# Patient Record
Sex: MALE | URBAN - METROPOLITAN AREA
[De-identification: names, ages, dates, MRNs, and addresses within clinical notes are randomized per-mention and may not be internally consistent; named-entity substitution may affect disease eponyms.]

---

## 2024-11-25 ENCOUNTER — SOCIAL WORK (OUTPATIENT)
Dept: CASE MANAGEMENT | Facility: HOSPITAL | Age: 44
End: 2024-11-25
Payer: COMMERCIAL

## 2024-11-25 ENCOUNTER — OFFICE VISIT (OUTPATIENT)
Dept: OTOLARYNGOLOGY | Facility: HOSPITAL | Age: 44
End: 2024-11-25
Payer: COMMERCIAL

## 2024-11-25 VITALS — HEIGHT: 66 IN | WEIGHT: 127.7 LBS | TEMPERATURE: 97.7 F | BODY MASS INDEX: 20.52 KG/M2

## 2024-11-25 DIAGNOSIS — C05.1 CANCER OF SOFT PALATE (MULTI): ICD-10-CM

## 2024-11-25 PROCEDURE — 3008F BODY MASS INDEX DOCD: CPT | Performed by: OTOLARYNGOLOGY

## 2024-11-25 PROCEDURE — 31575 DIAGNOSTIC LARYNGOSCOPY: CPT | Performed by: OTOLARYNGOLOGY

## 2024-11-25 PROCEDURE — 99205 OFFICE O/P NEW HI 60 MIN: CPT | Performed by: OTOLARYNGOLOGY

## 2024-11-25 PROCEDURE — 99215 OFFICE O/P EST HI 40 MIN: CPT | Performed by: OTOLARYNGOLOGY

## 2024-11-25 RX ORDER — OXYCODONE AND ACETAMINOPHEN 7.5; 325 MG/1; MG/1
1 TABLET ORAL EVERY 6 HOURS PRN
COMMUNITY

## 2024-11-25 RX ORDER — MULTIVIT-MIN/IRON FUM/FOLIC AC 7.5 MG-4
1 TABLET ORAL DAILY
COMMUNITY

## 2024-11-25 RX ORDER — FERROUS GLUCONATE 324(37.5)
1 TABLET ORAL
COMMUNITY
Start: 2024-11-08

## 2024-11-25 ASSESSMENT — PATIENT HEALTH QUESTIONNAIRE - PHQ9
1. LITTLE INTEREST OR PLEASURE IN DOING THINGS: NOT AT ALL
SUM OF ALL RESPONSES TO PHQ9 QUESTIONS 1 & 2: 0
2. FEELING DOWN, DEPRESSED OR HOPELESS: NOT AT ALL

## 2024-11-25 ASSESSMENT — PAIN SCALES - GENERAL: PAINLEVEL_OUTOF10: 8

## 2024-11-25 NOTE — Clinical Note
November 26, 2024     No Recipients    Patient: Sim Guaman   YOB: 1980   Date of Visit: 11/25/2024       Dear Dr. Ny Recipients:    Thank you for referring Sim Guaman to me for evaluation. Below are my notes for this consultation.  If you have questions, please do not hesitate to call me. I look forward to following your patient along with you.       Sincerely,     Susie Ferguson MD      CC: No Recipients  ______________________________________________________________________________________      Otolaryngology - Head and Neck Cancer Outpatient New Patient Note      Chief Concern:  Soft palate mass    History Of Present Illness  Sim Guaman is a 44 y.o. male with a history of cigarette smoking, presenting today for evaluation of a soft palatal mass.  The patient reports that in August he was experiencing a sore throat, and so went to see his primary care physician.  At that time he was noted to have a white lesion of his right tonsillar area/soft palate, for that he was given antibiotics.  He was then admitted to the hospital in September for the same issue, at the time he reports the physicians believed it was mononucleosis and was treated with supportive therapy and fungals in case it was a topical thrush.  Not biopsied then.    Patient was then seen by a the ENT physician in October, and at the time the patient denied any neck masses.  A biopsy was obtained of the right soft palate, and that resulted back as squamous cell carcinoma, p16 negative.  He had received CT scans of the neck during his hospitalization in September, and these did not show any masses in the neck either.  Throughout this entire time he is experienced a headache, and odynophagia.  However the patient denies any dysphagia, bleeding, voice changes, ear pain, weight changes.  For the past 2 weeks the patient has noted significant changes to his neck, with multiple nodules in the left neck.  Not  "painful and do not drain, however they have increased significantly in size and he is concerned about them.  They are also painful, and he endorses cervical neck pain as well, for this he is taking oxycodone and Tylenol as prescribed to him.    The patient denies any other past medical history, reports he does not go to the doctor very often.  He does endorse a 0.5 pack/day smoking history for over 20 years, he does report that he quit last month.  He also endorses drinking on the weekend, does not quantify how much he is drinking is when prompted.  Denies taking any medications besides oxycodone and lidocaine.  Patient and his spouse live quite far away, close to [facility].  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  There is no problem list on file for this patient.      Surgical History  He has no past surgical history on file.     Social History  He reports that he quit smoking about 2 months ago. His smoking use included cigarettes. He has never used smokeless tobacco. No history on file for alcohol use and drug use.    Family History  No family history on file.     Allergies  Patient has no known allergies.    Review of Systems  A 12-point review of systems was performed and noted be negative except for that which was mentioned in the history of present illness     Last Recorded Vitals  Temperature 36.5 °C (97.7 °F), temperature source Tympanic, height 1.676 m (5' 6\"), weight 57.9 kg (127 lb 11.2 oz).     Physical Exam:  Constitutional:  No acute " distress  Voice:  No hoarseness or other abnormality  Respiration:  Breathing comfortably, no stridor  Cardiovascular:  No clubbing/cyanosis/edema in hands  Eyes:  EOM intact, sclera normal  Neuro:  Alert and oriented times 3, Cranial nerves II-XII grossly intact and symmetric bilaterally  Head and Face:  Symmetric facial features, no masses or lesions, sinuses non-tender to palpation  Salivary Glands:  Parotid and submandibular glands normal bilaterally  Right Ear:  Normal external ear, external auditory canal, and TM to otoscopy, normal hearing to whispered voice.  Left Ear: Normal external ear, external auditory canal, and TM to otoscopy, normal hearing to whispered voice.  Nose:  External nose midline, anterior rhinoscopy is normal with limited visualization to the anterior aspect of the interior turbinates, no bleeding or drainage, no lesions  Oral Cavity/Oropharynx/Lips:  Normal mucous membranes, normal floor of mouth/tongue/.  White, fungating mass noted of the right soft palate/border of oropharynx extending superiorly, to the left, and anteriorly with displacement of the uvula to the left.  The mass is approximately 4 cm in width, it is nonfriable.  Neck/Lymph: Neck level 3 lymphadenopathy, 1 nodule approximately 3 cm in diameter, another nodule approximately 4 cm in diameter.  Right neck levels 2 lymphadenopathy, approximately 2 cm in diameter.  Skin:  Neck skin is without scar or injury  Psych:  Alert and oriented with appropriate mood and affect      Medications:  Medication Documentation Review Audit       Reviewed by Korin Arthur MA (Medical Assistant) on 11/25/24 at 0919      Medication Order Taking? Sig Documenting Provider Last Dose Status   ferrous gluconate 324 (37.5 Fe) MG tablet [id number] Yes Take 1 tablet (324 mg) by mouth early in the morning.. Historical Provider, MD  Active   multivitamin with minerals tablet [id number]  Take 1 tablet by mouth once daily. Historical Provider, MD  Active    oxyCODONE-acetaminophen (Percocet) 7.5-325 mg tablet [id number]  Take 1 tablet by mouth every 6 hours if needed. Historical Provider, MD  Active                      Imaging:  I have personally reviewed the CT scan from his hospitalization in September, it does not show an overt mass in the soft palate, there is a potential for the beginnings of a soft palate mass measuring approximately 3 cm in length.  There is no overt lymphadenopathy noted, potentially a small right 1 m mass in the submandibular region, however this does not have concerning graphic features.  The left lymph nodes are not enlarged in the scan.    Procedure Note: Flexible Nasolaryngoscopy  Verbal informed consent was obtained from the patient/patient's guardian. 4% lidocaine mixed with phenylephrine was prepared and dripped into the nose. It was placed in the right naris. Following an appropriate amount of time to allow for adequate anesthesia, a flexible fiberoptic nasolaryngoscope was placed into the patient's right naris. The nasal cavity, nasopharynx, oropharynx, hypopharynx, and all endolaryngeal structures were visualized and were normal except as listed below. Significant findings included:  -Soft palatal mass noted as described in physical exam findings, it does extend into the right tonsillar region.  There are no masses noted inferiorly, or within the supraglottis or hypopharynx  - No mucosal abnormalities, masses, or lesions appreciated  - Bilateral vocal fold mobile, widely patent airway  - No pooling of secretions       Assessment/Plan  Sim Guaman is a 44-year-old male presenting for evaluation of his soft palatal cancer, I would tentatively stage this as a T3 N2c MX cancer.  The patient is a surgical candidate based on clinical examination, however he will need full workup to ensure that he is a candidate.  We discussed extensively the options including chemo radiation versus surgery.  We explained the role of multi  disciplinary tumor board and how treatment will be determined from that.  The patient we also explained the role of chemotherapy and radiation, and the importance of medical oncology and radiation oncology.    The patient does not have up-to-date scans of the head and neck area, he will need new ones that given the previous scans do not have lymphadenopathy present.  He also need workup for preadmission testing should surgery be the best option for him.  We will proceed with the CT scans of the neck and chest for staging, and discuss him at tumor board.  Will see him back in 2 weeks time for further discussion on how to proceed treatment of his cancer    Plan:  > CT scan of the neck and chest with contrast  > Referral to tumor board for discussion  > Clinic in 2 weeks for discussion, this can be a virtual visit    The patient was seen and examined with Dr. Phyllis Cabrera MD [facility] - PGY1  Otolaryngology - Head and Neck Surgery    ENT Consult pager: [phone]  ENT Peds pager: [phone]  ENT Head & Neck Surgery Phone: [phone]  ENT subspecialty team: Mick individual resident who wrote today's note  ENT Outpatient scheduling number: [phone]  Please Page [phone] or call [phone] if Urgent

## 2024-11-25 NOTE — PROGRESS NOTES
Otolaryngology - Head and Neck Cancer Outpatient New Patient Note      Chief Concern:  Soft palate mass    History Of Present Illness  Sim Guaman is a 44 y.o. male with a history of cigarette smoking, presenting today for evaluation of a soft palatal mass.  The patient reports that in August he was experiencing a sore throat, and so went to see his primary care physician.  At that time he was noted to have a white lesion of his right tonsillar area/soft palate, for that he was given antibiotics.  He was then admitted to the hospital in September for the same issue, at the time he reports the physicians believed it was mononucleosis and was treated with supportive therapy and fungals in case it was a topical thrush.  Not biopsied then.    Patient was then seen by a the ENT physician in October, and at the time the patient denied any neck masses.  A biopsy was obtained of the right soft palate, and that resulted back as squamous cell carcinoma, p16 negative.  He had received CT scans of the neck during his hospitalization in September, and these did not show any masses in the neck either.  Throughout this entire time he is experienced a headache, and odynophagia.  However the patient denies any dysphagia, bleeding, voice changes, ear pain, weight changes.  For the past 2 weeks the patient has noted significant changes to his neck, with multiple nodules in the left neck.  Not painful and do not drain, however they have increased significantly in size and he is concerned about them.  They are also painful, and he endorses cervical neck pain as well, for this he is taking oxycodone and Tylenol as prescribed to him.    The patient denies any other past medical history, reports he does not go to the doctor very often.  He does endorse a 0.5 pack/day smoking history for over 20 years, he does report that he quit last month.  He also endorses drinking on the weekend, does not quantify how much he is drinking  "is when prompted.  Denies taking any medications besides oxycodone and lidocaine.  Patient and his spouse live quite far away, close to [facility].  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  There is no problem list on file for this patient.      Surgical History  He has no past surgical history on file.     Social History  He reports that he quit smoking about 2 months ago. His smoking use included cigarettes. He has never used smokeless tobacco. No history on file for alcohol use and drug use.    Family History  No family history on file.     Allergies  Patient has no known allergies.    Review of Systems  A 12-point review of systems was performed and noted be negative except for that which was mentioned in the history of present illness     Last Recorded Vitals  Temperature 36.5 °C (97.7 °F), temperature source Tympanic, height 1.676 m (5' 6\"), weight 57.9 kg (127 lb 11.2 oz).     Physical Exam:  Constitutional:  No acute distress  Voice:  No hoarseness or other abnormality  Respiration:  Breathing comfortably, no stridor  Cardiovascular:  No clubbing/cyanosis/edema in hands  Eyes:  EOM intact, sclera normal  Neuro:  Alert and oriented times 3, Cranial nerves II-XII grossly intact and symmetric bilaterally  Head and Face:  Symmetric facial features, no masses or lesions, sinuses non-tender to palpation  Salivary Glands:  Parotid and submandibular glands normal bilaterally  Right Ear:  Normal external ear, external auditory canal, and TM to " otoscopy, normal hearing to whispered voice.  Left Ear: Normal external ear, external auditory canal, and TM to otoscopy, normal hearing to whispered voice.  Nose:  External nose midline, anterior rhinoscopy is normal with limited visualization to the anterior aspect of the interior turbinates, no bleeding or drainage, no lesions  Oral Cavity/Oropharynx/Lips:  Normal mucous membranes, normal floor of mouth/tongue/.  White, fungating mass noted of the right soft palate/border of oropharynx extending superiorly, to the left, and anteriorly with displacement of the uvula to the left.  The mass is approximately 4 cm in width, it is nonfriable.  Neck/Lymph: Neck level 3 lymphadenopathy, 1 nodule approximately 3 cm in diameter, another nodule approximately 4 cm in diameter.  Right neck levels 2 lymphadenopathy, approximately 2 cm in diameter.  Skin:  Neck skin is without scar or injury  Psych:  Alert and oriented with appropriate mood and affect      Medications:  Medication Documentation Review Audit       Reviewed by Korin Arthur MA (Medical Assistant) on 11/25/24 at 0919      Medication Order Taking? Sig Documenting Provider Last Dose Status   ferrous gluconate 324 (37.5 Fe) MG tablet [id number] Yes Take 1 tablet (324 mg) by mouth early in the morning.. Historical Provider, MD  Active   multivitamin with minerals tablet [id number]  Take 1 tablet by mouth once daily. Historical Provider, MD  Active   oxyCODONE-acetaminophen (Percocet) 7.5-325 mg tablet [id number]  Take 1 tablet by mouth every 6 hours if needed. Historical Provider, MD  Active                      Imaging:  I have personally reviewed the CT scan from his hospitalization in September, it does not show an overt mass in the soft palate, there is a potential for the beginnings of a soft palate mass measuring approximately 3 cm in length.  There is no overt lymphadenopathy noted, potentially a small right 1 m mass in the submandibular region, however this  does not have concerning graphic features.  The left lymph nodes are not enlarged in the scan.    Procedure Note: Flexible Nasolaryngoscopy  Verbal informed consent was obtained from the patient/patient's guardian. 4% lidocaine mixed with phenylephrine was prepared and dripped into the nose. It was placed in the right naris. Following an appropriate amount of time to allow for adequate anesthesia, a flexible fiberoptic nasolaryngoscope was placed into the patient's right naris. The nasal cavity, nasopharynx, oropharynx, hypopharynx, and all endolaryngeal structures were visualized and were normal except as listed below. Significant findings included:  -Soft palatal mass noted as described in physical exam findings, it does extend into the right tonsillar region.  There are no masses noted inferiorly, or within the supraglottis or hypopharynx  - No mucosal abnormalities, masses, or lesions appreciated  - Bilateral vocal fold mobile, widely patent airway  - No pooling of secretions       Assessment/Plan   Smi Guaman is a 44-year-old male presenting for evaluation of his soft palatal cancer, I would tentatively stage this as a T3 N2c MX cancer.  The patient is a surgical candidate based on clinical examination, however he will need full workup to ensure that he is a candidate.  We discussed extensively the options including chemo radiation versus surgery.  We explained the role of multi disciplinary tumor board and how treatment will be determined from that.  The patient we also explained the role of chemotherapy and radiation, and the importance of medical oncology and radiation oncology.    The patient does not have up-to-date scans of the head and neck area, he will need new ones that given the previous scans do not have lymphadenopathy present.  He also need workup for preadmission testing should surgery be the best option for him.  We will proceed with the CT scans of the neck and chest for staging, and  discuss him at tumor board.  Will see him back in 2 weeks time for further discussion on how to proceed treatment of his cancer    Plan:  > CT scan of the neck and chest with contrast  > Referral to tumor board for discussion  > Clinic in 2 weeks for discussion, this can be a virtual visit    The patient was seen and examined with Dr. Phyllis Cabrera MD [facility]I - PGY1  Otolaryngology - Head and Neck Surgery    ENT Consult pager: [phone]  ENT Peds pager: [phone]  ENT Head & Neck Surgery Phone: [phone]  ENT subspecialty team: Mick individual resident who wrote today's note  ENT Outpatient scheduling number: [phone]  Please Page [phone] or call [phone] if Urgent

## 2024-11-25 NOTE — PROGRESS NOTES
SHASHI made joint visit with MIGUELINA Elena meeting Nima and his wife, Casandra. Nima was visibly upset and unsure of decision to move forward with surgery or not. He expressed concern and fear over having surgery, caring for a feeding tube, and complications that can happen post-op. SW provided support with active listening and validation of feelings/fears. SW encouraged Nima and Casandra to take time to process diagnosis and what comes with it. Additionally medical questions were asked, Dr. Ferguson re-entered room to answer questions about surgical intervention and treatment options. Nima shared that he would like to spend [place] as normal as possible. Medical team attempted to reassure Nima, but he appeared to still be hesitant. Again, encouraged him and Casandra to talk more as a family and take time to process information and make an informed decision that works best for Nima and his family. Provided business card and encouraged them to reach out at anytime. Did not complete psychosocial at this time due to Nima and Casandra feeling overwhelmed. SW to contact [facility] to see if transportation benefit is available due to distance. Shared that SW will check back in after Thanksgiving, Nima and Casandra agreeable. Will continue to follow.

## 2024-11-26 ENCOUNTER — TELEPHONE (OUTPATIENT)
Dept: OTOLARYNGOLOGY | Facility: HOSPITAL | Age: 44
End: 2024-11-26
Payer: COMMERCIAL

## 2024-11-26 NOTE — TELEPHONE ENCOUNTER
Mr. Guaman was seen yesterday by Dr. Ferguson.  He was very overwhelmed by what he heard and was not willing to make any decisions at that time.    We do need updating CT scans done.  He ran out of the office before I could find out from him what location close to him would be most convenient for him.  Since it would be a non- facility, I will have to get the locations information so that I can do precert before the imaging can be scheduled.    I called and Corinne, Sim's wife, answered.  She informed me they would go to [facility] in [place], [place] ([phone]).  I informed her that I would call her back once Precert was obtained.    I called and left a message for [facility] Radiology precert department asking that they please call me back.

## 2024-11-29 ENCOUNTER — SOCIAL WORK (OUTPATIENT)
Dept: CASE MANAGEMENT | Facility: HOSPITAL | Age: 44
End: 2024-11-29
Payer: COMMERCIAL

## 2024-11-29 NOTE — TELEPHONE ENCOUNTER
I tried calling [facility] Outpatient Radiology scheduling today and was informed they are closed today.    I will call back on Monday.

## 2024-11-29 NOTE — PROGRESS NOTES
Social Work Note    Meeting Location: Phone  Person(s) Present: Nima' wife, Casandra, Kasandra KAROL   Identified Needs: Check-in  Impression and Plan: SW called Nima, his wife Casandra answered. Reintroduced self and role. Casandra shared that her and Nima did not talk about surgery any further, and shared that MIGUELINA Elena mentioned trying to network with [facility] to complete scans versus having Nmia come back to [facility] for them. Family is appreciative of arrangement and call. Inquired how Nima and Casandra have been since meeting earlier in the week, she shared that they are both very nervous and scared of the unknown. SW provided validation of feelings and empathy. Shared that SW would connect with MIGUELINA Elena and assist if needed. Encouraged Casandra to call for any questions or concerns in the meantime. Casandra verbalized understanding, will continue to follow.   Interventions Provided: Supportive Counseling  Estimated Time Spent: 5 minutes

## 2024-12-02 NOTE — TELEPHONE ENCOUNTER
I tried calling [facility] scheduling as I still have not heard back from the on my 2 other messages.  I left a message this afternoon, and I've also left a message for Dr. Sy's office asking that his nurse give me a call.  I'm hoping they can help me get Mr. Guaman scheduled for his needed CT neck and chest.

## 2024-12-04 PROBLEM — C05.1: Status: ACTIVE | Noted: 2024-12-04

## 2024-12-04 NOTE — TELEPHONE ENCOUNTER
I was finally able to connect with [facility] radiology ([phone]).  I spoke with Kassy in Scheduling.  She informed me that their first availability for scanning wasn't until the end of February, beginning of March.  I explained that this is a cancer patient and the imaging was ordered STAT.  She asked that I fax the information directly to her once PA is obtained.      She was able to provide the information I needed to do the PA.  This was completed this morning and per the patient's insurance is submitted for medical review.  I faxed the needed supporting documentation over to the insurance company.    [facility] Tax ID [id number]  [facility] NPI: [id number]  Fax Number to Kassy at [facility] [phone]  CPT codes:  56076 (CT neck with), 61398 (CT chest with)    Insurance information:  Phone:  [phone]  Tracking number for radiology precert request: [id number]

## 2024-12-05 NOTE — TUMOR BOARD NOTE
[facility] HEAD AND NECK TUMOR BOARD NOTE:    Sim Guaman Is a 44 y.o. male who was presented by Dr. Ferguson at [facility] Head & Neck Tumor Board on 12/6/24 which included representatives from all Head & Neck disciplines (Medical oncology/Radiation oncology/Otolaryngology/Radiology/Pathology).     History and Physical in Brief:  44 y.o. male who has a new soft palate mass.  He has had a sore throat since August and found that there was a spot on his right tonsil/soft palate by his primary care physician.  This was refractory to antibiotics.  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  He has a 10-pack-year smoking history, quit in October 2024.  Social drinker on weekends, did not quantify volume.  Scope examination with no other aerodigestive lesions.  Clinical examination with 4 cm lesion of the right soft palate, right LAD in levels 2 and 3.    Imaging:  CT Neck with Contrast (9/26/24):   No radiology report reviewed, interpretation from pathologist during tumor board on 12/6/2024:  Difficult to appreciate right soft palate masses, acetabuli for symmetry of the right palate appreciated.  There are multiple lymph nodes at level 2 of the neck though they are not abnormal by size criteria, they have made the suspicious appearance and likely represent metastasis    Chest CT with Contrast:   N/A    PET/CT Head and Neck:   N/A    Procedures to date:  9/2024-biopsy right palate lesion    Pertinent Pathology:  9/2024-well-differentiated squamous of carcinoma     The [facility] Head and Neck Tumor Board considered available treatment options and made the following staging and recommendations:    Staging and Recommendations:    Site: right Oropharyngeal  squamous cell carcinoma  Stage: T2/3N0Mx  Recommendation:  Complete staging with CT chest and repeat CT neck given time interval from initial examination. Will need  to re-evaluate for potential morbidity from defect to determine surgical excision versus chemoradiation.    Clinical Trial Status:   N/A      National site-specific guidelines were discussed with respect to the case.

## 2024-12-06 ENCOUNTER — SOCIAL WORK (OUTPATIENT)
Dept: CASE MANAGEMENT | Facility: HOSPITAL | Age: 44
End: 2024-12-06

## 2024-12-06 ENCOUNTER — APPOINTMENT (OUTPATIENT)
Dept: HEMATOLOGY/ONCOLOGY | Facility: HOSPITAL | Age: 44
End: 2024-12-06
Payer: COMMERCIAL

## 2024-12-06 NOTE — PROGRESS NOTES
"Social Work Note    Meeting Location: Phone  Person(s) Present: Casandra (wife), Kasandra LSW  Identified Needs: STAT CT scan  Impression and Plan: SHASHI received call from Nima's wife, Casandra. She inquired on the status of Nima's CT scans. SHASHI consulted with MIGUELINA Elena who explained \"Insurance clearance still pending. They are also telling me they can't do his scans until end of February/early March. However someone was going to see what they can do. But until PA is completed we are on hold. They may need to have a backup location as we cannot wait that long for scans.\" SW made call to [facility] to find status of the radiology pre-cert. Representative shared that if a MA, RN, or MD can call [phone] they can do a peer to peer quickly to escalate the review given the circumstances. SHASHI provided this information to MIGUELINA Elena as is it out of scope for SHASHI. Reference to conversation is #[id number]. SHASHI made call to Casandra, shared information. She was appreciative of call, but shared that Nima feels that his cancer is spreading. She shared that he has made comments that it is getting harder to swallow and he feels that his throat is closing up. SW shared it is not within scope of practice to recommend medical advice however would encourage Nima and Casandra to consider going to the ED if its continue to progress. Casandra verbalized understanding. SHASHI updated MIGUELINA Elena of conversation.   Interventions Provided: Advocacy, Care Coordination, and Supportive Counseling  Estimated Time Spent: 35 minutes    SW will continue to follow.  "

## 2024-12-09 ENCOUNTER — SOCIAL WORK (OUTPATIENT)
Dept: CASE MANAGEMENT | Facility: HOSPITAL | Age: 44
End: 2024-12-09
Payer: COMMERCIAL

## 2024-12-09 NOTE — PROGRESS NOTES
Social Work Note    Meeting Location: Phone  Person(s) Present: Casandra (Nima' wife), Kasandra ROBERSON  Identified Needs: Transportation  Impression and Plan: SW received Epic chat from MIGUELINA Elena that shared Nima was having transportation issues and was unable to make it to [facility] for his scans. SW called and connected with Casandra, who shared that she had just gotten off the phone with scheduling at radiology with [place] and Nima is getting his scans on 12/13 at 10:15 am. SW inquired how Nima and Casandra are handling situation, she shared Nima is scared and very anxious. SW shared sympathy and validation feelings. Will continue to follow.   Interventions Provided: Advocacy and Supportive Counseling  Estimated Time Spent: 10 minutes      pain

## 2024-12-12 ENCOUNTER — SOCIAL WORK (OUTPATIENT)
Dept: CASE MANAGEMENT | Facility: HOSPITAL | Age: 44
End: 2024-12-12
Payer: COMMERCIAL

## 2024-12-12 NOTE — PROGRESS NOTES
Transportation Referral     Referral Source: pt wife, Casandra   Multiple Rides Needed? No  First Date Transportation is needed? 12/13/2024  Ambulation: Independently  Pick-up Address: [address] [place] [id number]   Patient Phone: [phone]   Accompaniment: Yes, Casandra Ambulation?: Independently  Phone Receives Text Messages? Yes  Transportation Service: [facility] (p: [phone])      Scheduled Ride(s):     Date: 12/13/2024   Appointment: Scans   Drop off [facility] [place] [address] ([facility])   Time: 8:45am-9:15am   Return Ride: Will-call   Confirmation: [id number]      Pt agreeable to plan. SW will continue to follow as needed.

## 2024-12-20 ENCOUNTER — TELEPHONE (OUTPATIENT)
Dept: OTOLARYNGOLOGY | Facility: HOSPITAL | Age: 44
End: 2024-12-20
Payer: COMMERCIAL

## 2024-12-20 DIAGNOSIS — C05.1 CANCER OF SOFT PALATE (MULTI): ICD-10-CM

## 2024-12-20 NOTE — TELEPHONE ENCOUNTER
Dr. Ferguson message me that he spoke with Mr. Guaman yesterday and he's agreeable for surgery.    Triple endoscopy, trach, peg, mandibulotomy, soft palatectomy, pharyngectomy, bilateral neck dissections, radial forearm free flap reconstruction.    I tried to reach Nima and got no answer or answering device.    I will try calling him again on Monday.

## 2024-12-23 NOTE — TELEPHONE ENCOUNTER
I tried calling  And Mrs. Guaman again.  I was leaving a message when Mrs. Guaman picked up.    I informed her that I was aware Dr. Ferguson spoke with them and that I was told Nima was agreeable to undergo the surgery.    She confirmed this.  I informed her that surgery would be on 1/20/25, and that they would need to arrive at 5:45 a.m. to the hospital that day.  I also informed her that Nima would need to go through preadmission testing here in [place], along with meeting Dr. Ferguson's partner, Dr. Pimentel, who would also be involved in the surgery.    She mention that she was hoping the insurance company would be able to provide transportation due to them having car issues.  I informed her that I would work with Kasandra, our  to see what can be done.    She does understand that the pretesting apts are a must in order for surgery to be done.    With them in agreement for the surgery, I will start working on the needed apts and will follow up with them in the coming days.    DOS:  1/20/2025

## 2024-12-24 ENCOUNTER — TELEPHONE (OUTPATIENT)
Facility: CLINIC | Age: 44
End: 2024-12-24
Payer: COMMERCIAL

## 2024-12-24 ENCOUNTER — SOCIAL WORK (OUTPATIENT)
Dept: CASE MANAGEMENT | Facility: HOSPITAL | Age: 44
End: 2024-12-24
Payer: COMMERCIAL

## 2024-12-24 ENCOUNTER — HOSPITAL ENCOUNTER (OUTPATIENT)
Facility: HOSPITAL | Age: 44
Setting detail: SURGERY ADMIT
End: 2024-12-24
Attending: OTOLARYNGOLOGY | Admitting: OTOLARYNGOLOGY
Payer: COMMERCIAL

## 2024-12-24 PROBLEM — C05.1: Status: ACTIVE | Noted: 2024-12-20

## 2024-12-24 NOTE — PROGRESS NOTES
"Social Work Note    Meeting Location: Phone  Person(s) Present: Corinne Herring (wife), Kasandra LSW   Identified Needs: Medical Questions  Impression and Plan: SW received call from Nima' wife, Corinne who shared that Nima had questions. She placed Nima on the phone, SW asked how he was doing, he shared that he is doing horrible. When asked to expand on that, he said that he didn't want to get into it, but that he had questions about the scans and insurance. He asked if the scans were delayed because of insurance. SW shared unsure of details as SW did not work with insurance on the peer to peer, only called to gain status of review. Explained that RN Kassy may be able to offer insight. He shared that he will call her next. Before ending call, SHASHI explained working on resources for transportation to come up to [facility] for PAT and surgery. Niam responded with \"it may be too late for that, I don't know\". SW offered support and encouraged him to take things one at a time. Shared that SW will call once all resources are gathered so that discussion of what option can be decided upon. Will continue to follow.   Interventions Provided: Assessment, Care Coordination, and Supportive Counseling      "

## 2024-12-24 NOTE — TELEPHONE ENCOUNTER
"Nima called me for reassurance to see if there was anything that could have been done sooner to get his scans done.  He admits to being very anxious about his situation.    I explained that based on his request to do the scans locally, the need for me to get in touch with [facility] to get the needed information, and get PA done, the scans were done as soon as they could be.    I stated his insurance has a 2 week PA period from the time of apt scheduling.  I had to get the information from [place] and submit the request myself, which took several days before [place] even called me back so I could start it.  In addition [place] informed me their soonest was end of February, early March 2025 for the scan, which I explained was not acceptable due to his cancer situation, and he declined the first date they offered, he got done in a time frame as best as we could do.    I stated the only thing that might have helped moved things along quicker would have been for him to make his decision as to what he wanted to do while he was in the office with us, and to be willing to come to [place] for the testing.  Unfortunately, next surgery date is 1/20/25.    He also wanted to make sure that our team knows that he needs \"a lot of pain medication.\"  \"I've been on it a while and I don't want to suffer.\"  I informed him that we will involve our pain management/palliative care team as part of his postop care team.    Nima is worried that if he talks or moves his head the cancer will spread.  I informed him no.  I encouraged him to spend time with his family like he wanted and enjoy the holidays.    He will call me back with any further questions/concerns.  "

## 2024-12-26 ENCOUNTER — SOCIAL WORK (OUTPATIENT)
Dept: CASE MANAGEMENT | Facility: HOSPITAL | Age: 44
End: 2024-12-26
Payer: COMMERCIAL

## 2024-12-26 NOTE — PROGRESS NOTES
Social Work Note    Meeting Location: Phone  Person(s) Present: Nima' wife Kasandra Guajardo LSW  Identified Needs: Transportation  Impression and Plan: SW made call this morning to Nima, wife Casandra answered the phone. SW explained continuous effort to gather transportation resources to present to Nima and Casandra to determine best option. Casandra shared PAT testing will be on 1/10 and surgery is for 1/20. She explained that Nima prefers to come up on 1/10 and go back home on 1/10. She also shared that due to her children, she would have to come up and leave the same day of Nima' surgery but is unsure of how she would get back home. Her plan is to come back up once he is recovered and ready for discharge. SW offered empathy and understanding of extreme coordination this will take as Nisa have school-aged children. SW explained need to consult with supervisor to gain clarity and seek additional resources.     SW collaborated with supervisor, determination came to transport Nima half-way to [facility] if insurance, [facility] will not accommodate. SW made call to [facility], spoke to different representative that shared they will accommodate transportation however, much notice is needed because they must confirm details of visit with medical team. SHASHI received staff message from PAT representative Diana Bliss that provided times available, SW messaged and requested 10:30 slot, successfully scheduled. Transportation is pending ENT appointment for same day as the transportation  time may vary pending when Dr. Pimentel can fit Nima into the schedule. Will collaborate with MIGUELINA Elena to finalize details.     Interventions Provided: Advocacy, Assessment, and Care Coordination  Estimated Time Spent: 120+ minutes

## 2025-01-06 ENCOUNTER — SOCIAL WORK (OUTPATIENT)
Dept: CASE MANAGEMENT | Facility: HOSPITAL | Age: 45
End: 2025-01-06
Payer: COMMERCIAL

## 2025-01-06 NOTE — PROGRESS NOTES
"Transportation    Ambulation: Independently  Pick-up Address: [address] [place] [address]  Patient Phone: [phone]  Accompaniment: Yes,   Ambulation?: Independently  Phone Receives Text Messages? Yes  Transportation Service: [employer] (p\" [phone])      Scheduled Ride(s):     Date:  1/10/2025  Appointment:  PAT & ENT   Drop off Address: [facility] SCC: [address] [place] [address]   Time:  7:35-8:05  Return Ride:  Will call  Confirmation: [id number]    SHASHI called Nima and his wife Casandra to inform them of details. Casandra answered, shared detailed information of upcoming trip, she acknowledged. SW shared plan to meet in-person to go over details of 1/20 surgery, Casandra is agreeable to plan. She explained that Nima is open to staying at [facility] day before surgery, but had concerns about bed bugs. SHASHI shared that [facility] has a strict adherence to cleanliness but will call [facility] and inquire if it has been an issue so that Nima can make informed decision if he would like to stay or not. Will follow up at 1/10 appointment. Will continue to follow.   "

## 2025-01-09 NOTE — PROGRESS NOTES
History of Present Illness    Sim Guaman is a 44 y.o. male who is a patient of one of my partners.  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  He had some scanning done that I personally reviewed that shows the lesion in the pharynx as well as the bilateral neck disease.  He is scheduled to have this addressed surgically in the near future.  His main concern has to do with pain.  He really does not want to suffer.    Physical Exam    Examination of the oral cavity and oropharynx shows this large lesion involving the entire soft palate and extending on the lateral pharyngeal wall on the right.  He has good mobility of the tongue.  He has good mandibular excursion.  Palpation of the parotid, neck, thyroid feel shows the palpable level 2 adenopathies bilaterally.    Assessment and Plan    Large oropharyngeal cancer which is p16 negative.  I went over the extent of surgery with the patient and his spouse.  I answered all of her questions.  He will definitely need to be seen by pain management when we have him in the hospital.

## 2025-01-10 ENCOUNTER — SOCIAL WORK (OUTPATIENT)
Dept: CASE MANAGEMENT | Facility: HOSPITAL | Age: 45
End: 2025-01-10

## 2025-01-10 ENCOUNTER — PRE-ADMISSION TESTING (OUTPATIENT)
Dept: PREADMISSION TESTING | Facility: HOSPITAL | Age: 45
End: 2025-01-10
Payer: COMMERCIAL

## 2025-01-10 ENCOUNTER — OFFICE VISIT (OUTPATIENT)
Dept: OTOLARYNGOLOGY | Facility: HOSPITAL | Age: 45
End: 2025-01-10
Payer: COMMERCIAL

## 2025-01-10 VITALS
BODY MASS INDEX: 19.78 KG/M2 | SYSTOLIC BLOOD PRESSURE: 119 MMHG | WEIGHT: 126 LBS | RESPIRATION RATE: 16 BRPM | HEART RATE: 76 BPM | HEIGHT: 67 IN | DIASTOLIC BLOOD PRESSURE: 80 MMHG | TEMPERATURE: 99.3 F

## 2025-01-10 DIAGNOSIS — C10.9 MALIGNANT NEOPLASM OF OROPHARYNX: Primary | ICD-10-CM

## 2025-01-10 DIAGNOSIS — C05.1 CANCER OF SOFT PALATE (MULTI): ICD-10-CM

## 2025-01-10 LAB
ABO GROUP (TYPE) IN BLOOD: NORMAL
ALBUMIN SERPL BCP-MCNC: 3.7 G/DL (ref 3.4–5)
ALP SERPL-CCNC: 72 U/L (ref 33–120)
ALT SERPL W P-5'-P-CCNC: 14 U/L (ref 10–52)
ANION GAP SERPL CALC-SCNC: 14 MMOL/L (ref 10–20)
ANTIBODY SCREEN: NORMAL
AST SERPL W P-5'-P-CCNC: 28 U/L (ref 9–39)
BILIRUB SERPL-MCNC: 0.2 MG/DL (ref 0–1.2)
BUN SERPL-MCNC: 6 MG/DL (ref 6–23)
CALCIUM SERPL-MCNC: 8.9 MG/DL (ref 8.6–10.6)
CHLORIDE SERPL-SCNC: 100 MMOL/L (ref 98–107)
CO2 SERPL-SCNC: 28 MMOL/L (ref 21–32)
CREAT SERPL-MCNC: 0.78 MG/DL (ref 0.5–1.3)
EGFRCR SERPLBLD CKD-EPI 2021: >90 ML/MIN/1.73M*2
ERYTHROCYTE [DISTWIDTH] IN BLOOD BY AUTOMATED COUNT: 22.7 % (ref 11.5–14.5)
GLUCOSE SERPL-MCNC: 83 MG/DL (ref 74–99)
HCT VFR BLD AUTO: 35.4 % (ref 41–52)
HGB BLD-MCNC: 11.4 G/DL (ref 13.5–17.5)
MCH RBC QN AUTO: 27.7 PG (ref 26–34)
MCHC RBC AUTO-ENTMCNC: 32.2 G/DL (ref 32–36)
MCV RBC AUTO: 86 FL (ref 80–100)
NRBC BLD-RTO: 0 /100 WBCS (ref 0–0)
PLATELET # BLD AUTO: 439 X10*3/UL (ref 150–450)
POTASSIUM SERPL-SCNC: 4.4 MMOL/L (ref 3.5–5.3)
PREALB SERPL-MCNC: 13.7 MG/DL (ref 18–40)
PROT SERPL-MCNC: 7.2 G/DL (ref 6.4–8.2)
RBC # BLD AUTO: 4.12 X10*6/UL (ref 4.5–5.9)
RH FACTOR (ANTIGEN D): NORMAL
SODIUM SERPL-SCNC: 138 MMOL/L (ref 136–145)
TSH SERPL-ACNC: 2.12 MIU/L (ref 0.44–3.98)
WBC # BLD AUTO: 9.5 X10*3/UL (ref 4.4–11.3)

## 2025-01-10 PROCEDURE — 36415 COLL VENOUS BLD VENIPUNCTURE: CPT

## 2025-01-10 PROCEDURE — 84134 ASSAY OF PREALBUMIN: CPT

## 2025-01-10 PROCEDURE — 86901 BLOOD TYPING SEROLOGIC RH(D): CPT

## 2025-01-10 PROCEDURE — 99244 OFF/OP CNSLTJ NEW/EST MOD 40: CPT | Performed by: NURSE PRACTITIONER

## 2025-01-10 PROCEDURE — 84443 ASSAY THYROID STIM HORMONE: CPT

## 2025-01-10 PROCEDURE — 85027 COMPLETE CBC AUTOMATED: CPT

## 2025-01-10 PROCEDURE — 1036F TOBACCO NON-USER: CPT | Performed by: OTOLARYNGOLOGY

## 2025-01-10 PROCEDURE — 84075 ASSAY ALKALINE PHOSPHATASE: CPT

## 2025-01-10 PROCEDURE — 99214 OFFICE O/P EST MOD 30 MIN: CPT | Performed by: OTOLARYNGOLOGY

## 2025-01-10 RX ORDER — HYDROMORPHONE HYDROCHLORIDE 2 MG/1
TABLET ORAL
COMMUNITY
Start: 2025-01-02

## 2025-01-10 RX ORDER — CHLORHEXIDINE GLUCONATE ORAL RINSE 1.2 MG/ML
15 SOLUTION DENTAL AS NEEDED
Qty: 473 ML | Refills: 0 | Status: SHIPPED | OUTPATIENT
Start: 2025-01-10 | End: 2025-01-12

## 2025-01-10 RX ORDER — CHLORHEXIDINE GLUCONATE 40 MG/ML
SOLUTION TOPICAL DAILY PRN
Qty: 473 ML | Refills: 0 | Status: SHIPPED | OUTPATIENT
Start: 2025-01-10 | End: 2025-01-15

## 2025-01-10 RX ORDER — LIDOCAINE HYDROCHLORIDE 20 MG/ML
SOLUTION OROPHARYNGEAL
COMMUNITY
Start: 2025-01-09

## 2025-01-10 RX ORDER — CLONAZEPAM 1 MG/1
1 TABLET ORAL 2 TIMES DAILY
COMMUNITY

## 2025-01-10 RX ORDER — OXYCODONE HYDROCHLORIDE 15 MG/1
TABLET ORAL
COMMUNITY
Start: 2025-01-06

## 2025-01-10 RX ORDER — BUSPIRONE HYDROCHLORIDE 5 MG/1
TABLET ORAL
COMMUNITY
Start: 2024-12-23

## 2025-01-10 ASSESSMENT — DUKE ACTIVITY SCORE INDEX (DASI)
CAN YOU TAKE CARE OF YOURSELF (EAT, DRESS, BATHE, OR USE TOILET): YES
CAN YOU CLIMB A FLIGHT OF STAIRS OR WALK UP A HILL: YES
CAN YOU DO LIGHT WORK AROUND THE HOUSE LIKE DUSTING OR WASHING DISHES: YES
DASI METS SCORE: 8.4
CAN YOU DO MODERATE WORK AROUND THE HOUSE LIKE VACUUMING, SWEEPING FLOORS OR CARRYING GROCERIES: YES
CAN YOU PARTICIPATE IN STRENOUS SPORTS LIKE SWIMMING, SINGLES TENNIS, FOOTBALL, BASKETBALL, OR SKIING: YES
CAN YOU WALK A BLOCK OR TWO ON LEVEL GROUND: YES
CAN YOU PARTICIPATE IN MODERATE RECREATIONAL ACTIVITIES LIKE GOLF, BOWLING, DANCING, DOUBLES TENNIS OR THROWING A BASEBALL OR FOOTBALL: YES
TOTAL_SCORE: 45.7
CAN YOU DO YARD WORK LIKE RAKING LEAVES, WEEDING OR PUSHING A MOWER: NO
CAN YOU DO HEAVY WORK AROUND THE HOUSE LIKE SCRUBBING FLOORS OR LIFTING AND MOVING HEAVY FURNITURE: NO
CAN YOU RUN A SHORT DISTANCE: YES
CAN YOU WALK INDOORS, SUCH AS AROUND YOUR HOUSE: YES
CAN YOU HAVE SEXUAL RELATIONS: YES

## 2025-01-10 ASSESSMENT — ENCOUNTER SYMPTOMS
RESPIRATORY NEGATIVE: 1
GASTROINTESTINAL NEGATIVE: 1
NECK PAIN: 1
EYES NEGATIVE: 1
SINUS CONGESTION: 1
NECK STIFFNESS: 1
TROUBLE SWALLOWING: 1
ARTHRALGIAS: 1
CARDIOVASCULAR NEGATIVE: 1
ENDOCRINE NEGATIVE: 1
NECK SWELLING: 1
CONSTITUTIONAL NEGATIVE: 1

## 2025-01-10 ASSESSMENT — PATIENT HEALTH QUESTIONNAIRE - PHQ9
2. FEELING DOWN, DEPRESSED OR HOPELESS: NOT AT ALL
1. LITTLE INTEREST OR PLEASURE IN DOING THINGS: NOT AT ALL
SUM OF ALL RESPONSES TO PHQ9 QUESTIONS 1 AND 2: 0

## 2025-01-10 ASSESSMENT — LIFESTYLE VARIABLES: SMOKING_STATUS: NONSMOKER

## 2025-01-10 NOTE — PROGRESS NOTES
"Social Work Note    Meeting Location: In-Person  Person(s) Present: Casandra Herring (wife), Kasandra LSW   Identified Needs: Transportation, [facility] referral, Spiritual care   Impression and Plan:   SHASHI met with Nima and his wife Casandra after PAT and ENT appointment today. Immediately upon entering the room Nima shared that he \"has ten more days to talk about transportation let's get out of here\". His wife stopped him and expressed that he needed to sit down and they needed to talk with SW on lodging and transportation now, in-person. He seated himself and appeared agitated by request. He then expressed \"I will not stay at any place with bed bugs or roaches, if I see even one I'll start walking home and die\". SHASHI shared that [facility] does not have any current or past issues with bed bugs or infestations and understands his seriousness. Shared that it is reasonable to not want to stay anywhere with such issues.     SW brought up that his insurance cannot transport Casandra back down to [place] after his surgery without him there, but can bring her up. He became angry and said that \"so now I cannot even have my wife there by my side during this surgery. How am I going to even get to the hospital. I can't even see, I will be wandering all over here not knowing where to go. This isn't going to work\" SHASHI attempted to de-escalate conversation and shared that there is a shuttle that can provide transportation to outpatient surgery area and SW is more than happy to call the shuttle for him, or the  at [facility] can as well. SHASHI encouraged Casandra to talk with family/friends and see if someone can bring her back home after surgery. Nima interrupted and said to Casandra \"you have family, I have no one. You need to call your brother and have him get you, I don't have anyone that can help, you do.\" This conversation appeared to make Casandra cry, she was tearful for remainder of the appointment.     Nima then appeared to be very " anxious and overwhelmed with thoughts of surgery going wrong. SHASHI attempted to re frame conversation and outlook but also provide validation of concerns and perspective. He explained that he is anticipating surgery to have complications and go wrong, SHASHI shared that he has a supportive and talented medical team and that should anything go wrong [facility] has the supports to intervene. He acknowledged this, but maintained that something will go wrong. SHASHI provided empathetic and active listening throughout conversation. Nima then said that he believes in [facility] but is unsure of what he thinks now. SHASHI shared that such a diagnosis can certainly test tracy and offered spiritual care for additional support to work through feelings. Nima agreed and asked that they reach out before surgery and visit during recovery. SHASHI consulted with 's Latoya and Jakob. SHASHI offered [facility] referral, both are agreeable. SHASHI to call [facility] on 1/13 to review details before sending over referral. Will continue to follow.   Interventions Provided: Assessment, Care Coordination, De-Escalation, and Supportive Counseling  Estimated Time Spent: 25 minutes

## 2025-01-10 NOTE — PREPROCEDURE INSTRUCTIONS
Fasting Guidelines    NPO Instructions:    Do not eat any food after midnight the night before your surgery/procedure.  You may have up to TEN ounces of clear liquids until TWO hours before your instructed arrival time to the hospital. This includes water, black tea/coffee, (no milk or cream), apple juice, and/or electrolyte drinks (Gatorade).  You may chew gum up to TWO hours before your surgery/procedure.    Additional Instructions:     We have sent a prescription for Hibiclens soap and Peridex mouth wash to your preferred pharmacy.  If you have not already, Please  your prescription and start using as directed before surgery.  Follow the instruction sheet provided to you at your CPM/PAT appointment.    Avoid herbal supplements, multivitamins and NSAIDS (non-steroidal anti-inflammatory drugs) such as Advil, Aleve, Ibuprofen, Naproxen, Excedrin, Meloxicam or Celebrex for at least 7 days prior to surgery. May take Tylenol as needed.    Avoid tobacco and alcohol products for 24 hours prior to surgery.    CONTACT SURGEON'S OFFICE IF YOU DEVELOP:  * Fever = 100.4 F   * New respiratory symptoms (e.g. cough, shortness of breath, respiratory distress, sore throat)  * Recent loss of taste or smell  *Flu like symptoms such as headache, fatigue or gastrointestinal symptoms  * You develop any open sores, shingles, burning or painful urination   AND/OR:  * You no longer wish to have the surgery.  * Any other personal circumstances change that may lead to the need to cancel or defer this surgery.  *You were admitted to any hospital within one week of your planned procedure.    Seven/Six Days before Surgery:  Review your medication instructions, stop indicated medications    Day of Surgery:  Review your medication instructions, take indicated medications  Wear comfortable loose fitting clothing  Do not use moisturizers, creams, lotions or perfume  All jewelry and valuables should be left at home      [facility]  [phone]           Patient Information: Pre-Operative Infection Prevention Measures     Why did I have my nose, under my arms, and groin swabbed?  The purpose of the swab is to identify Staphylococcus aureus inside your nose or on your skin.  The swab was sent to the laboratory for culture.  A positive swab/culture for Staphylococcus aureus is called colonization or carriage.      What is Staphylococcus aureus?  Staphylococcus aureus, also known as “staph”, is a germ found on the skin or in the nose of healthy people.  Sometimes Staphylococcus aureus can get into the body and cause an infection.  This can be minor (such as pimples, boils, or other skin problems).  It might also be serious (such as a blood infection, pneumonia, or a surgical site infection).    What is Staphylococcus aureus colonization or carriage?  Colonization or carriage means that a person has the germ but is not sick from it.  These bacteria can be spread on the hands or when breathing or sneezing.    How is Staphylococcus aureus spread?  It is most often spread by close contact with a person or item that carries it.    What happens if my culture is positive for Staphylococcus aureus?  Your doctor/medical team will use this information to guide any antibiotic treatment which may be necessary.  Regardless of the culture results, we will clean the inside of your nose with a betadine swab just before you have your surgery.      Will I get an infection if I have Staphylococcus aureus in my nose or on my skin?  Anyone can get an infection with Staphylococcus aureus.  However, the best way to reduce your risk of infection is to follow the instructions provided to you for the use of your CHG soap and dental rinse.        Patient Information: Oral/Dental Rinse    What is oral/dental rinse?   It is a mouthwash. It is a way of cleaning the mouth with a germ-killing solution before your surgery.  The solution contains  chlorhexidine, commonly known as CHG.   It is used inside the mouth to kill a bacteria known as Staphylococcus aureus.  Let your doctor know if you are allergic to Chlorhexidine.    Why do I need to use CHG oral/dental rinse?  The CHG oral/dental rinse helps to kill a bacteria in your mouth known as Staphylococcus aureus.     This reduces the risk of infection at the surgical site.      Using your CHG oral/dental rinse  STEPS:  Use your CHG oral/dental rinse after you brush your teeth the night before (at bedtime) and the morning of your surgery.  Follow all directions on your prescription label.    Use the cap on the container to measure 15ml   Swish (gargle if you can) the mouthwash in your mouth for at least 30 seconds, (do not swallow) and spit out  After you use your CHG rinse, do not rinse your mouth with water, drink or eat.  Please refer to the prescription label for the appropriate time to resume oral intake      What side effects might I have using the CHG oral/dental rinse?  CHG rinse will stick to plaque on the teeth.  Brush and floss just before use.  Teeth brushing will help avoid staining of plaque during use.      Patient Information: Home Preoperative Antibacterial Shower      What is a home preoperative antibacterial shower?  This shower is a way of cleaning the skin with a germ-killing solution before surgery.  The solution contains chlorhexidine, commonly known as CHG.  CHG is a skin cleanser with germ-killing ability.  Let your doctor know if you are allergic to chlorhexidine.    Why do I need to take a preoperative antibacterial shower?  Skin is not sterile.  It is best to try to make your skin as free of germs as possible before surgery.  Proper cleansing with a germ-killing soap before surgery can lower the number of germs on your skin.  This helps to reduce the risk of infection at the surgical site.  Following the instructions listed below will help you prepare your skin for surgery.       How do I use the solution?  Steps:  Begin using your CHG soap 5 days before your scheduled surgery on ________________________.    First, wash and rinse your hair using the CHG soap. Keep CHG soap away from ear canals and eyes.  Rinse completely, do not condition.  Hair extensions should be removed.  Wash your face with your normal soap and rinse.    Apply the CHG solution to a clean wet washcloth.  Turn the water off or move away from the water spray to avoid premature rinsing of the CHG soap as you are applying.   Firmly lather your entire body from the neck down.  Do not use on your face.  Pay special attention to the area(s) where your incision(s) will be located unless they are on your face.  Avoid scrubbing your skin too hard.  The important point is to have the CHG soap sit on your skin for 3 minutes.    When the 3 minutes are up, turn on the water and rinse the CHG solution off your body completely.   DO NOT wash with regular soap after you have used the CHG soap solution  Pat yourself dry with a clean, freshly-laundered towel.  DO NOT apply powders, deodorants, or lotions.  Dress in clean, freshly laundered nightclothes.    Be sure to sleep with clean, freshly laundered sheets.  Be aware that CHG will cause stains on fabrics; if you wash them with bleach after use.  Rinse your washcloth and other linens that have contact with CHG completely.  Use only non-chlorine detergents to launder the items used.   The morning of surgery is the fifth day.  Repeat the above steps and dress in clean comfortable clothing     Whom should I contact if I have any questions regarding the use of CHG soap?  Call the [facility], [facility] at [phone] if you have any questions.               Preoperative Brain Exercises    What are brain exercises?  A brain exercise is any activity that engages your thinking (cognitive) skills.    What types of activities are  considered brain exercises?  Jigsaw puzzles, crossword puzzles, word jumble, memory games, word search, and many more.  Many can be found free online or on your phone via a mobile kerry.    Why should I do brain exercises before my surgery?  More recent research has shown brain exercise before surgery can lower the risk of postoperative delirium (confusion) which can be especially important for older adults.  Patients who did brain exercises for 5 to 10 hours the days before surgery, cut their risk of postoperative delirium in half up to 1 week after surgery.         [facility]    Preoperative Deep Breathing Exercises    Why it is important to do deep breathing exercises before my surgery?  Deep breathing exercises strengthen your breathing muscles.  This helps you to recover after your surgery and decreases the chance of breathing complications.      How are the deep breathing exercises done?  Sit straight with your back supported.  Breathe in deeply and slowly through your nose. Your lower rib cage should expand and your abdomen may move forward.  Hold that breath for 3 to 5 seconds.  Breathe out through pursed lips, slowly and completely.  Rest and repeat 10 times every hour while awake.  Rest longer if you become dizzy or lightheaded.         Patient and Family Education             Ways You Can Help Prevent Blood Clots             This handout explains some simple things you can do to help prevent blood clots.      Blood clots are blockages that can form in the body's veins. When a blood clot forms in your deep veins, it may be called a deep vein thrombosis, or DVT for short. Blood clots can happen in any part of the body where blood flows, but they are most common in the arms and legs. If a piece of a blood clot breaks free and travels to the lungs, it is called a pulmonary embolus (PE). A PE can be a very serious problem.         Being in the hospital or having surgery can raise your  chances of getting a blood clot because you may not be well enough to move around as much as you normally do.         Ways you can help prevent blood clots in the hospital         Wearing SCDs. SCDs stands for Sequential Compression Devices.   SCDs are special sleeves that wrap around your legs  They attach to a pump that fills them with air to gently squeeze your legs every few minutes.   This helps return the blood in your legs to your heart.   SCDs should only be taken off when walking or bathing.   SCDs may not be comfortable, but they can help save your life.               Wearing compression stockings - if your doctor orders them. These special snug fitting stockings gently squeeze your legs to help blood flow.       Walking. Walking helps move the blood in your legs.   If your doctor says it is ok, try walking the halls at least   5 times a day. Ask us to help you get up, so you don't fall.      Taking any blood thinning medicines your doctor orders.        Page 1 of 2     [facility]; 3/23   Ways you can help prevent blood clots at home       Wearing compression stockings - if your doctor orders them. ? Walking - to help move the blood in your legs.       Taking any blood thinning medicines your doctor orders.      Signs of a blood clot or PE      Tell your doctor or nurse know right away if you have of the problems listed below.    If you are at home, seek medical care right away. Call 911 for chest pain or problems breathing.               Signs of a blood clot (DVT) - such as pain,  swelling, redness or warmth in your arm or leg      Signs of a pulmonary embolism (PE) - such as chest     pain or feeling short of breath

## 2025-01-10 NOTE — CPM/PAT H&P
CPM/PAT Evaluation       Name: Sim Guaman (Sim Guaman)  /Age: 1980/44 y.o.     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 Ferguson and Dr. Pimentel on 25.  Past Medical History:   Diagnosis Date    Anxiety     Arthritis     Cancer of soft palate (Multi)     Cataract     Dysphagia        Past Surgical History:   Procedure Laterality Date    CATARACT EXTRACTION         Patient Sexual activity questions deferred to the physician.    Family History   Problem Relation Name Age of Onset    Cancer Mother         No Known Allergies    Prior to Admission medications    Medication Sig Start Date End Date Taking? Authorizing Provider   clonazePAM (KlonoPIN) 1 mg tablet Take 1 tablet (1 mg) by mouth 2 times a day.   Yes Historical Provider, MD   multivitamin with minerals tablet Take 1 tablet by mouth once daily.   Yes Historical Provider, MD   oxyCODONE-acetaminophen (Percocet) 7.5-325 mg tablet Take 1 tablet by mouth every 6 hours if needed.   Yes Historical Provider, MD   ferrous gluconate 324 (37.5 Fe) MG tablet Take 1 tablet (324 mg) by mouth early in the morning..  Patient not taking: Reported on 1/10/2025 11/8/24 1/10/25  Historical Provider, MD MCDERMOTT ROS:   Constitutional:   neg    Neuro/Psych:    Headache  Eyes:   neg    Ears:   Nose:    sinus congestion  Mouth:    mouth pain   mouth lesions  Throat:    throat pain   dysphagia  Neck:    neck pain   neck swelling   neck stiffness  Cardio:   neg    Respiratory:   neg    Endocrine:   neg    GI:   neg    :   neg    Musculoskeletal:    arthralgias  Hematologic:   neg    Skin:  neg        Physical Exam  Vitals reviewed.   Constitutional:       Appearance: Normal  "appearance.   HENT:      Head: Normocephalic and atraumatic.      Nose: Congestion present.      Mouth/Throat:      Comments: Mass at back of throat  Cardiovascular:      Rate and Rhythm: Normal rate and regular rhythm.      Pulses: Normal pulses.      Heart sounds: Normal heart sounds.   Pulmonary:      Effort: Pulmonary effort is normal.      Breath sounds: Normal breath sounds.   Abdominal:      Palpations: Abdomen is soft.   Musculoskeletal:         General: Normal range of motion.      Cervical back: Normal range of motion.   Skin:     General: Skin is warm.   Neurological:      General: No focal deficit present.      Mental Status: He is alert and oriented to person, place, and time.   Psychiatric:         Mood and Affect: Mood normal.         Behavior: Behavior normal.          PAT AIRWAY:   Airway:     Mallampati::  II    TM distance::  >3 FB    Neck ROM::  Full      Testing/Diagnostic:     Patient Specialist/PCP:     Visit Vitals  /80   Pulse 76   Temp 37.4 °C (99.3 °F)   Resp 16   Ht 1.702 m (5' 7\")   Wt 57.2 kg (126 lb)   BMI 19.73 kg/m²   Smoking Status Former   BSA 1.64 m²       DASI Risk Score      Flowsheet Row Pre-Admission Testing from 1/10/2025 in [facility]   Can you take care of yourself (eat, dress, bathe, or use toilet)?  2.75 filed at 01/10/2025 1049   Can you walk indoors, such as around your house? 1.75 filed at 01/10/2025 1049   Can you walk a block or two on level ground?  2.75 filed at 01/10/2025 1049   Can you climb a flight of stairs or walk up a hill? 5.5 filed at 01/10/2025 1049   Can you run a short distance? 8 filed at 01/10/2025 1049   Can you do light work around the house like dusting or washing dishes? 2.7 filed at 01/10/2025 1049   Can you do moderate work around the house like vacuuming, sweeping floors or carrying groceries? 3.5 filed at 01/10/2025 1049   Can you do heavy work around the house like scrubbing floors or lifting and moving heavy furniture? "  0 filed at 01/10/2025 1049   Can you do yard work like raking leaves, weeding or pushing a mower? 0 filed at 01/10/2025 1049   Can you have sexual relations? 5.25 filed at 01/10/2025 1049   Can you participate in moderate recreational activities like golf, bowling, dancing, doubles tennis or throwing a baseball or football? 6 filed at 01/10/2025 1049   Can you participate in strenous sports like swimming, singles tennis, football, basketball, or skiing? 7.5 filed at 01/10/2025 1049   DASI SCORE 45.7 filed at 01/10/2025 1049   METS Score (Will be calculated only when all the questions are answered) 8.4 filed at 01/10/2025 1049          Caprini DVT Assessment      Flowsheet Row Pre-Admission Testing from 1/10/2025 in [facility]   DVT Score (IF A SCORE IS NOT CALCULATING, MUST SELECT A BMI TO COMPLETE) 9 filed at 01/10/2025 1108   Medical Factors Present cancer, chemotherapy, or previous malignancy filed at 01/10/2025 1108   Surgical Factors Major surgery planned, lasting over 3 hours filed at 01/10/2025 1108   BMI (BMI MUST BE CHOSEN) 30 or less filed at 01/10/2025 1108          Modified Frailty Index      Flowsheet Row Pre-Admission Testing from 1/10/2025 in [facility]   Non-independent functional status (problems with dressing, bathing, personal grooming, or cooking) 0 filed at 01/10/2025 1109   History of diabetes mellitus  0 filed at 01/10/2025 1109   History of COPD 0 filed at 01/10/2025 1109   History of CHF No filed at 01/10/2025 1109   History of MI 0 filed at 01/10/2025 1109   History of Percutaneous Coronary Intervention, Cardiac Surgery, or Angina No filed at 01/10/2025 1109   Hypertension requiring the use of medication  0 filed at 01/10/2025 1109   Peripheral vascular disease 0 filed at 01/10/2025 1109   Impaired sensorium (cognitive impairement or loss, clouding, or delirium) 0 filed at 01/10/2025 1109   TIA or CVA withouy residual deficit 0 filed at 01/10/2025 1100    Cerebrovascular accident with deficit 0 filed at 01/10/2025 1109   Modified Frailty Index Calculator 0 filed at 01/10/2025 1109          CHADS2 Stroke Risk         N/A 3 to 100%: High Risk   2 to < 3%: Medium Risk   0 to < 2%: Low Risk     Last Change: N/A          This score determines the patient's risk of having a stroke if the patient has atrial fibrillation.        This score is not applicable to this patient. Components are not calculated.          Revised Cardiac Risk Index      Flowsheet Row Pre-Admission Testing from 1/10/2025 in [facility] [place]   High-Risk Surgery (Intraperitoneal, Intrathoracic,Suprainguinal vascular) 0 filed at 01/10/2025 1109   History of ischemic heart disease (History of MI, History of positive exercuse test, Current chest paint considered due to myocardial ischemia, Use of nitrate therapy, ECG with pathological Q Waves) 0 filed at 01/10/2025 1109   History of congestive heart failure (pulmonary edemia, bilateral rales or S3 gallop, Paroxysmal nocturnal dyspnea, CXR showing pulmonary vascular redistribution) 0 filed at 01/10/2025 1109   History of cerebrovascular disease (Prior TIA or stroke) 0 filed at 01/10/2025 1109   Pre-operative insulin treatment 0 filed at 01/10/2025 1109   Pre-operative creatinine>2 mg/dl 0 filed at 01/10/2025 1109   Revised Cardiac Risk Calculator 0 filed at 01/10/2025 1109          Apfel Simplified Score      Flowsheet Row Pre-Admission Testing from 1/10/2025 in [facility] [place]   Smoking status 1 filed at 01/10/2025 1109   History of motion sickness or PONV  0 filed at 01/10/2025 1109   Use of postoperative opioids 1 filed at 01/10/2025 1109   Gender - Female 0=No filed at 01/10/2025 1109   Apfel Simplified Score Calculator 2 filed at 01/10/2025 1109          Risk Analysis Index Results This Encounter    No data found in the last 10 encounters.       Stop Bang Score      Flowsheet Row Pre-Admission Testing from 1/10/2025 in   [facility]   Do you snore loudly? 0 filed at 01/10/2025 1049   Do you often feel tired or fatigued after your sleep? 1 filed at 01/10/2025 1049   Has anyone ever observed you stop breathing in your sleep? 0 filed at 01/10/2025 1049   Do you have or are you being treated for high blood pressure? 0 filed at 01/10/2025 1049   Recent BMI (Calculated) 19.7 filed at 01/10/2025 1049   Is BMI greater than 35 kg/m2? 0=No filed at 01/10/2025 1049   Age older than 50 years old? 0=No filed at 01/10/2025 1049   Is your neck circumference greater than 17 inches (Male) or 16 inches (Female)? 0 filed at 01/10/2025 1049   Gender - Male 1=Yes filed at 01/10/2025 1049   STOP-BANG Total Score 2 filed at 01/10/2025 1049          Prodigy: High Risk  Total Score: 11              Prodigy Gender Score     Prodigy Previous Opioid Use Score           ARISCAT Score for Postoperative Pulmonary Complications    No data to display       Chiang Perioperative Risk for Myocardial Infarction or Cardiac Arrest (ERIK)    No data to display         Assessment and Plan:     Anesthesia  The patient denies problems with anesthesia in the past such as PONV, prolonged sedation, awareness, dental damage, aspiration, cardiac arrest, difficult intubation, or unexpected hospital admissions.     Neurology  The patient has no neurological diagnoses or significant findings on chart review, clinical presentation, and evaluation. No grossly apparent neurological perioperative risk. The patient is at increased risk for perioperative stroke secondary to general anesthesia, operative time >2.5 hours.    HEENT/Airway  The patient has cancer of the soft palate. No documented or reported history of airway difficulty.     Cardiovascular  The patient is scheduled for non-cardiac surgery associated with elevated risk. The patient has no major cardiac contraindications to non- cardiac surgery.  RCRI  The patient meets 0 RCRI criteria and therefor has a 3.9% risk  of major adverse cardiac complications.  METS  The patient's functional capacity capacity is greater than 4 METS.  EKG  The patient has no EKG or echocardiographic changes concerning for myocardial ischemia.   Heart Failure  The patient has no known history of heart failure.  Additionally, the patient reports no symptoms of heart failure and demonstrates no signs of heart failure.  Hypertension Evaluation  The patient has no known history of hypertension and has a normal blood pressure today.  Heart Rhythm Evaluation  The patient has no history of arrhythmias.  Heart Valve Evaluation  The patient has no known history of valvular heart disease. The patient has no symptoms or physical exam findings to suggest valvular heart disease.  Cardiology Evaluation  The patient is not followed by cardiology.    The patient has a 30-day risk for MACE of 0 predictors, 3.9% risk for cardiac death, nonfatal myocardial infarction, and nonfatal cardiac arrest.  ERIK score which indicates a 0.1% risk of intraoperative or 30-day postoperative MACE (major adverse cardiac event).    Pulmonary  No significant findings on chart review or clinical presentation and evaluation.    The patient has a stop bang score of 2, which places patient at low risk for having TERRI.    ARISCAT 23, low, 1.6% risk of in-hospital postoperative pulmonary complications  PRODIGY 8, intermediate risk of respiratory depression episode. Patient given PI sheet for preoperative deep breathing exercises.    Hematology  No diagnoses or significant findings on chart review or clinical presentation and evaluation.  Antiplatelet management   The patient is not currently receiving antiplatelet therapy.  Anticoagulation management  The patient is not currently receiving anticoagulation therapy.    Caprini score 9, high risk of perioperative VTE.     Patient instructed to ambulate as soon as possible postoperatively to decrease thromboembolic risk. Initiate mechanical DVT  prophylaxis as soon as possible and initiate chemical prophylaxis when deemed safe from a bleeding standpoint post surgery.     Transfusion Evaluation  A type and screen was obtained given the likelihood for perioperative transfusion of blood or blood products.    Gastrointestinal  The patient has dysphagia    Eat 10- 8,  self-perceived oropharyngeal dysphagia scale (0-40)     Genitourinary  No diagnoses or significant findings on chart review or clinical presentation and evaluation.    Renal  No renal diagnoses or significant findings on chart review or clinical presentation and evaluation. The patient has specific risk factors associated with increased risk of perioperative renal complications related to male gender.    Musculoskeletal  No diagnoses or significant findings on chart review or clinical presentation and evaluation.    Endocrine  Diabetes Evaluation  The patient has no history of diabetes mellitus.  Thyroid Disease Evaluation  The patient has no history of thyroid disease.    ID  No diagnoses or significant findings on chart review or clinical presentation and evaluation. MRSA screening obtained. Prescriptions and instructions given for Hibiclens and Peridex.    -Preoperative medication instructions were provided and reviewed with the patient.  Any additional testing or evaluation was explained to the patient.  NPO Instructions were discussed, and the patient's questions were answered prior to conclusion of this encounter. Patient verbalized understanding of preoperative instructions. After Visit Summary given.        Recent Results (from the past week)   CBC    Collection Time: 01/10/25 11:19 AM   Result Value Ref Range    WBC 9.5 4.4 - 11.3 x10*3/uL    nRBC 0.0 0.0 - 0.0 /100 WBCs    RBC 4.12 (L) 4.50 - 5.90 x10*6/uL    Hemoglobin 11.4 (L) 13.5 - 17.5 g/dL    Hematocrit 35.4 (L) 41.0 - 52.0 %    MCV 86 80 - 100 fL    MCH 27.7 26.0 - 34.0 pg    MCHC 32.2 32.0 - 36.0 g/dL    RDW 22.7 (H) 11.5 - 14.5 %     Platelets 439 150 - 450 x10*3/uL   Type And Screen    Collection Time: 01/10/25 11:19 AM   Result Value Ref Range    ABO TYPE A     Rh TYPE NEG     ANTIBODY SCREEN NEG    Comprehensive Metabolic Panel    Collection Time: 01/10/25 11:19 AM   Result Value Ref Range    Glucose 83 74 - 99 mg/dL    Sodium 138 136 - 145 mmol/L    Potassium 4.4 3.5 - 5.3 mmol/L    Chloride 100 98 - 107 mmol/L    Bicarbonate 28 21 - 32 mmol/L    Anion Gap 14 10 - 20 mmol/L    Urea Nitrogen 6 6 - 23 mg/dL    Creatinine 0.78 0.50 - 1.30 mg/dL    eGFR >90 >60 mL/min/1.73m*2    Calcium 8.9 8.6 - 10.6 mg/dL    Albumin 3.7 3.4 - 5.0 g/dL    Alkaline Phosphatase 72 33 - 120 U/L    Total Protein 7.2 6.4 - 8.2 g/dL    AST 28 9 - 39 U/L    Bilirubin, Total 0.2 0.0 - 1.2 mg/dL    ALT 14 10 - 52 U/L   Thyroid Stimulating Hormone    Collection Time: 01/10/25 11:19 AM   Result Value Ref Range    Thyroid Stimulating Hormone 2.12 0.44 - 3.98 mIU/L   Prealbumin    Collection Time: 01/10/25 11:19 AM   Result Value Ref Range    Prealbumin 13.7 (L) 18.0 - 40.0 mg/dL    Abnormal lab results reported to surgeon

## 2025-01-13 ENCOUNTER — SOCIAL WORK (OUTPATIENT)
Dept: CASE MANAGEMENT | Facility: HOSPITAL | Age: 45
End: 2025-01-13
Payer: COMMERCIAL

## 2025-01-13 NOTE — PROGRESS NOTES
Social Work Note    Identified Needs: Lodging, transportation  Impression and Plan: SW made call to Nisa, no answer. Left vm to return call. Will update as appropriate.   Interventions Provided: Assessment and Care Coordination

## 2025-01-14 ENCOUNTER — SOCIAL WORK (OUTPATIENT)
Dept: CASE MANAGEMENT | Facility: HOSPITAL | Age: 45
End: 2025-01-14
Payer: COMMERCIAL

## 2025-01-14 ENCOUNTER — TELEPHONE (OUTPATIENT)
Facility: CLINIC | Age: 45
End: 2025-01-14
Payer: COMMERCIAL

## 2025-01-14 NOTE — PROGRESS NOTES
Lodging Referral    Reason for Lodging: Surgery   Dates Needed: 1/19/2025  Ambulation: Independently  Caregiver: Casandra Guaman (wife)  Special Accommodations: None  Lodging Location: [place]   Referral submitted via fax on 01/14/25    Backup Lodging needed: No  Referral submitted No

## 2025-01-14 NOTE — TELEPHONE ENCOUNTER
"Casandra called and left me a message at 10:08 a.m. asking that I call them back.  \"Nima has some questions.\"  I called them back and we spoke.     Nima asked if he could wear a hoodie after the surgery.  I suggested a zip up better-vs-over the head hoodies due to the drains, tubes, IV, etc.  \"So, I'm just going to freeze.\"   \"I don't want to suffer in pain, in cold, etc.\"  I reassured him multiple times in our 20 minute phone conversation that we will make sure his pain is under control and that he's not cold.  I informed him that we have a pain management team to help us with pain control.  2.  He stated that he was told the trach and feeding tube being removed ASAP after surgery.  I informed him that the trach would likely be removed before the feeding tube.  \"They said it would be removed before I went home.\"  I explained not the feeding tube as it will be a while before he will be allowed to eat by mouth again.  We have to have him healed.  The trach MIGHT be removed before discharge.  It will depend on how much swelling he has after the surgery.  NO GUARANTEES that it will be removed before discharge but we are hopeful.  3.  He's also worried about seeing the [employer] football game the night of his surgery, having to use a bed pan and lying around in bed.  I informed him he would have a TV in his room, but how much he will see depends on how awake he is after the surgery.  I reminded him that pain medication also can make people sleepy.  I stated that he would be up and in a chair later that night or the next morning at that latest.  There is a bathroom in his room and the nursing staff will help him walk into the bathroom when needed.  Again I stressed that he will need help due to the tubes, IV, etc.    He admits to just being very scared and wants to be around to take care of his family.  I reassured him that I understood his fear, and it's our goal as well to get him well and home to take care " of his family.

## 2025-01-16 ENCOUNTER — SOCIAL WORK (OUTPATIENT)
Dept: CASE MANAGEMENT | Facility: HOSPITAL | Age: 45
End: 2025-01-16
Payer: COMMERCIAL

## 2025-01-16 NOTE — PROGRESS NOTES
Social Work Note    Meeting Location: Phone  Person(s) Present: Casandra (wife), Kasandra LSW   Identified Needs: Lodging, Transportation   Impression and Plan: SHASHI made call to [facility] to see if a room was offered to Nisa, representative shared that there are no rooms available. SHASHI made call to [facility] to secure ride for 1/20/2025, details listed below. SHASHI then made call to Nima and Casandra to review new details and adjusted plan. No answer, vm left. SHASHI consulted with [employer] for any free lodging, representative shared that [facility] is the only option that may be free/reduced costs.     UPDATE: Nima called back and said that Casandra would be back at home around 6:30-6:45 tonight but desperately needed for conversation as it is painful to talk for long periods of time. During call, Nima expressed that he has bad luck and that he is frustrated that Casandra cannot be driven back home through insurance. Shared that he cannot do this alone. SW offered blameless apologies and validation of emotional need for supportive partner. He explained that it would be $250 taxi expense to transport Casandra back home. This is not feasible as he explained that they are financially struggling. He explained that they asked many friends and Casandra's family and no one is able to pick her up and bring her back down to [place]. SHASHI did confirm with [facility] that they are unable to transport Casandra back to [place] without Nima there as it is his transportation benefit.    SHASHI spoke with wife Casandra, she explained that Nima would be more open to driving in early in the morning rather than working on lodging. SHASHI explained that trip is scheduled.  Casandra explained that she has been contacting everyone and no one is willing to drive her back home. SHASHI shared seeking for financial assistance for gas to assist her, she is appreciative. SHASHI will follow up tomorrow with any resources found quickly. Will continue to follow.  "  Interventions Provided: Advocacy, Assessment, Care Coordination, De-Escalation, and Supportive Counseling  Estimated Time Spent: 50 minutes     Transportation Referral     Ambulation: Independently  Pick-up Address: [address] [place] [id number]  Patient Phone: [phone]  Accompaniment: Yes,   Ambulation?: Independently  Phone Receives Text Messages? Yes  Transportation Service: [employer] (p\" [phone])      Scheduled Ride(s):     Date:  1/20/2025  Appointment:  Surgery   Drop off Address: [address] [place] [address]   Time:  2:30 am  Return Ride:  N/A  Confirmation: [id number]        "

## 2025-01-17 ENCOUNTER — ANESTHESIA EVENT (OUTPATIENT)
Dept: OPERATING ROOM | Facility: HOSPITAL | Age: 45
End: 2025-01-17

## 2025-01-17 ENCOUNTER — SOCIAL WORK (OUTPATIENT)
Dept: CASE MANAGEMENT | Facility: HOSPITAL | Age: 45
End: 2025-01-17
Payer: COMMERCIAL

## 2025-01-17 NOTE — TELEPHONE ENCOUNTER
"I received an email that from DEANDRE Slaughter, that she received a message from Casandra that Nima wanted the office to call him as he had more questions.    I called and spoke with him.  He wanted to know how long he would be in the hospital--5-7 days.    He then went on and we had a repeat of the conversation we had on 1/14/25:  How he didn't want to be uncomfortable, cold and suffering.  That he's bringing lost of clothes (3 bags worth of stuff) with him the the hospital with lots of sweatshirts so that he's not cold.    I encouraged him to think about reducing the number of bags he was bringing to the hospital.  We have blankets to help keep him warm.    \"Okay.  Thank you.\"      "

## 2025-01-17 NOTE — PROGRESS NOTES
Pharmacy Medication History Review    Sim Guaman is a 44 y.o. male who is planned to be admitted for Cancer of soft palate (Multi). Pharmacy called the patient prior to their scheduled procedure and reviewed the patient's [id number] medications for accuracy.    Medications ADDED:  none  Medications CHANGED:  Clonazepam 1mg directions from #1BID to #1Q6h  Medications REMOVED:   Buspirone 5mg  Hydromorphone 2mg  Oxycodone/apap 7.5/325mg    Please review updated prior to admission medication list and comments regarding how patient may be taking medications differently by going to Admission tab --> Admission Orders --> Admit Orders / Review prior to admission medications.     Preferred pharmacy, last doses of medications, and allergies to be confirmed with patient by nursing the day of procedure.     Sources used to complete the med history include:  [facility]  Pharmacy dispense history  Spouse  Chart Review  Care Everywhere     Below are additional concerns with the patient's PTA list.  Spoke with patient's spouse - ashely -  Confirmed patient is no longer taking buspirone 5mg. L.F. 12/23/24 #60/30d  confirmed patient is taking #1 tablet of clonazepam 1mg every 6 hours. OARRS verified. L.F. 01/09/25 #56/14d  Spouse states patient is NOT taking hydromorphone 2mg. L.F. 01/02/25 #84/7d. OARRS verified  Spouse states patient is NOT taking percocet 7.5-325mg OR 10-325mg. Per OARRS patient received percocet 10-325mg #180/30d L.F. 12/23/24. (Per OARRS 7.5-325mg L.F. 11/22/24 #120/30d)    Delmi Barfield [facility]  Please reach out via Secure Chat for questions or call [employer] or [employer]

## 2025-01-17 NOTE — PROGRESS NOTES
Social Work Note    Meeting Location: Phone  Person(s) Present: Casandra (wife), Kasandra LSW   Identified Needs: Transportation information  Impression and Plan: SW sent message to Nima's wife, Casandra and shared that unfortunately there are no grants/financial assistance that can be accessed before Monday. Additionally, shared [employer] number and reference number for trip should any issues arise early Monday morning. SW will continue to follow.   Interventions Provided: Care Coordination

## 2025-01-20 ENCOUNTER — SOCIAL WORK (OUTPATIENT)
Dept: CASE MANAGEMENT | Facility: HOSPITAL | Age: 45
End: 2025-01-20
Payer: COMMERCIAL

## 2025-01-20 ENCOUNTER — ANESTHESIA (OUTPATIENT)
Dept: OPERATING ROOM | Facility: HOSPITAL | Age: 45
End: 2025-01-20
Payer: COMMERCIAL

## 2025-01-20 NOTE — PROGRESS NOTES
Social Work Note    Meeting Location: Phone  Person(s) Present: Casandra (wife), Kasandra LSW   Identified Needs: Rescheduled appointment, Emotional support  Impression and Plan: Casandra called SW and shared details of what happened with the mishap with transportation last night into early this morning. SW provided empathy and blameless apologies. She asked that SW connect with MIGUELINA Elena to contact her. SW shared that there are no updates as of yet for rescheduling surgery as it takes a lot of coordination for two surgeons to align schedules to be in one OR. Casandra verbalized understanding. SW shared that as soon as there is a confirmed date either MIGUELINA Elena or SHASHI will reach out to inform her and Nima. SW updated RN of conversation, will continue to collaborate. Will continue to follow.   Interventions Provided: Care Coordination and Supportive Counseling  Estimated Time Spent: 25 minutes

## 2025-01-20 NOTE — PROGRESS NOTES
Social Work Note    Meeting Location: Phone  Person(s) Present: Casandra (wife), Kasandra LSW   Identified Needs: Transportation   Impression and Plan: SHASHI received weekend text/call (19:35 Sunday) from Sim's wife, Casandra that transportation was scheduled however, the cab service that [facility] contracted through,  cab did not accept the ride. SW made call to [facility] and explained the issue. [facility] representative shared that on her end, there was no issues. Despite rephrasing many times, representative declared there were no issues. SW made call to  cab, spoke to representative Mitch shared that he can see the contracted ride however, he cannot approve it. He explained that he had been trying to get in touch with his boss to administratively approve the ride but had been unsuccessful in reaching him. SW inquired if the ride is not approved through their company, if they contact [facility] to inform them so other arrangements could be made. Mitch shared that the course of action was correct. SHASHI concluded calls at 21:00. SW received many calls, voicemail's, and texts throughout the night that [facility] could not secure a ride presumable due to the weather. SHASHI messaged MIGUELINA Elena and let her know situation. Surgery will be rescheduled for next month. SHASHI will continue to follow.    Interventions Provided: Advocacy and Assessment  Estimated Time Spent: 120+

## 2025-01-22 ENCOUNTER — TELEPHONE (OUTPATIENT)
Dept: OTOLARYNGOLOGY | Facility: CLINIC | Age: 45
End: 2025-01-22
Payer: COMMERCIAL

## 2025-01-22 NOTE — TELEPHONE ENCOUNTER
I called and left a message for Nima and Casandra that I will try them again later today about plans to reschedule Nima's surgery.    He didn't show on 1/20/25 due to transportation issues.    I tried calling Casandra at 1 p.m. and again got her voice mail.  I left a message that I called and will try her back later today.    I tried calling Casandra again at 4:08 p.m. and again got her voice mail.  I left another message that I called and that I will try her again tomorrow.

## 2025-01-23 ENCOUNTER — SOCIAL WORK (OUTPATIENT)
Dept: CASE MANAGEMENT | Facility: HOSPITAL | Age: 45
End: 2025-01-23
Payer: COMMERCIAL

## 2025-01-23 NOTE — PROGRESS NOTES
"Transportation Referral     Ambulation: Independently  Pick-up Address: [address] [place] [address]  Patient Phone: [phone]  Accompaniment: Yes,   Ambulation?: Independently  Phone Receives Text Messages? Yes  Transportation Service: [employer] (p\" [phone])      Scheduled Ride(s):     Date:  1/28/2025  Appointment:  Oncology Surgery   Drop off Address: [facility]: [address] [place] [address]   Time:  2:30-3:00, likely 2:45  Return Ride:  N/A  Confirmation: [id number]    SHASHI relayed information to Nima's wife, Casandra. SHASHI will call [facility] tomorrow and on 1/27 to verify vendor and confirm that trip has been selected and accepted. SHASHI strongly encouraged Casandra to have a plan B in the even there is any issues with transportation again. She verbalized understanding. Will provide meaningful update when available.   "

## 2025-01-23 NOTE — TELEPHONE ENCOUNTER
Casandra and I connected this morning.  I had a very ede conversation with her.  I stated that I understand their situation (transportation issues, financial issues, etc.) and her position as Nima's wife.  However, I've also been made aware that Nima has not been open to the suggestions made by Kasandra our  to help them work around the issues so that he can get to  for his surgery.  I further stated that it's not east to find an all day OR room to reschedule the surgery.    I stated that right now, I was able to get OR time on Tuesday, January 28th, however the surgery would be with Dr. Ferguson and a different surgeon (NOT Dr. Pimentel).  Nima would have to be okay with that and be willing to meet her the morning or surgery.  If he's not okay with this, then the next date is not until 2/17/25.  However, this could change to further out if someone gets booked into that date.    Casandra stated 1/28/25 would be fine.  I stressed it will be imperative that when Kasandra calls with what she was able to secure transportation/hotel madden to ensure Nima will be at the hospital in time for the surgery, Nima MUST be willing to do what is needed.    She indicated she understood and will wait to hear from Kasandra.  I apologized for being so direct, but we can't help Nima unless he's willing to help us help him.    Kasandra was updated on my conversation/connection with Casandra this morning.    I did ask Casandra if she would need some sort of not for work.  She wasn't sure but will check and let me know if she needs something.

## 2025-01-24 ENCOUNTER — SOCIAL WORK (OUTPATIENT)
Dept: CASE MANAGEMENT | Facility: HOSPITAL | Age: 45
End: 2025-01-24
Payer: COMMERCIAL

## 2025-01-24 NOTE — PROGRESS NOTES
Social Work Note    Person(s) Present: Kasandra ROBERSON, [facility] representative, [facility] cab representative   Identified Needs: Transportation  Impression and Plan: SW made call to [facility], representative shared that trip was contracted through [facility]. SW made call to [facility] [phone], they shared they will call Monday and confirm trip. SW updated Casandra, will call on Monday and verify trip.   Interventions Provided: Advocacy and Assessment  Estimated Time Spent: 10 minutes

## 2025-01-27 ENCOUNTER — SOCIAL WORK (OUTPATIENT)
Dept: CASE MANAGEMENT | Facility: HOSPITAL | Age: 45
End: 2025-01-27
Payer: COMMERCIAL

## 2025-01-27 ENCOUNTER — ANESTHESIA EVENT (OUTPATIENT)
Dept: OPERATING ROOM | Facility: HOSPITAL | Age: 45
End: 2025-01-27
Payer: COMMERCIAL

## 2025-01-27 ENCOUNTER — TELEPHONE (OUTPATIENT)
Dept: PASTORAL CARE | Facility: HOSPITAL | Age: 45
End: 2025-01-27
Payer: COMMERCIAL

## 2025-01-27 NOTE — PROGRESS NOTES
Spiritual Care Visit      introduced self and spiritual care services to the patient over the phone, explaining services available and checking in to see how patient is doing today. We spoke about his upcoming surgery, how he is feeling beforehand, and how their family is coping through it.     Patient asked for ongoing support and care through his admission. He spoke about his worries about being prepared for the admission and being far away from his home.     Spiritual care will continue follow while the patient is at [facility] during this admission.      Rev. Lino Mike, Supportive Oncology

## 2025-01-27 NOTE — PROGRESS NOTES
Pharmacy Medication History Review    Sim Guaman is a 44 y.o. male who is planned to be admitted for No Principal Problem: There is no principal problem currently on the Problem List. Please update the Problem List and refresh.. Pharmacy called the patient prior to their scheduled procedure and reviewed the patient's [id number] medications for accuracy.    Medications ADDED:  none  Medications CHANGED:  none  Medications REMOVED:   Hydromorphone 2mg  Oxycodone/apap 7.5-325mg    Please review updated prior to admission medication list and comments regarding how patient may be taking medications differently by going to Admission tab --> Admission Orders --> Admit Orders / Review prior to admission medications.     Preferred pharmacy, last doses of medications, and allergies to be confirmed with patient by nursing the day of procedure.     Sources used to complete the med history include:  [facility]  Pharmacy dispense history  Spouse  Chart Review  Care Everywhere     Below are additional concerns with the patient's PTA list.  Spoke with patient's spouse - ashely -   Confirmed patient is no longer taking buspirone 5mg. L.F. 12/23/24 #60/30d  Confirmed patient is taking #1 tablet of clonazepam 1mg every 6 hours. L.F. 01/21/25 #40/10d. O[facility]RS verified  Spouse states patient is NOT taking hydromorphone 2mg. L.F. 01/02/25 #84/7d. [facility]RS verified  Spouse states patient is NOT taking percocet 7.5-325mg OR 10-325mg. Per O[facility] patient received percocet 10-325mg #180/30d L.F. 12/23/24 and received percocet 7.5-325mg #120/30d L.F. 11/22/24    Delmi Barfield  [facility]  Please reach out via Secure Chat for questions or call [facility] or [employer]

## 2025-01-27 NOTE — PROGRESS NOTES
"Social Work Note    Meeting Location: Phone  Person(s) Present: Kasandra Herring   Identified Needs: Transportation   Impression and Plan: SHASHI received multiple calls and voicemail's from Nima sharing that \"they are doing it again, they are trying to not pick me up again, I keep saying I'm Christopher Rand they say they don't know who I am\". SHASHI called back and Nima said \"they\" keep calling him. SHASHI asked for clarification as to who is \"they\" he said it is [employer]. SW offered to call company on his behalf and talk with them to see what is happening. Nima shared that would be helpful but is doubtful because it is a \"human computer that is not a human\" and then said he is scared. SHASHI provided empathy and shared frustrations over talking to automated systems and that the surgery is intimidating but to take one thing at a time and that SW will call back with an update. SHASHI made call to [employer] [phone] and spoke to representative who shared that they are confirmed to pick Nima up on a one way trip to [place] at 2:30 tomorrow morning.  SHASHI made call back to Nima sharing information, he reiterated frustration over call, SW offered validation of frustrations. However, affirmed that [employer] is picking up Nima at 2:30 and transporting them to [place] tomorrow morning. He is appreciative of efforts and call back. He asked if SHASHI needed to talk with Casandra, shared that SHASHI and Casandra texted, shared information back and forth. Nima asked if SHASHI would be there, shared that working hours begin at 0800, but will check in tomorrow as well SHASHI could consult with , Nima is agreeable. SHASHI will check in tomorrow after surgery and consult spiritual care.   Interventions Provided: Advocacy, Assessment, De-Escalation, and Supportive Counseling  Estimated Time Spent: 20 minutes       "

## 2025-01-28 ENCOUNTER — ANESTHESIA (OUTPATIENT)
Dept: OPERATING ROOM | Facility: HOSPITAL | Age: 45
End: 2025-01-28
Payer: COMMERCIAL

## 2025-01-28 ENCOUNTER — HOSPITAL ENCOUNTER (INPATIENT)
Facility: HOSPITAL | Age: 45
End: 2025-01-28
Attending: STUDENT IN AN ORGANIZED HEALTH CARE EDUCATION/TRAINING PROGRAM | Admitting: OTOLARYNGOLOGY
Payer: COMMERCIAL

## 2025-01-28 DIAGNOSIS — F41.9 ANXIETY: ICD-10-CM

## 2025-01-28 DIAGNOSIS — R21 RASH: ICD-10-CM

## 2025-01-28 DIAGNOSIS — C05.1 CANCER OF SOFT PALATE (MULTI): Primary | ICD-10-CM

## 2025-01-28 LAB
ABO GROUP (TYPE) IN BLOOD: NORMAL
ANION GAP BLDA CALCULATED.4IONS-SCNC: 9 MMO/L (ref 10–25)
BASE EXCESS BLDA CALC-SCNC: 0.1 MMOL/L (ref -2–3)
BODY TEMPERATURE: 37 DEGREES CELSIUS
CA-I BLDA-SCNC: 0.97 MMOL/L (ref 1.1–1.33)
CHLORIDE BLDA-SCNC: 107 MMOL/L (ref 98–107)
GLUCOSE BLDA-MCNC: 131 MG/DL (ref 74–99)
HCO3 BLDA-SCNC: 24.7 MMOL/L (ref 22–26)
HCT VFR BLD EST: 28 % (ref 41–52)
HGB BLDA-MCNC: 9.3 G/DL (ref 13.5–17.5)
INHALED O2 CONCENTRATION: 43 %
LACTATE BLDA-SCNC: 1 MMOL/L (ref 0.4–2)
OXYHGB MFR BLDA: 97.1 % (ref 94–98)
PCO2 BLDA: 39 MM HG (ref 38–42)
PH BLDA: 7.41 PH (ref 7.38–7.42)
PO2 BLDA: 177 MM HG (ref 85–95)
POTASSIUM BLDA-SCNC: 3.2 MMOL/L (ref 3.5–5.3)
RH FACTOR (ANTIGEN D): NORMAL
SAO2 % BLDA: 99 % (ref 94–100)
SODIUM BLDA-SCNC: 137 MMOL/L (ref 136–145)

## 2025-01-28 PROCEDURE — 3600000009 HC OR TIME - EACH INCREMENTAL 1 MINUTE - PROCEDURE LEVEL FOUR: Performed by: STUDENT IN AN ORGANIZED HEALTH CARE EDUCATION/TRAINING PROGRAM

## 2025-01-28 PROCEDURE — 2720000007 HC OR 272 NO HCPCS: Performed by: STUDENT IN AN ORGANIZED HEALTH CARE EDUCATION/TRAINING PROGRAM

## 2025-01-28 PROCEDURE — 2500000005 HC RX 250 GENERAL PHARMACY W/O HCPCS: Mod: SE | Performed by: STUDENT IN AN ORGANIZED HEALTH CARE EDUCATION/TRAINING PROGRAM

## 2025-01-28 PROCEDURE — 88305 TISSUE EXAM BY PATHOLOGIST: CPT | Mod: TC,SUR,WESLAB | Performed by: OTOLARYNGOLOGY

## 2025-01-28 PROCEDURE — 0CBM0ZZ EXCISION OF PHARYNX, OPEN APPROACH: ICD-10-PCS | Performed by: OTOLARYNGOLOGY

## 2025-01-28 PROCEDURE — 36620 INSERTION CATHETER ARTERY: CPT

## 2025-01-28 PROCEDURE — 3700000002 HC GENERAL ANESTHESIA TIME - EACH INCREMENTAL 1 MINUTE: Performed by: STUDENT IN AN ORGANIZED HEALTH CARE EDUCATION/TRAINING PROGRAM

## 2025-01-28 PROCEDURE — 7100000001 HC RECOVERY ROOM TIME - INITIAL BASE CHARGE: Performed by: STUDENT IN AN ORGANIZED HEALTH CARE EDUCATION/TRAINING PROGRAM

## 2025-01-28 PROCEDURE — 40845 RECONSTRUCTION OF MOUTH: CPT | Performed by: STUDENT IN AN ORGANIZED HEALTH CARE EDUCATION/TRAINING PROGRAM

## 2025-01-28 PROCEDURE — P9045 ALBUMIN (HUMAN), 5%, 250 ML: HCPCS | Mod: JZ,SE

## 2025-01-28 PROCEDURE — 2500000004 HC RX 250 GENERAL PHARMACY W/ HCPCS (ALT 636 FOR OP/ED): Mod: SE | Performed by: STUDENT IN AN ORGANIZED HEALTH CARE EDUCATION/TRAINING PROGRAM

## 2025-01-28 PROCEDURE — C1713 ANCHOR/SCREW BN/BN,TIS/BN: HCPCS | Performed by: STUDENT IN AN ORGANIZED HEALTH CARE EDUCATION/TRAINING PROGRAM

## 2025-01-28 PROCEDURE — 3700000001 HC GENERAL ANESTHESIA TIME - INITIAL BASE CHARGE: Performed by: STUDENT IN AN ORGANIZED HEALTH CARE EDUCATION/TRAINING PROGRAM

## 2025-01-28 PROCEDURE — 15100 SPLT AGRFT T/A/L 1ST 100SQCM: CPT | Performed by: STUDENT IN AN ORGANIZED HEALTH CARE EDUCATION/TRAINING PROGRAM

## 2025-01-28 PROCEDURE — 15758 FREE FASCIAL FLAP MICROVASC: CPT | Performed by: STUDENT IN AN ORGANIZED HEALTH CARE EDUCATION/TRAINING PROGRAM

## 2025-01-28 PROCEDURE — 3E0G76Z INTRODUCTION OF NUTRITIONAL SUBSTANCE INTO UPPER GI, VIA NATURAL OR ARTIFICIAL OPENING: ICD-10-PCS | Performed by: ANESTHESIOLOGY

## 2025-01-28 PROCEDURE — 0DH63UZ INSERTION OF FEEDING DEVICE INTO STOMACH, PERCUTANEOUS APPROACH: ICD-10-PCS | Performed by: ANESTHESIOLOGY

## 2025-01-28 PROCEDURE — 2060000001 HC INTERMEDIATE ICU ROOM DAILY

## 2025-01-28 PROCEDURE — 2500000005 HC RX 250 GENERAL PHARMACY W/O HCPCS: Mod: SE | Performed by: ANESTHESIOLOGY

## 2025-01-28 PROCEDURE — 2500000004 HC RX 250 GENERAL PHARMACY W/ HCPCS (ALT 636 FOR OP/ED): Mod: SE

## 2025-01-28 PROCEDURE — 42120 REMOVE PALATE/LESION: CPT | Performed by: OTOLARYNGOLOGY

## 2025-01-28 PROCEDURE — 38724 REMOVAL OF LYMPH NODES NECK: CPT | Performed by: OTOLARYNGOLOGY

## 2025-01-28 PROCEDURE — A4312 CATH W/O BAG 2-WAY SILICONE: HCPCS | Performed by: STUDENT IN AN ORGANIZED HEALTH CARE EDUCATION/TRAINING PROGRAM

## 2025-01-28 PROCEDURE — 31600 PLANNED TRACHEOSTOMY: CPT | Performed by: OTOLARYNGOLOGY

## 2025-01-28 PROCEDURE — 42890 LIMITED PHARYNGECTOMY: CPT | Performed by: OTOLARYNGOLOGY

## 2025-01-28 PROCEDURE — 0CJS8ZZ INSPECTION OF LARYNX, VIA NATURAL OR ARTIFICIAL OPENING ENDOSCOPIC: ICD-10-PCS | Performed by: OTOLARYNGOLOGY

## 2025-01-28 PROCEDURE — 2500000001 HC RX 250 WO HCPCS SELF ADMINISTERED DRUGS (ALT 637 FOR MEDICARE OP): Mod: SE | Performed by: STUDENT IN AN ORGANIZED HEALTH CARE EDUCATION/TRAINING PROGRAM

## 2025-01-28 PROCEDURE — 2780000003 HC OR 278 NO HCPCS: Performed by: STUDENT IN AN ORGANIZED HEALTH CARE EDUCATION/TRAINING PROGRAM

## 2025-01-28 PROCEDURE — 61605 RESECT/EXCISE CRANIAL LESION: CPT | Performed by: OTOLARYNGOLOGY

## 2025-01-28 PROCEDURE — 2500000004 HC RX 250 GENERAL PHARMACY W/ HCPCS (ALT 636 FOR OP/ED): Mod: SE | Performed by: SURGERY

## 2025-01-28 PROCEDURE — 07T10ZZ RESECTION OF RIGHT NECK LYMPHATIC, OPEN APPROACH: ICD-10-PCS | Performed by: OTOLARYNGOLOGY

## 2025-01-28 PROCEDURE — 88307 TISSUE EXAM BY PATHOLOGIST: CPT | Mod: TC,SUR,WESLAB | Performed by: STUDENT IN AN ORGANIZED HEALTH CARE EDUCATION/TRAINING PROGRAM

## 2025-01-28 PROCEDURE — 0HBJXZZ EXCISION OF LEFT UPPER LEG SKIN, EXTERNAL APPROACH: ICD-10-PCS | Performed by: STUDENT IN AN ORGANIZED HEALTH CARE EDUCATION/TRAINING PROGRAM

## 2025-01-28 PROCEDURE — 0CB30ZZ EXCISION OF SOFT PALATE, OPEN APPROACH: ICD-10-PCS | Performed by: STUDENT IN AN ORGANIZED HEALTH CARE EDUCATION/TRAINING PROGRAM

## 2025-01-28 PROCEDURE — 36415 COLL VENOUS BLD VENIPUNCTURE: CPT | Performed by: ANESTHESIOLOGY

## 2025-01-28 PROCEDURE — 0HREX74 REPLACEMENT OF LEFT LOWER ARM SKIN WITH AUTOLOGOUS TISSUE SUBSTITUTE, PARTIAL THICKNESS, EXTERNAL APPROACH: ICD-10-PCS | Performed by: STUDENT IN AN ORGANIZED HEALTH CARE EDUCATION/TRAINING PROGRAM

## 2025-01-28 PROCEDURE — 2500000004 HC RX 250 GENERAL PHARMACY W/ HCPCS (ALT 636 FOR OP/ED): Mod: JZ,SE

## 2025-01-28 PROCEDURE — 43246 EGD PLACE GASTROSTOMY TUBE: CPT | Performed by: SURGERY

## 2025-01-28 PROCEDURE — 7100000002 HC RECOVERY ROOM TIME - EACH INCREMENTAL 1 MINUTE: Performed by: STUDENT IN AN ORGANIZED HEALTH CARE EDUCATION/TRAINING PROGRAM

## 2025-01-28 PROCEDURE — 88331 PATH CONSLTJ SURG 1 BLK 1SPC: CPT | Performed by: STUDENT IN AN ORGANIZED HEALTH CARE EDUCATION/TRAINING PROGRAM

## 2025-01-28 PROCEDURE — 3600000004 HC OR TIME - INITIAL BASE CHARGE - PROCEDURE LEVEL FOUR: Performed by: STUDENT IN AN ORGANIZED HEALTH CARE EDUCATION/TRAINING PROGRAM

## 2025-01-28 PROCEDURE — 61590 INFRATEMPORAL APPROACH/SKULL: CPT | Performed by: OTOLARYNGOLOGY

## 2025-01-28 PROCEDURE — 07T20ZZ RESECTION OF LEFT NECK LYMPHATIC, OPEN APPROACH: ICD-10-PCS | Performed by: OTOLARYNGOLOGY

## 2025-01-28 PROCEDURE — C1729 CATH, DRAINAGE: HCPCS | Performed by: STUDENT IN AN ORGANIZED HEALTH CARE EDUCATION/TRAINING PROGRAM

## 2025-01-28 PROCEDURE — 88305 TISSUE EXAM BY PATHOLOGIST: CPT | Performed by: STUDENT IN AN ORGANIZED HEALTH CARE EDUCATION/TRAINING PROGRAM

## 2025-01-28 PROCEDURE — 0B110F4 BYPASS TRACHEA TO CUTANEOUS WITH TRACHEOSTOMY DEVICE, OPEN APPROACH: ICD-10-PCS | Performed by: OTOLARYNGOLOGY

## 2025-01-28 PROCEDURE — 2500000005 HC RX 250 GENERAL PHARMACY W/O HCPCS: Mod: SE | Performed by: OTOLARYNGOLOGY

## 2025-01-28 PROCEDURE — 0CDXXZ0 EXTRACTION OF LOWER TOOTH, SINGLE, EXTERNAL APPROACH: ICD-10-PCS | Performed by: OTOLARYNGOLOGY

## 2025-01-28 PROCEDURE — 84132 ASSAY OF SERUM POTASSIUM: CPT

## 2025-01-28 PROCEDURE — 2500000004 HC RX 250 GENERAL PHARMACY W/ HCPCS (ALT 636 FOR OP/ED): Mod: JZ,SE | Performed by: ANESTHESIOLOGY

## 2025-01-28 PROCEDURE — 2500000004 HC RX 250 GENERAL PHARMACY W/ HCPCS (ALT 636 FOR OP/ED): Mod: SE | Performed by: OTOLARYNGOLOGY

## 2025-01-28 DEVICE — THE GEM MICROVASCULAR ANASTOMOTIC COUPLER DEVICE RINGS ARE MADE OF POLYETHYLENE AND SURGICAL GRADE STAINLESS STEEL PINS. A PROTECTIVE COVER AND JAW ASSEMBLY PROTECT THE RINGS AND ALLOW FOR EASY LOADING ONTO THE ANASTOMOTIC INSTRUMENT. BOTH THE PROTECTIVE COVER AND JAW ASSEMBLY ARE DISPOSABLE. THE GEM MICROVASCULAR ANASTOMOTIC COUPLER DEVICE IS SINGLE-USE AND AVAILABLE IN VARIOUS SIZES
Type: IMPLANTABLE DEVICE | Site: NECK | Status: FUNCTIONAL
Brand: GEM MICROVASCULAR ANASTOMOTIC COUPLER

## 2025-01-28 DEVICE — SCREW, LOCK 2.0 X 11 MAXDRV TI ALLOY: Type: IMPLANTABLE DEVICE | Site: MANDIBLE | Status: FUNCTIONAL

## 2025-01-28 DEVICE — IMPLANTABLE DEVICE: Type: IMPLANTABLE DEVICE | Site: MANDIBLE | Status: FUNCTIONAL

## 2025-01-28 DEVICE — SCREW, LOCK 2.0 X 13 MAXDRV TI ALLOY: Type: IMPLANTABLE DEVICE | Site: MANDIBLE | Status: FUNCTIONAL

## 2025-01-28 DEVICE — PERCUTANEOUS ENDOSCOPIC GASTROSTOMY KIT ENFIT
Type: IMPLANTABLE DEVICE | Site: ABDOMEN | Status: FUNCTIONAL
Brand: ENDOVIVE SAFETY PEG KIT

## 2025-01-28 DEVICE — IMPLANTABLE DEVICE: Type: IMPLANTABLE DEVICE | Site: MANDIBLE | Status: NON-FUNCTIONAL

## 2025-01-28 RX ORDER — HYDROMORPHONE HCL/0.9% NACL/PF 15 MG/30ML
PATIENT CONTROLLED ANALGESIA SYRINGE INTRAVENOUS CONTINUOUS
Status: DISCONTINUED | OUTPATIENT
Start: 2025-01-28 | End: 2025-01-31

## 2025-01-28 RX ORDER — SODIUM CHLORIDE, SODIUM LACTATE, POTASSIUM CHLORIDE, CALCIUM CHLORIDE 600; 310; 30; 20 MG/100ML; MG/100ML; MG/100ML; MG/100ML
50 INJECTION, SOLUTION INTRAVENOUS CONTINUOUS
Status: ACTIVE | OUTPATIENT
Start: 2025-01-28 | End: 2025-01-29

## 2025-01-28 RX ORDER — ONDANSETRON HYDROCHLORIDE 2 MG/ML
4 INJECTION, SOLUTION INTRAVENOUS ONCE AS NEEDED
Status: COMPLETED | OUTPATIENT
Start: 2025-01-28 | End: 2025-01-29

## 2025-01-28 RX ORDER — LIDOCAINE HCL/PF 100 MG/5ML
SYRINGE (ML) INTRAVENOUS AS NEEDED
Status: DISCONTINUED | OUTPATIENT
Start: 2025-01-28 | End: 2025-01-28

## 2025-01-28 RX ORDER — ALBUMIN HUMAN 50 G/1000ML
SOLUTION INTRAVENOUS AS NEEDED
Status: DISCONTINUED | OUTPATIENT
Start: 2025-01-28 | End: 2025-01-28

## 2025-01-28 RX ORDER — PHENYLEPHRINE 10 MG/250 ML(40 MCG/ML)IN 0.9 % SOD.CHLORIDE INTRAVENOUS
CONTINUOUS PRN
Status: DISCONTINUED | OUTPATIENT
Start: 2025-01-28 | End: 2025-01-28

## 2025-01-28 RX ORDER — HYDROMORPHONE HYDROCHLORIDE 1 MG/ML
INJECTION, SOLUTION INTRAMUSCULAR; INTRAVENOUS; SUBCUTANEOUS AS NEEDED
Status: DISCONTINUED | OUTPATIENT
Start: 2025-01-28 | End: 2025-01-28

## 2025-01-28 RX ORDER — SODIUM CHLORIDE, SODIUM LACTATE, POTASSIUM CHLORIDE, CALCIUM CHLORIDE 600; 310; 30; 20 MG/100ML; MG/100ML; MG/100ML; MG/100ML
INJECTION, SOLUTION INTRAVENOUS CONTINUOUS PRN
Status: DISCONTINUED | OUTPATIENT
Start: 2025-01-28 | End: 2025-01-28

## 2025-01-28 RX ORDER — LIDOCAINE HYDROCHLORIDE AND EPINEPHRINE 10; 10 UG/ML; MG/ML
INJECTION, SOLUTION INFILTRATION; PERINEURAL AS NEEDED
Status: DISCONTINUED | OUTPATIENT
Start: 2025-01-28 | End: 2025-01-28 | Stop reason: HOSPADM

## 2025-01-28 RX ORDER — PHENYLEPHRINE HCL IN 0.9% NACL 0.4MG/10ML
SYRINGE (ML) INTRAVENOUS AS NEEDED
Status: DISCONTINUED | OUTPATIENT
Start: 2025-01-28 | End: 2025-01-28

## 2025-01-28 RX ORDER — AMPICILLIN AND SULBACTAM 2; 1 G/1; G/1
INJECTION, POWDER, FOR SOLUTION INTRAMUSCULAR; INTRAVENOUS AS NEEDED
Status: DISCONTINUED | OUTPATIENT
Start: 2025-01-28 | End: 2025-01-28

## 2025-01-28 RX ORDER — MIDAZOLAM HYDROCHLORIDE 1 MG/ML
INJECTION INTRAMUSCULAR; INTRAVENOUS AS NEEDED
Status: DISCONTINUED | OUTPATIENT
Start: 2025-01-28 | End: 2025-01-28

## 2025-01-28 RX ORDER — BACITRACIN 500 [USP'U]/G
OINTMENT TOPICAL AS NEEDED
Status: DISCONTINUED | OUTPATIENT
Start: 2025-01-28 | End: 2025-01-28 | Stop reason: HOSPADM

## 2025-01-28 RX ORDER — ROCURONIUM BROMIDE 10 MG/ML
INJECTION, SOLUTION INTRAVENOUS AS NEEDED
Status: DISCONTINUED | OUTPATIENT
Start: 2025-01-28 | End: 2025-01-28

## 2025-01-28 RX ORDER — REMIFENTANIL HYDROCHLORIDE 1 MG/ML
INJECTION, POWDER, LYOPHILIZED, FOR SOLUTION INTRAVENOUS AS NEEDED
Status: DISCONTINUED | OUTPATIENT
Start: 2025-01-28 | End: 2025-01-28

## 2025-01-28 RX ORDER — HYDROMORPHONE HYDROCHLORIDE 0.2 MG/ML
0.2 INJECTION INTRAMUSCULAR; INTRAVENOUS; SUBCUTANEOUS EVERY 5 MIN PRN
Status: DISCONTINUED | OUTPATIENT
Start: 2025-01-28 | End: 2025-01-29 | Stop reason: HOSPADM

## 2025-01-28 RX ORDER — ESMOLOL HYDROCHLORIDE 10 MG/ML
INJECTION INTRAVENOUS AS NEEDED
Status: DISCONTINUED | OUTPATIENT
Start: 2025-01-28 | End: 2025-01-28

## 2025-01-28 RX ORDER — PROPOFOL 10 MG/ML
INJECTION, EMULSION INTRAVENOUS AS NEEDED
Status: DISCONTINUED | OUTPATIENT
Start: 2025-01-28 | End: 2025-01-28

## 2025-01-28 RX ORDER — SODIUM CHLORIDE 0.9 G/100ML
INJECTION, SOLUTION IRRIGATION AS NEEDED
Status: DISCONTINUED | OUTPATIENT
Start: 2025-01-28 | End: 2025-01-28 | Stop reason: HOSPADM

## 2025-01-28 RX ORDER — LIDOCAINE HYDROCHLORIDE 10 MG/ML
0.1 INJECTION, SOLUTION INFILTRATION; PERINEURAL ONCE
Status: DISCONTINUED | OUTPATIENT
Start: 2025-01-28 | End: 2025-01-29 | Stop reason: HOSPADM

## 2025-01-28 RX ORDER — ONDANSETRON HYDROCHLORIDE 2 MG/ML
INJECTION, SOLUTION INTRAVENOUS AS NEEDED
Status: DISCONTINUED | OUTPATIENT
Start: 2025-01-28 | End: 2025-01-28

## 2025-01-28 RX ORDER — LIDOCAINE HYDROCHLORIDE 20 MG/ML
INJECTION, SOLUTION INFILTRATION; PERINEURAL AS NEEDED
Status: DISCONTINUED | OUTPATIENT
Start: 2025-01-28 | End: 2025-01-28 | Stop reason: HOSPADM

## 2025-01-28 RX ADMIN — REMIFENTANIL HYDROCHLORIDE 20 MCG: 1 INJECTION, POWDER, LYOPHILIZED, FOR SOLUTION INTRAVENOUS at 13:12

## 2025-01-28 RX ADMIN — REMIFENTANIL HYDROCHLORIDE 20 MCG: 1 INJECTION, POWDER, LYOPHILIZED, FOR SOLUTION INTRAVENOUS at 08:04

## 2025-01-28 RX ADMIN — AMPICILLIN SODIUM AND SULBACTAM SODIUM 3 G: 2; 1 INJECTION, POWDER, FOR SOLUTION INTRAMUSCULAR; INTRAVENOUS at 07:40

## 2025-01-28 RX ADMIN — ROCURONIUM BROMIDE 10 MG: 10 INJECTION INTRAVENOUS at 15:15

## 2025-01-28 RX ADMIN — REMIFENTANIL HYDROCHLORIDE 20 MCG: 1 INJECTION, POWDER, LYOPHILIZED, FOR SOLUTION INTRAVENOUS at 09:50

## 2025-01-28 RX ADMIN — REMIFENTANIL HYDROCHLORIDE 20 MCG: 1 INJECTION, POWDER, LYOPHILIZED, FOR SOLUTION INTRAVENOUS at 13:57

## 2025-01-28 RX ADMIN — Medication 120 MCG: at 19:33

## 2025-01-28 RX ADMIN — REMIFENTANIL HYDROCHLORIDE 40 MCG: 1 INJECTION, POWDER, LYOPHILIZED, FOR SOLUTION INTRAVENOUS at 15:15

## 2025-01-28 RX ADMIN — HYDROMORPHONE HYDROCHLORIDE 0.5 MG: 1 INJECTION, SOLUTION INTRAMUSCULAR; INTRAVENOUS; SUBCUTANEOUS at 17:26

## 2025-01-28 RX ADMIN — AMPICILLIN SODIUM AND SULBACTAM SODIUM 3 G: 2; 1 INJECTION, POWDER, FOR SOLUTION INTRAMUSCULAR; INTRAVENOUS at 11:40

## 2025-01-28 RX ADMIN — REMIFENTANIL HYDROCHLORIDE 40 MCG: 1 INJECTION, POWDER, LYOPHILIZED, FOR SOLUTION INTRAVENOUS at 17:47

## 2025-01-28 RX ADMIN — Medication 120 MCG: at 08:46

## 2025-01-28 RX ADMIN — ROCURONIUM BROMIDE 10 MG: 10 INJECTION INTRAVENOUS at 14:45

## 2025-01-28 RX ADMIN — Medication: at 21:58

## 2025-01-28 RX ADMIN — REMIFENTANIL HYDROCHLORIDE 40 MCG: 1 INJECTION, POWDER, LYOPHILIZED, FOR SOLUTION INTRAVENOUS at 14:43

## 2025-01-28 RX ADMIN — ROCURONIUM BROMIDE 10 MG: 10 INJECTION INTRAVENOUS at 16:54

## 2025-01-28 RX ADMIN — REMIFENTANIL HYDROCHLORIDE 20 MCG: 1 INJECTION, POWDER, LYOPHILIZED, FOR SOLUTION INTRAVENOUS at 14:45

## 2025-01-28 RX ADMIN — Medication 80 MCG: at 19:10

## 2025-01-28 RX ADMIN — MIDAZOLAM HYDROCHLORIDE 2 MG: 1 INJECTION, SOLUTION INTRAMUSCULAR; INTRAVENOUS at 07:25

## 2025-01-28 RX ADMIN — REMIFENTANIL HYDROCHLORIDE 20 MCG: 1 INJECTION, POWDER, LYOPHILIZED, FOR SOLUTION INTRAVENOUS at 10:33

## 2025-01-28 RX ADMIN — REMIFENTANIL HYDROCHLORIDE 22.44 MCG: 1 INJECTION, POWDER, LYOPHILIZED, FOR SOLUTION INTRAVENOUS at 16:37

## 2025-01-28 RX ADMIN — SODIUM CHLORIDE, POTASSIUM CHLORIDE, SODIUM LACTATE AND CALCIUM CHLORIDE 50 ML/HR: 600; 310; 30; 20 INJECTION, SOLUTION INTRAVENOUS at 21:28

## 2025-01-28 RX ADMIN — REMIFENTANIL HYDROCHLORIDE 20 MCG: 1 INJECTION, POWDER, LYOPHILIZED, FOR SOLUTION INTRAVENOUS at 09:42

## 2025-01-28 RX ADMIN — REMIFENTANIL HYDROCHLORIDE 20 MCG: 1 INJECTION, POWDER, LYOPHILIZED, FOR SOLUTION INTRAVENOUS at 13:23

## 2025-01-28 RX ADMIN — Medication 80 MCG: at 08:23

## 2025-01-28 RX ADMIN — HYDROMORPHONE HYDROCHLORIDE 0.5 MG: 0.5 INJECTION, SOLUTION INTRAMUSCULAR; INTRAVENOUS; SUBCUTANEOUS at 21:19

## 2025-01-28 RX ADMIN — SODIUM CHLORIDE, SODIUM LACTATE, POTASSIUM CHLORIDE, CALCIUM CHLORIDE: 600; 310; 30; 20 INJECTION, SOLUTION INTRAVENOUS at 08:10

## 2025-01-28 RX ADMIN — LIDOCAINE HYDROCHLORIDE 70 MG: 20 INJECTION INTRAVENOUS at 07:35

## 2025-01-28 RX ADMIN — ESMOLOL HYDROCHLORIDE 100 MG: 100 INJECTION, SOLUTION INTRAVENOUS at 07:37

## 2025-01-28 RX ADMIN — ALBUMIN HUMAN 125 ML: 0.05 INJECTION, SOLUTION INTRAVENOUS at 14:29

## 2025-01-28 RX ADMIN — ROCURONIUM BROMIDE 10 MG: 10 INJECTION INTRAVENOUS at 19:29

## 2025-01-28 RX ADMIN — REMIFENTANIL HYDROCHLORIDE 20 MCG: 1 INJECTION, POWDER, LYOPHILIZED, FOR SOLUTION INTRAVENOUS at 13:48

## 2025-01-28 RX ADMIN — Medication 80 MCG: at 08:10

## 2025-01-28 RX ADMIN — REMIFENTANIL HYDROCHLORIDE 20 MCG: 1 INJECTION, POWDER, LYOPHILIZED, FOR SOLUTION INTRAVENOUS at 10:43

## 2025-01-28 RX ADMIN — REMIFENTANIL HYDROCHLORIDE 20 MCG: 1 INJECTION, POWDER, LYOPHILIZED, FOR SOLUTION INTRAVENOUS at 12:43

## 2025-01-28 RX ADMIN — ONDANSETRON 4 MG: 2 INJECTION, SOLUTION INTRAMUSCULAR; INTRAVENOUS at 20:20

## 2025-01-28 RX ADMIN — HYDROMORPHONE HYDROCHLORIDE 0.5 MG: 0.5 INJECTION, SOLUTION INTRAMUSCULAR; INTRAVENOUS; SUBCUTANEOUS at 21:42

## 2025-01-28 RX ADMIN — SODIUM CHLORIDE, POTASSIUM CHLORIDE, SODIUM LACTATE AND CALCIUM CHLORIDE: 600; 310; 30; 20 INJECTION, SOLUTION INTRAVENOUS at 07:25

## 2025-01-28 RX ADMIN — ROCURONIUM BROMIDE 20 MG: 10 INJECTION INTRAVENOUS at 18:46

## 2025-01-28 RX ADMIN — AMPICILLIN SODIUM AND SULBACTAM SODIUM 3 G: 2; 1 INJECTION, POWDER, FOR SOLUTION INTRAMUSCULAR; INTRAVENOUS at 19:15

## 2025-01-28 RX ADMIN — ROCURONIUM BROMIDE 10 MG: 10 INJECTION INTRAVENOUS at 17:40

## 2025-01-28 RX ADMIN — REMIFENTANIL HYDROCHLORIDE 0.05 MCG/KG/MIN: 1 INJECTION, POWDER, LYOPHILIZED, FOR SOLUTION INTRAVENOUS at 08:49

## 2025-01-28 RX ADMIN — Medication 80 MCG: at 09:09

## 2025-01-28 RX ADMIN — ROCURONIUM BROMIDE 50 MG: 10 INJECTION INTRAVENOUS at 07:35

## 2025-01-28 RX ADMIN — Medication 40 PERCENT: at 21:01

## 2025-01-28 RX ADMIN — REMIFENTANIL HYDROCHLORIDE 40 MCG: 1 INJECTION, POWDER, LYOPHILIZED, FOR SOLUTION INTRAVENOUS at 18:31

## 2025-01-28 RX ADMIN — REMIFENTANIL HYDROCHLORIDE 20 MCG: 1 INJECTION, POWDER, LYOPHILIZED, FOR SOLUTION INTRAVENOUS at 10:40

## 2025-01-28 RX ADMIN — ROCURONIUM BROMIDE 10 MG: 10 INJECTION INTRAVENOUS at 18:16

## 2025-01-28 RX ADMIN — PHENYLEPHRINE-NACL IV SOLUTION 10 MG/250ML-0.9% 0.4 MCG/KG/MIN: 10-0.9/25 SOLUTION at 08:46

## 2025-01-28 RX ADMIN — Medication 120 MCG: at 08:35

## 2025-01-28 RX ADMIN — REMIFENTANIL HYDROCHLORIDE 20 MCG: 1 INJECTION, POWDER, LYOPHILIZED, FOR SOLUTION INTRAVENOUS at 10:54

## 2025-01-28 RX ADMIN — REMIFENTANIL HYDROCHLORIDE 20 MCG: 1 INJECTION, POWDER, LYOPHILIZED, FOR SOLUTION INTRAVENOUS at 13:28

## 2025-01-28 RX ADMIN — ALBUMIN HUMAN 250 ML: 0.05 INJECTION, SOLUTION INTRAVENOUS at 08:48

## 2025-01-28 RX ADMIN — REMIFENTANIL HYDROCHLORIDE 20 MCG: 1 INJECTION, POWDER, LYOPHILIZED, FOR SOLUTION INTRAVENOUS at 10:35

## 2025-01-28 RX ADMIN — DEXAMETHASONE SODIUM PHOSPHATE 10 MG: 4 INJECTION INTRA-ARTICULAR; INTRALESIONAL; INTRAMUSCULAR; INTRAVENOUS; SOFT TISSUE at 07:40

## 2025-01-28 RX ADMIN — ROCURONIUM BROMIDE 10 MG: 10 INJECTION INTRAVENOUS at 17:23

## 2025-01-28 RX ADMIN — REMIFENTANIL HYDROCHLORIDE 20 MCG: 1 INJECTION, POWDER, LYOPHILIZED, FOR SOLUTION INTRAVENOUS at 08:05

## 2025-01-28 RX ADMIN — PROPOFOL 50 MG: 10 INJECTION, EMULSION INTRAVENOUS at 07:37

## 2025-01-28 RX ADMIN — Medication 80 MCG: at 20:15

## 2025-01-28 RX ADMIN — HYDROMORPHONE HYDROCHLORIDE 0.5 MG: 1 INJECTION, SOLUTION INTRAMUSCULAR; INTRAVENOUS; SUBCUTANEOUS at 17:40

## 2025-01-28 RX ADMIN — HYDROMORPHONE HYDROCHLORIDE 0.6 MG: 1 INJECTION, SOLUTION INTRAMUSCULAR; INTRAVENOUS; SUBCUTANEOUS at 20:57

## 2025-01-28 RX ADMIN — REMIFENTANIL HYDROCHLORIDE 20 MCG: 1 INJECTION, POWDER, LYOPHILIZED, FOR SOLUTION INTRAVENOUS at 15:54

## 2025-01-28 RX ADMIN — HYDROMORPHONE HYDROCHLORIDE 0.5 MG: 0.5 INJECTION, SOLUTION INTRAMUSCULAR; INTRAVENOUS; SUBCUTANEOUS at 21:28

## 2025-01-28 RX ADMIN — REMIFENTANIL HYDROCHLORIDE 20 MCG: 1 INJECTION, POWDER, LYOPHILIZED, FOR SOLUTION INTRAVENOUS at 11:21

## 2025-01-28 RX ADMIN — Medication 80 MCG: at 08:30

## 2025-01-28 RX ADMIN — ALBUMIN HUMAN 125 ML: 0.05 INJECTION, SOLUTION INTRAVENOUS at 09:52

## 2025-01-28 RX ADMIN — REMIFENTANIL HYDROCHLORIDE 60 MCG: 1 INJECTION, POWDER, LYOPHILIZED, FOR SOLUTION INTRAVENOUS at 18:52

## 2025-01-28 RX ADMIN — HYDROMORPHONE HYDROCHLORIDE 0.4 MG: 1 INJECTION, SOLUTION INTRAMUSCULAR; INTRAVENOUS; SUBCUTANEOUS at 20:32

## 2025-01-28 RX ADMIN — Medication 80 MCG: at 19:13

## 2025-01-28 RX ADMIN — REMIFENTANIL HYDROCHLORIDE 20 MCG: 1 INJECTION, POWDER, LYOPHILIZED, FOR SOLUTION INTRAVENOUS at 13:16

## 2025-01-28 RX ADMIN — HYDROMORPHONE HYDROCHLORIDE 0.5 MG: 0.5 INJECTION, SOLUTION INTRAMUSCULAR; INTRAVENOUS; SUBCUTANEOUS at 23:33

## 2025-01-28 RX ADMIN — ROCURONIUM BROMIDE 10 MG: 10 INJECTION INTRAVENOUS at 14:00

## 2025-01-28 RX ADMIN — SUGAMMADEX 200 MG: 100 INJECTION, SOLUTION INTRAVENOUS at 20:40

## 2025-01-28 RX ADMIN — REMIFENTANIL HYDROCHLORIDE 20 MCG: 1 INJECTION, POWDER, LYOPHILIZED, FOR SOLUTION INTRAVENOUS at 12:48

## 2025-01-28 RX ADMIN — REMIFENTANIL HYDROCHLORIDE 20 MCG: 1 INJECTION, POWDER, LYOPHILIZED, FOR SOLUTION INTRAVENOUS at 11:46

## 2025-01-28 RX ADMIN — PROPOFOL 150 MG: 10 INJECTION, EMULSION INTRAVENOUS at 07:35

## 2025-01-28 RX ADMIN — REMIFENTANIL HYDROCHLORIDE 20 MCG: 1 INJECTION, POWDER, LYOPHILIZED, FOR SOLUTION INTRAVENOUS at 12:25

## 2025-01-28 RX ADMIN — REMIFENTANIL HYDROCHLORIDE 20 MCG: 1 INJECTION, POWDER, LYOPHILIZED, FOR SOLUTION INTRAVENOUS at 17:25

## 2025-01-28 RX ADMIN — Medication 80 MCG: at 08:00

## 2025-01-28 RX ADMIN — REMIFENTANIL HYDROCHLORIDE 20 MCG: 1 INJECTION, POWDER, LYOPHILIZED, FOR SOLUTION INTRAVENOUS at 10:51

## 2025-01-28 RX ADMIN — REMIFENTANIL HYDROCHLORIDE 20 MCG: 1 INJECTION, POWDER, LYOPHILIZED, FOR SOLUTION INTRAVENOUS at 09:29

## 2025-01-28 RX ADMIN — REMIFENTANIL HYDROCHLORIDE 20 MCG: 1 INJECTION, POWDER, LYOPHILIZED, FOR SOLUTION INTRAVENOUS at 11:26

## 2025-01-28 RX ADMIN — ALBUMIN HUMAN 125 ML: 0.05 INJECTION, SOLUTION INTRAVENOUS at 16:03

## 2025-01-28 RX ADMIN — REMIFENTANIL HYDROCHLORIDE 20 MCG: 1 INJECTION, POWDER, LYOPHILIZED, FOR SOLUTION INTRAVENOUS at 14:35

## 2025-01-28 RX ADMIN — ALBUMIN HUMAN 125 ML: 0.05 INJECTION, SOLUTION INTRAVENOUS at 09:18

## 2025-01-28 RX ADMIN — ROCURONIUM BROMIDE 20 MG: 10 INJECTION INTRAVENOUS at 16:00

## 2025-01-28 RX ADMIN — REMIFENTANIL HYDROCHLORIDE 20 MCG: 1 INJECTION, POWDER, LYOPHILIZED, FOR SOLUTION INTRAVENOUS at 08:42

## 2025-01-28 RX ADMIN — REMIFENTANIL HYDROCHLORIDE 40 MCG: 1 INJECTION, POWDER, LYOPHILIZED, FOR SOLUTION INTRAVENOUS at 12:11

## 2025-01-28 RX ADMIN — ROCURONIUM BROMIDE 40 MG: 10 INJECTION INTRAVENOUS at 13:00

## 2025-01-28 ASSESSMENT — PAIN DESCRIPTION - LOCATION
LOCATION: INCISION

## 2025-01-28 ASSESSMENT — PAIN SCALES - GENERAL
PAINLEVEL_OUTOF10: 10 - WORST POSSIBLE PAIN
PAINLEVEL_OUTOF10: 7
PAINLEVEL_OUTOF10: 0 - NO PAIN
PAINLEVEL_OUTOF10: 10 - WORST POSSIBLE PAIN
PAINLEVEL_OUTOF10: 7
PAINLEVEL_OUTOF10: 10 - WORST POSSIBLE PAIN
PAINLEVEL_OUTOF10: 10 - WORST POSSIBLE PAIN
PAIN_LEVEL: 3

## 2025-01-28 ASSESSMENT — COLUMBIA-SUICIDE SEVERITY RATING SCALE - C-SSRS
2. HAVE YOU ACTUALLY HAD ANY THOUGHTS OF KILLING YOURSELF?: NO
6. HAVE YOU EVER DONE ANYTHING, STARTED TO DO ANYTHING, OR PREPARED TO DO ANYTHING TO END YOUR LIFE?: NO

## 2025-01-28 ASSESSMENT — PAIN - FUNCTIONAL ASSESSMENT
PAIN_FUNCTIONAL_ASSESSMENT: 0-10
PAIN_FUNCTIONAL_ASSESSMENT: 0-10

## 2025-01-28 NOTE — ANESTHESIA PREPROCEDURE EVALUATION
Patient: Sim Guaman    Procedure Information       Date/Time: 01/28/25 0645    Procedures:       Upper Extremity Free Flap - right-vs-left radial forearm free flap reconstruction      SURGICAL PROCUREMENT, GRAFT, SKIN, SPLIT-THICKNESS, HEAD AND NECK REGION - right-vs-left thigh skin graft      DISSECTION, NECK (Bilateral) - bilateral neck dissection      PHARYNGECTOMY - pharyngectomy      RECONSTRUCTION, MANDIBLE - mandibulotomy    Location: [facility] OR 03 / [facility] OR    Surgeons: Vivienne Mclaughlin MD; Susie Ferguson MD            Relevant Problems   Anesthesia (within normal limits)      HEENT  Oral cavity cancer.       Clinical information reviewed:   Tobacco  Allergies  Meds   Med Hx  Surg Hx   Fam Hx  Soc Hx        NPO Detail:  NPO/Void Status  Date of Last Liquid: 01/28/25  Time of Last Liquid: 0500  Date of Last Solid: 01/27/25  Time of Last Solid: 2000  Last Intake Type: Clear fluids         Physical Exam    Airway  Mallampati: III     Cardiovascular    Dental    Pulmonary    Abdominal            Anesthesia Plan    History of general anesthesia?: yes  History of complications of general anesthesia?: no    ASA 3     general     intravenous induction   Anesthetic plan and risks discussed with patient.    Plan discussed with CRNA.

## 2025-01-28 NOTE — OP NOTE
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]     Procedures  Direct laryngoscopy  Tracheostomy  Wide excision right soft palatal lesion with partial pharyngectomy  Bilateral selective neck dissection levels 1-4  Transmandibular approach to the skull base with resection of right retropharyngeal/skull base lymph node  Excision of right external jugular lymph node  Right parasymphyseal mandibulotomy with placement reconstruction plate  Dental extraction tooth #27    Surgeons   Panel 1:     * Vivienne Mclaughlin - Primary  Panel 2:     * Susie Ferguson - Primary  Panel 3:     * Mani Barnard - Primary    Resident/Fellow/Other Assistant:  Surgeons and Role:  Panel 1:     * J Carlos Jang DO - Resident - Assisting     * Dick Miguel MD - Fellow    Staff:   Circulator: Laura  Circulator: Berta  Scrub Person: Crystal Garciaub Person: Wesly Patrick Scrub: Laura    Anesthesia Staff: Anesthesiologist: Faina Marte MD  CRNA: SHEY Shin-BEBO  C-AA: TRESSA Pulido    Procedure Summary  Anesthesia: General  ASA: III  Estimated Blood Loss: 60mL  Intra-op Medications:   Administrations occurring from 0645 to 1820 on 25:   Medication Name Total Dose   sodium chloride 0.9 % irrigation solution 2,000 mL   lidocaine-epinephrine (Xylocaine W/EPI) 1 %-1:100,000 injection 10 mL   gelatin absorbable (Gelfoam) 100 sponge 1 each   lidocaine (Xylocaine) 20 mg/mL (2 %) injection 50 mL   heparin 25,000 Units in sodium chloride 0.9 % 250 mL irrigation 250 mL   albumin human 5 % 500 mL   ampicillin-sulbactam (Unasyn) vial 3 g 6 g   dexAMETHasone (Decadron) injection 4 mg/mL 10 mg    esmolol (Brevibloc) injection 100 mg   LR infusion Cannot be calculated   LR infusion Cannot be calculated   lidocaine (cardiac) injection 2% prefilled syringe 70 mg   midazolam PF (Versed) injection 1 mg/mL 2 mg   phenylephrine (Jose Francisco-Synephrine) 10 mg in sodium chloride 0.9% 250 mL (0.04 mg/mL) infusion (premix) 1.46 mg   phenylephrine 40 mcg/mL syringe 10 mL 640 mcg   propofol (Diprivan) injection 10 mg/mL 200 mg   remifentanil (Ultiva) 1,000 mcg in sodium chloride 0.9% 50 mL (20 mcg/mL) infusion 3.27 mg   remifentanil (Ultiva) injection 480 mcg   rocuronium (ZeMuron) 50 mg/5 mL injection 90 mg              Anesthesia Record               Intraprocedure I/O Totals          Intake    Remifentanil Drip 0.00 mL    The total shown is the total volume documented since Anesthesia Start was filed.    Phenylephrine Drip 0.00 mL    The total shown is the total volume documented since Anesthesia Start was filed.    Total Intake 0 mL       Output    Urine 1250 mL    Est. Blood Loss 150 mL    Total Output 1400 mL       Net    Net Volume -1400 mL          Specimen:   ID Type Source Tests Collected by Time   1 : External jugular lymph node Tissue LYMPH NODE ENT (SPECIFY SITE) SURGICAL PATHOLOGY EXAM Susie Ferguson MD 1/28/2025 0919   2 : Right neck lymph node packet Level 1 Tissue LYMPH NODE ENT (SPECIFY SITE) SURGICAL PATHOLOGY EXAM Susie Ferguson MD 1/28/2025 1015   3 : Right neck lymph node packet Level 2 Tissue LYMPH NODE ENT (SPECIFY SITE) SURGICAL PATHOLOGY EXAM Susie Ferguson MD 1/28/2025 1017   4 : Right neck lymph node packet Level 3 Tissue LYMPH NODE ENT (SPECIFY SITE) SURGICAL PATHOLOGY EXAM Susie Ferguson MD 1/28/2025 1017   5 : Right neck lymph node packet Level 4 Tissue LYMPH NODE ENT (SPECIFY SITE) SURGICAL PATHOLOGY EXAM Susie Ferguson MD 1/28/2025 1017   6 : Bilateral oropharyngectomy stich marks right inferior right tonsil Tissue SOFT TISSUE RESECTION SURGICAL PATHOLOGY EXAM Susie  KATHRYN Ferguson MD 1/28/2025 1107   7 : Retro pharangeal mass a skull base. Tissue SOFT TISSUE RESECTION SURGICAL PATHOLOGY EXAM Susie Ferguson MD 1/28/2025 1111   8 : Right pharangeal margin Tissue SOFT TISSUE RESECTION SURGICAL PATHOLOGY EXAM Susie Ferguson MD 1/28/2025 1136   9 : Right floor of mouth margin Tissue SOFT TISSUE RESECTION SURGICAL PATHOLOGY EXAM Susie Ferguson MD 1/28/2025 1137   10 : Left glossal tonsil margin Tissue SOFT TISSUE RESECTION SURGICAL PATHOLOGY EXAM Susie Ferguson MD 1/28/2025 1138   11 : Left neck level 1 Tissue NECK DISSECTION LEFT (SPECIFY LEVELS) SURGICAL PATHOLOGY EXAM Susie Ferguson MD 1/28/2025 1241   12 : Left neck level 2 Tissue NECK DISSECTION LEFT (SPECIFY LEVELS) SURGICAL PATHOLOGY EXAM Susie Ferguson MD 1/28/2025 1241   13 : Left neck level 3 Tissue NECK DISSECTION LEFT (SPECIFY LEVELS) SURGICAL PATHOLOGY EXAM Susie Ferguson MD 1/28/2025 1241   14 : Left neck level 4 Tissue NECK DISSECTION LEFT (SPECIFY LEVELS) SURGICAL PATHOLOGY EXAM Susie Ferguson MD 1/28/2025 1242                 Drains and/or Catheters:   Gastrostomy/Enterostomy PEG-jejunostomy 1 20 Fr. LUQ (Active)       Urethral Catheter Straight-tip;Non-latex 16 Fr. (Active)       Tourniquet Times:     Total Tourniquet Time Documented:  Arm - Upper (Left) - 80 minutes  Total: Arm - Upper (Left) - 80 minutes      Implants:  Implants       Type Name Action Serial No.       PEG KIT, SAFETY, PULL, 20FR W/ENFIT - [id number] Implanted      Screw PLATE, TEMPLATE, FOR 20-HOLE STR PLATES 2.0 / 2.5 / 3.0 MM - [id number] Used, Not Implanted      Screw PLATE, LOCK 2.0 20H STRAIGHT - [id number] Implanted      Screw SCREW, LOCK 2.0 X 11 MAXDRV TI ALLOY - [id number] Implanted      Screw SCREW, LOCK 2.0 X 13 MAXDRV TI ALLOY - [id number] Implanted      Screw SCREW, LOCK 2.0 X 19 MAXDRV TI ALLOY - [id number] Wasted               Findings:   -Firm, ulcerative mass originating on the right soft palate  extending to involve the entirety of the soft palate, the superior posterior tonsillar pillar on the left, the right glossotonsillar sulcus sparing the base of tongue, epiglottis, vallecula and hard palate  -Right parasymphyseal mandibulotomy with 2.0 mm reconstruction plate conformed to shape of right mandible  -Wide excision of tumor with resultant approximately 9 cm x 8 cm oropharyngeal defect of bilateral soft palate, posterior pharynx, hard palate, tonsillar fossae and portion of right floor of mouth with negative frozen margins for squamous cell carcinoma, with deepest extension to prevertebral fascia on right oropharynx and superior pharyngeal constrictor on left  -Reinforced endotracheal tube placed through newly created tracheostomy and sutured to skin  -Right external jugular lymph node excised, negative for carcinoma on frozen specimen  -Bilateral neck dissection levels 1-4 with bulky, firm, adherent lymphadenopathy at levels 2a bilaterally  -Right retropharyngeal lymph node dissection with firm, smooth, ovoid lymph node excised from prevertebral fascia deep to this  -Right external jugular vein, right facial artery and left facial artery identified, traced and ligated for reconstructive team    Indications: Sim Guaman is an 44 y.o. 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 Given the above, recommendation was made for wide surgical excision, bilateral neck dissection and reconstruction with free tissue transfer from left arm with left thigh split thickness skin graft.    The patient was seen in the preoperative area. The risks, benefits, complications, treatment options, non-operative alternatives, expected recovery and outcomes  were discussed with the patient. The possibilities of reaction to medication, pulmonary aspiration, injury to surrounding structures, bleeding, recurrent infection, the need for additional procedures, failure to diagnose a condition, and creating a complication requiring transfusion or operation were discussed with the patient. The patient concurred with the proposed plan, giving informed consent.  The site of surgery was properly noted/marked if necessary per policy. The patient has been actively warmed in preoperative area. Preoperative antibiotics have been ordered and given within 1 hours of incision. Venous thrombosis prophylaxis have been ordered including bilateral sequential compression devices    Procedure Details: After identification in the preoperative holding area the patient was taken to the operating room and transferred to the operating table in supine position. After this anesthesia was induced without difficulty and a proper timeout was performed. The operating table was then rotated 90 degrees and the patient was appropriately positioned.    The procedure began with placement of percutaneous gastric tube with esophagoscopy by Dr. Barnard. During this portion, no esophageal masses or lesions were noted. After gastric tube was successfully placed, care was turned to the ablative surgical team.    Mouth guard was placed, and the dedo laryngoscope was then introduced and the oropharynx, hypopharynx, soft palate, base of tongue, epiglottis, and larynx were all visualized and found to be free of lesions except for the patient's right soft palatal lesion as detailed in the operative findings. The dedo and mouth guard were then removed from patient's mouth.    Now we prepared for the sterile ablative and reconstructive procedure. A shoulder roll was placed. The anterior neck landmarks were palpated and marked. The planned full-apron incision extending from mastoid tips to a natural skin crease between the  hyoid and thyroid cartilage with contiguous, staggered lip-split incision as well as the separate planned horizontal tracheostomy incision were infiltrated with 1% lidocaine with 1:100,000 units of epinephrine which was allowed time to take effect. The left arm and left thigh were then prepped and draped in the sterile fashion. The anterior neck and lower face were then prepped and draped in standard sterile fashion and a #15 blade was used to incise the planned tracheostomy incision. A pad of fatty tissue was resected down to the level of the strap muscles. Dissection then proceeded to the midline raphe which was divided. The strap muscles were retracted laterally on each side and a combination of blunt dissection and cautery were used to expose the anterior surface of the trachea. The thyroid isthmus was superior to our planned tracheostomy site and avoided. The anterior tracheal fascia was bluntly dissected off the tracheal surface to allow visualization of the tracheal rings. Anesthesia then advanced their ET tube to prevent violation of the cuff and a #15 blade was used to make an incision just below the second tracheal ring. Curved thompson scissors were then used to cut through the ring on each side. A 3-0 vicryl stitch was placed in a half-horizontal mattress fashion to create the Kavon flap and secure this to the fibrous lower midline raphe. The endotracheal tube was then carefully withdrawn under direct visualization and once superior to the window, a new reinforced endotracheal tube was easily placed. The proximal end of the tube was passed off the field to the anesthesia team. The ventilator circuit was then hooked up and end-tidal CO2 was confirmed. The tube was then secured with 2-0 silk suture placed in figure-of-eight fashion to skin. We now turned our attention to the right neck dissection.    The planned anterior neck incision was opened with a 15 blade down to the level of the platysma from the right  mastoid tip to just past the midline. Bovie electrocautery was then used to incise the platysma. Subplatysmal flaps were raised superiorly to the angle and body of the mandible as well as the symphysis of the mandible in the midline. Subplatysmal flaps were raised inferiorly to just above the clavicle. At the lateral border of the right platysma, an ovoid, subcentimeter external jugular lymph node was identified, transected from surrounding tissue and sent for frozen pathology, noted to be negative for squamous cell carcinoma.     At this time, the marginal mandibular nerve was identified and protected. The inferior border of the submandibular gland was identified. We incised the fascia over the submandibular gland inferiorly and the posterior belly of the digastric was identified coursing towards the mastoid tip. Careful dissection was then performed to release all fibrofatty tissue extending from the body of the mandible and between the anterior and posterior belly of the digastric, mylohyoid muscle and off the submandibular gland. The hypoglossal nerve was identified deep to the posterior belly of the digastric and was preserved. The lingual nerve was identified deep to the mylohyoid and was preserved as well. The ganglion and submandibular duct were ligated. We then proceeded with the level 1A dissection and used bovie to identify the right anterior belly of the digastric. We then dissected all fibrofatty tissue between the anterior belly of the digastric muscles down to the level of the hyoid and removed this specimen with right level 1b en-bloc for permanent pathology.     Then, we began to unroll the fibrofatty tissue off the anterior border of the SCM. The omohyoid muscle was identified inferiorly during this dissection. We identified cranial nerve 11 entering the SCM at the junction of the upper 1/3rd and lower 2/3rd of the muscle. We turned attention to the posterior belly of the digastric which had been  identified previously. The internal jugular vein was identified deep to the posterior belly of the digastric. We then skeletonized the 11th nerve and followed it and noted it was coursing superficial to the IJV. We performed blunt dissection between the 11th nerve and IJV to identify the floor of the neck superiorly at level 2. The fibrofatty tissue was incised along the IJV down to the floor and pulled inferiorly. We reflected the tissue both posterior and anterior to CN 11 completing dissection of levels 2a/b.     Next we turned our attention more laterally and continued our dissection along the SCM to identify the cervical rootlets down past level of the omohyoid which marked the floor of our dissection. We incised the fascia over the rootlets and continued our dissection anteriorly until the IJV was clearly visible. We also ensured that the inferior extent of our dissection past the omohyoid was incised to reveal the inferior aspect of the IJV. We then used the 15 blade to carefully dissect the fibrofatty tissue off the IJV. Small vessels were cauterized with bipolar electrocautery. Within level 4 we identified and preserved the transverse cervical vessels. Small vessels were cauterized with bipolar electrocautery. Dissection continued in this fashion until the common facial vein was identified. We used a combination of blunt dissection and bovie to separate the fibrofatty pack from the common facial vein, omohyoid muscle and strap muscles. The specimen was then removed and submitted for permanent pathology as levels 2, 3 and 4 separately. We then paid our attention to the excision of the patient's tumor.    The planned lip-split incision was incised with #15 blade taken down to subcutaneous tissue. Bovie electrocautery was then used to extend this incision past the periosteum in the lower midline of the mandible. Following the plane of our subplatysmal flap, bovie electrocautery was used to elevate our flap  superiorly to the level of the bony mandible such that inferior bilateral parasymphyses and inferior right mandibular body were visible. Next, we used blunt dissection with #9 elevator to free periosteum off bone to the level of the lower gingiva, taking care not to disturb oral mucosa. Mental neurovascular bundle was identified and preserved. Now, the 2.0 mm reconstruction bar was brought into the surgical field and sized to the patient's remnant mandible using the reconstruction bar template. Once we determined the appropriate location for placement, we used 2.0mm non-locking screws to fix our reconstruction plate to the right mandibular parasymphysis and body. All screw sizes and corresponding reconstruction bar screw-holes were recorded. Screws and reconstruction bar were removed. Tooth #27 was noted to be adjacent to our planned mandibulotomy site. Periodontal ligament was freed off this tooth, which was subsequently grasped and removed with dental root intact and en-bloc with tooth #27. Oral mucosa was incised along our planned mandibulotomy site, and extending across the mesial border of the lower mandibular gingiva, with further periosteum freed from said mandibulotomy site. Now, we used an oscillating bone saw to create a mandibulotomy in a staggered fashion just medial to the right mental foramen, traveling through the old tooth #27 socket.    Once we completed our mandibulotomy, we continued with wide excision of the tumor, removing gross tumor with adjacent 2 cm margins of grossly uninvolved tissue. Resected tissue included the entirety of the soft palate, bilateral tonsils, right superior pharyngeal constrictor muscles, left pharyngobasilar fascia, bilateral posterior oropharyngeal walls, right tonsillar pillars and left tonsillar pillar. This resulted in a defect as described in the operative findings. The tumor was tagged with marking suture and sent for frozen pathology. Additional frozen specimens  were taken from right posterior oropharyngeal wall, right floor of mouth and left glossotonsillar sulcus. All margins were noted to be negative for carcinoma.     Now we paid attention to the right retropharyngeal lymph node visualized after excision of the above specimens. This was grasped and dissected away from the underlying prevertebral fascia to the level of the skull base superiorly, taking care not to disturb adjacent neurovascular structures emanating from the right jugular foramen. This was removed and sent en-bloc for permanent pathology.    At this time, we began the contralateral neck dissection. In a similar fashion to the right side, our planned anterior neck incision was further opened with a 15 blade down to the level of the platysma from the existing incision line next to midline to the left mastoid tip. Subplatysmal flaps were raised superiorly to the angle and body of the mandible as well as the symphysis of the mandible in the midline. Subplatysmal flaps were raised inferiorly to just above the clavicle.    At this time, the marginal mandibular nerve was identified and protected. The inferior border of the submandibular gland was identified. We incised the fascia over the submandibular gland inferiorly and the posterior belly of the digastric was identified coursing towards the mastoid tip. Careful dissection was then performed to release all fibrofatty tissue extending from the body of the mandible and between the anterior and posterior belly of the digastric, mylohyoid muscle and off the submandibular gland. The hypoglossal nerve was identified deep to the posterior belly of the digastric and was preserved. The lingual nerve was identified deep to the mylohyoid and was preserved as well. The ganglion and submandibular duct were ligated. We then removed this specimen as left level 1b en-bloc for permanent pathology.    We then began to unroll the fibrofatty tissue off the anterior border of the  SCM, and the same procedure performed on the right for levels 2a/b, 3 and 4 was performed on this left side in the same manner, again with operative findings as noted above.    After completion of bilateral neck dissection, attention was turned to vessel preparation for free flap reconstruction. The bilateral facial arteries were skeletonized. The right external jugular vein was identified as well. These were dissected free from surrounding soft tissue and traced for the reconstructive team.       Complications:  None; patient tolerated the procedure well.    Disposition: PACU - hemodynamically stable.  Condition: stable     J Carlos Jang DO, PGY4    Attending Attestation: I was present and scrubbed for the entire procedure.    Susie Ferguson MD

## 2025-01-28 NOTE — ANESTHESIA PROCEDURE NOTES
Peripheral IV  Date/Time: 1/28/2025 8:10 AM      Placement  Needle size: 18 G  Laterality: right  Location: forearm  Site prep: alcohol  Technique: anatomical landmarks

## 2025-01-28 NOTE — OP NOTE
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]     Procedures    EGD/PEG Tube placement    Surgeons   Panel 1:     * Vivienne Mclaughlin - Primary  Panel 2:     * Susie Ferguson - Primary  Panel 3:     * Mani Barnard - Primary    Resident/Fellow/Other Assistant:  Surgeons and Role:  Panel 1:     * J Carlos Jang DO - Resident - Assisting     * Dick Miguel MD - Fellow    Staff:   Circulator: Laura  Circulator: Berta Garciaub Person: Crystal Vazquez Person: Wesly    Anesthesia Staff: Anesthesiologist: Faina Marte MD  CRNA: SHEY Shin-CRNA    Procedure Summary  Anesthesia: General  ASA: III  Estimated Blood Loss: 1mL  Intra-op Medications:   Administrations occurring from 0645 to 1820 on 25:   Medication Name Total Dose   sodium chloride 0.9 % irrigation solution 2,000 mL   lidocaine-epinephrine (Xylocaine W/EPI) 1 %-1:100,000 injection 10 mL   lidocaine (Xylocaine) 20 mg/mL (2 %) injection 50 mL   heparin 25,000 Units in sodium chloride 0.9 % 250 mL irrigation 250 mL   albumin human 5 % 500 mL   ampicillin-sulbactam (Unasyn) vial 3 g 3 g   dexAMETHasone (Decadron) injection 4 mg/mL 10 mg   esmolol (Brevibloc) injection 100 mg   LR infusion Cannot be calculated   LR infusion Cannot be calculated   lidocaine (cardiac) injection 2% prefilled syringe 70 mg   midazolam PF (Versed) injection 1 mg/mL 2 mg   phenylephrine (Jose Francisco-Synephrine) 10 mg in sodium chloride 0.9% 250 mL (0.04 mg/mL) infusion (premix) 1.46 mg   phenylephrine 40 mcg/mL syringe 10 mL 640 mcg   propofol (Diprivan) injection 10 mg/mL 200 mg   remifentanil (Ultiva) 1,000 mcg in sodium chloride 0.9%  50 mL (20 mcg/mL) infusion 4.66 mg   remifentanil (Ultiva) injection 240 mcg   rocuronium (ZeMuron) 50 mg/5 mL injection 50 mg              Anesthesia Record               Intraprocedure I/O Totals          Intake    Remifentanil Drip 0.00 mL    The total shown is the total volume documented since Anesthesia Start was filed.    Phenylephrine Drip 0.00 mL    The total shown is the total volume documented since Anesthesia Start was filed.    Total Intake 0 mL       Output    Urine 800 mL    Total Output 800 mL       Net    Net Volume -800 mL          Specimen:   ID Type Source Tests Collected by Time   1 : External jugular lymph node Tissue LYMPH NODE ENT (SPECIFY SITE) SURGICAL PATHOLOGY EXAM Susie Ferguson MD 2025 0919   2 : Right neck lymph node packet Level 1 Tissue LYMPH NODE ENT (SPECIFY SITE) SURGICAL PATHOLOGY EXAM Susie Ferguson MD 2025 1015   3 : Right neck lymph node packet Level 2 Tissue LYMPH NODE ENT (SPECIFY SITE) SURGICAL PATHOLOGY EXAM Susie Ferguson MD 2025 1017   4 : Right neck lymph node packet Level 3 Tissue LYMPH NODE ENT (SPECIFY SITE) SURGICAL PATHOLOGY EXAM Susie Ferguson MD 2025 1017   5 : Right neck lymph node packet Level 4 Tissue LYMPH NODE ENT (SPECIFY SITE) SURGICAL PATHOLOGY EXAM Susie Ferguson MD 2025 1017                 Drains and/or Catheters:   Gastrostomy/Enterostomy PEG-jejunostomy 1 20 Fr. LUQ (Active)       Urethral Catheter Straight-tip;Non-latex 16 Fr. (Active)       Tourniquet Times:   * Missing tourniquet times found for documented tourniquets in lo *     Implants:  Implants       Type Name Action Serial No.       PEG KIT, SAFETY, PULL, 20FR W/ENFIT - [id number] Implanted               Findings: Normal placement of PEG tube at 2.5 cm from the skin.    Indications: Sim Guaman is an 44 y.o. 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  This is a patient of Dr. Niraj Ferguson of the  ENT department.  Patient is having a radical resection of a soft tissue tumor of the soft palate.  He will require long-term enteral nutrition and we were asked by the ENT team to place a PEG tube for him today.    Risk-benefit of this procedure were discussed with the patient.  He has had no prior abdominal surgery.    The patient was seen in the preoperative area. The risks, benefits, complications, treatment options, non-operative alternatives, expected recovery and outcomes were discussed with the patient. The possibilities of reaction to medication, pulmonary aspiration, injury to surrounding structures, bleeding, recurrent infection, the need for additional procedures, failure to diagnose a condition, and creating a complication requiring transfusion or operation were discussed with the patient. The patient concurred with the proposed plan, giving informed consent.  The site of surgery was properly noted/marked if necessary per policy. The patient has been actively warmed in preoperative area. Preoperative antibiotics have been ordered and given within 1 hours of incision. Venous thrombosis prophylaxis have been ordered including bilateral sequential compression devices    Procedure Details: On the operative date patient was already in the operating room when I arrived.  He was asleep under general anesthetic.  He had already received antibiotics.  We inserted an adult video endoscope down through the mass passed the tumor into the esophagus without difficulty.  Esophageal mucosal appeared normal scope was advanced into the stomach.  Normal rugal folds.  No masses seen.  We had a good area of external digital compression that corresponded with a light reflex.      I prepped out over the upper abdomen.  1% lidocaine was used in the skin.  I then advanced the local anesthetic needle down into the stomach.  Upon entry into the stomach and visualization of the needle we had immediate return of air bubbles into the  needle chamber demonstrating a safe track technique.  I then made a 1 cm incision and advanced an Angiocath into the stomach.  Guidewire was placed and grasped with a snare through the scope and the whole assembly pulled out through the mouth.  20 Turkmen PEG tube was attached and pulled back down to a retrograde fashion.    PEG tube seated well at about 2.5 cm from the skin.  Stomach was desufflated scope removed no other abnormalities were noted other than the soft tissue tumor in the palate.    PEG tube was secured with a T-fastener bumper at 2.5 cm from the skin and placed to gravity drainage.    The ENT team will now proceed on with their resection and reconstruction the patient's primary tumor.  Complications:  None; patient tolerated the procedure well.    Disposition:  Patient will remain in the operating room.  Condition: stable                 Additional Details:     Attending Attestation: I was present and scrubbed for the entire procedure.    Vivienne Mclaughlin  Phone Number: [phone]

## 2025-01-28 NOTE — ANESTHESIA PROCEDURE NOTES
Airway  Date/Time: 1/28/2025 7:38 AM  Urgency: elective    Airway not difficult    Staffing  Performed: CRNA   Authorized by: Faina Marte MD    Performed by: SHEY Shin-BEBO  Patient location during procedure: OR    Indications and Patient Condition  Indications for airway management: anesthesia  Spontaneous Ventilation: absent  Sedation level: deep  Preoxygenated: yes  Patient position: sniffing  Mask difficulty assessment: 1 - vent by mask  Planned trial extubation    Final Airway Details  Final airway type: endotracheal airway      Successful airway: ETT  Cuffed: yes   Successful intubation technique: video laryngoscopy  Facilitating devices/methods: intubating stylet  Blade: Steve  ETT size (mm): 6.5  Cormack-Lehane Classification: grade I - full view of glottis  Placement verified by: chest auscultation and capnometry   Measured from: teeth  ETT to teeth (cm): 22  Number of attempts at approach: 1    Additional Comments  Lips and teeth in pre-anesthetic condition.

## 2025-01-28 NOTE — ANESTHESIA PROCEDURE NOTES
Arterial Line:    Date/Time: 1/28/2025 7:55 AM    Staffing  Performed: CRNA   Authorized by: Faina Marte MD    Performed by: SHEY Shin-CRNA    An arterial line was placed. Procedure performed using surface landmarks.in the OR for the following indication(s): continuous blood pressure monitoring and blood sampling needed.    A 20 gauge (size), 1 and 3/4 inch (length), Angiocath (type) catheter was placed into the Right radial artery, secured by Tegaderm,   Seldinger technique used.  Events:  patient tolerated procedure well with no complications.

## 2025-01-29 LAB
ALBUMIN SERPL BCP-MCNC: 3 G/DL (ref 3.4–5)
ANION GAP SERPL CALC-SCNC: 14 MMOL/L (ref 10–20)
BUN SERPL-MCNC: 6 MG/DL (ref 6–23)
CALCIUM SERPL-MCNC: 8.1 MG/DL (ref 8.6–10.6)
CHLORIDE SERPL-SCNC: 101 MMOL/L (ref 98–107)
CO2 SERPL-SCNC: 25 MMOL/L (ref 21–32)
CREAT SERPL-MCNC: 0.7 MG/DL (ref 0.5–1.3)
EGFRCR SERPLBLD CKD-EPI 2021: >90 ML/MIN/1.73M*2
ERYTHROCYTE [DISTWIDTH] IN BLOOD BY AUTOMATED COUNT: 23.4 % (ref 11.5–14.5)
ERYTHROCYTE [DISTWIDTH] IN BLOOD BY AUTOMATED COUNT: 25.2 % (ref 11.5–14.5)
GLUCOSE BLD MANUAL STRIP-MCNC: 115 MG/DL (ref 74–99)
GLUCOSE BLD MANUAL STRIP-MCNC: 118 MG/DL (ref 74–99)
GLUCOSE SERPL-MCNC: 92 MG/DL (ref 74–99)
HCT VFR BLD AUTO: 23.6 % (ref 41–52)
HCT VFR BLD AUTO: 30.5 % (ref 41–52)
HGB BLD-MCNC: 7.8 G/DL (ref 13.5–17.5)
HGB BLD-MCNC: 8.9 G/DL (ref 13.5–17.5)
MAGNESIUM SERPL-MCNC: 1.62 MG/DL (ref 1.6–2.4)
MCH RBC QN AUTO: 28.2 PG (ref 26–34)
MCH RBC QN AUTO: 28.3 PG (ref 26–34)
MCHC RBC AUTO-ENTMCNC: 29.2 G/DL (ref 32–36)
MCHC RBC AUTO-ENTMCNC: 33.1 G/DL (ref 32–36)
MCV RBC AUTO: 85 FL (ref 80–100)
MCV RBC AUTO: 97 FL (ref 80–100)
NRBC BLD-RTO: 0 /100 WBCS (ref 0–0)
NRBC BLD-RTO: 0 /100 WBCS (ref 0–0)
PHOSPHATE SERPL-MCNC: 1.8 MG/DL (ref 2.5–4.9)
PLATELET # BLD AUTO: 276 X10*3/UL (ref 150–450)
PLATELET # BLD AUTO: 314 X10*3/UL (ref 150–450)
POTASSIUM SERPL-SCNC: 3.5 MMOL/L (ref 3.5–5.3)
PREALB SERPL-MCNC: 9.3 MG/DL (ref 18–40)
RBC # BLD AUTO: 2.77 X10*6/UL (ref 4.5–5.9)
RBC # BLD AUTO: 3.15 X10*6/UL (ref 4.5–5.9)
SODIUM SERPL-SCNC: 136 MMOL/L (ref 136–145)
TSH SERPL-ACNC: 1.43 MIU/L (ref 0.44–3.98)
WBC # BLD AUTO: 24.1 X10*3/UL (ref 4.4–11.3)
WBC # BLD AUTO: 25.6 X10*3/UL (ref 4.4–11.3)

## 2025-01-29 PROCEDURE — 2060000001 HC INTERMEDIATE ICU ROOM DAILY

## 2025-01-29 PROCEDURE — 2500000004 HC RX 250 GENERAL PHARMACY W/ HCPCS (ALT 636 FOR OP/ED): Mod: SE

## 2025-01-29 PROCEDURE — 82947 ASSAY GLUCOSE BLOOD QUANT: CPT

## 2025-01-29 PROCEDURE — 85027 COMPLETE CBC AUTOMATED: CPT | Performed by: STUDENT IN AN ORGANIZED HEALTH CARE EDUCATION/TRAINING PROGRAM

## 2025-01-29 PROCEDURE — 2500000004 HC RX 250 GENERAL PHARMACY W/ HCPCS (ALT 636 FOR OP/ED): Mod: SE | Performed by: STUDENT IN AN ORGANIZED HEALTH CARE EDUCATION/TRAINING PROGRAM

## 2025-01-29 PROCEDURE — 2500000005 HC RX 250 GENERAL PHARMACY W/O HCPCS: Mod: SE

## 2025-01-29 PROCEDURE — 84443 ASSAY THYROID STIM HORMONE: CPT | Performed by: STUDENT IN AN ORGANIZED HEALTH CARE EDUCATION/TRAINING PROGRAM

## 2025-01-29 PROCEDURE — 36415 COLL VENOUS BLD VENIPUNCTURE: CPT | Performed by: STUDENT IN AN ORGANIZED HEALTH CARE EDUCATION/TRAINING PROGRAM

## 2025-01-29 PROCEDURE — 2500000001 HC RX 250 WO HCPCS SELF ADMINISTERED DRUGS (ALT 637 FOR MEDICARE OP): Mod: SE | Performed by: STUDENT IN AN ORGANIZED HEALTH CARE EDUCATION/TRAINING PROGRAM

## 2025-01-29 PROCEDURE — 2500000005 HC RX 250 GENERAL PHARMACY W/O HCPCS: Mod: SE | Performed by: STUDENT IN AN ORGANIZED HEALTH CARE EDUCATION/TRAINING PROGRAM

## 2025-01-29 PROCEDURE — 99232 SBSQ HOSP IP/OBS MODERATE 35: CPT | Performed by: NURSE PRACTITIONER

## 2025-01-29 PROCEDURE — 99223 1ST HOSP IP/OBS HIGH 75: CPT

## 2025-01-29 PROCEDURE — 83735 ASSAY OF MAGNESIUM: CPT | Performed by: STUDENT IN AN ORGANIZED HEALTH CARE EDUCATION/TRAINING PROGRAM

## 2025-01-29 PROCEDURE — 2500000002 HC RX 250 W HCPCS SELF ADMINISTERED DRUGS (ALT 637 FOR MEDICARE OP, ALT 636 FOR OP/ED): Mod: SE

## 2025-01-29 PROCEDURE — 2500000001 HC RX 250 WO HCPCS SELF ADMINISTERED DRUGS (ALT 637 FOR MEDICARE OP): Mod: SE

## 2025-01-29 PROCEDURE — 2500000004 HC RX 250 GENERAL PHARMACY W/ HCPCS (ALT 636 FOR OP/ED): Mod: SE | Performed by: ANESTHESIOLOGY

## 2025-01-29 PROCEDURE — 84134 ASSAY OF PREALBUMIN: CPT | Performed by: STUDENT IN AN ORGANIZED HEALTH CARE EDUCATION/TRAINING PROGRAM

## 2025-01-29 PROCEDURE — 97162 PT EVAL MOD COMPLEX 30 MIN: CPT | Mod: GP | Performed by: PHYSICAL THERAPIST

## 2025-01-29 PROCEDURE — 80069 RENAL FUNCTION PANEL: CPT | Performed by: STUDENT IN AN ORGANIZED HEALTH CARE EDUCATION/TRAINING PROGRAM

## 2025-01-29 PROCEDURE — 2500000002 HC RX 250 W HCPCS SELF ADMINISTERED DRUGS (ALT 637 FOR MEDICARE OP, ALT 636 FOR OP/ED): Mod: SE | Performed by: STUDENT IN AN ORGANIZED HEALTH CARE EDUCATION/TRAINING PROGRAM

## 2025-01-29 PROCEDURE — 97165 OT EVAL LOW COMPLEX 30 MIN: CPT | Mod: GO

## 2025-01-29 PROCEDURE — 97530 THERAPEUTIC ACTIVITIES: CPT | Mod: GO

## 2025-01-29 RX ORDER — CLONAZEPAM 1 MG/1
1 TABLET ORAL EVERY 8 HOURS PRN
Status: DISCONTINUED | OUTPATIENT
Start: 2025-01-29 | End: 2025-01-29

## 2025-01-29 RX ORDER — ACETAMINOPHEN 160 MG/5ML
1000 SOLUTION ORAL EVERY 8 HOURS SCHEDULED
Status: DISCONTINUED | OUTPATIENT
Start: 2025-01-29 | End: 2025-02-06 | Stop reason: HOSPADM

## 2025-01-29 RX ORDER — SENNOSIDES 8.8 MG/5ML
10 LIQUID ORAL 2 TIMES DAILY
Status: DISCONTINUED | OUTPATIENT
Start: 2025-01-29 | End: 2025-02-06 | Stop reason: HOSPADM

## 2025-01-29 RX ORDER — NALOXONE HYDROCHLORIDE 0.4 MG/ML
0.2 INJECTION, SOLUTION INTRAMUSCULAR; INTRAVENOUS; SUBCUTANEOUS EVERY 5 MIN PRN
Status: DISCONTINUED | OUTPATIENT
Start: 2025-01-29 | End: 2025-02-06 | Stop reason: HOSPADM

## 2025-01-29 RX ORDER — PETROLATUM 420 MG/G
1 OINTMENT TOPICAL 3 TIMES DAILY
Status: DISCONTINUED | OUTPATIENT
Start: 2025-01-30 | End: 2025-02-06 | Stop reason: HOSPADM

## 2025-01-29 RX ORDER — DIAZEPAM 5 MG/1
5 TABLET ORAL EVERY 6 HOURS PRN
Status: DISCONTINUED | OUTPATIENT
Start: 2025-01-29 | End: 2025-01-29

## 2025-01-29 RX ORDER — DIAZEPAM 5 MG/1
5 TABLET ORAL ONCE
Status: COMPLETED | OUTPATIENT
Start: 2025-01-29 | End: 2025-01-29

## 2025-01-29 RX ORDER — CLONAZEPAM 1 MG/1
1 TABLET ORAL EVERY 6 HOURS PRN
Status: DISCONTINUED | OUTPATIENT
Start: 2025-01-29 | End: 2025-01-29

## 2025-01-29 RX ORDER — GABAPENTIN 250 MG/5ML
300 SOLUTION ORAL NIGHTLY
Status: DISCONTINUED | OUTPATIENT
Start: 2025-01-29 | End: 2025-02-06 | Stop reason: HOSPADM

## 2025-01-29 RX ORDER — SODIUM CHLORIDE 9 MG/ML
70 INJECTION, SOLUTION INTRAVENOUS CONTINUOUS
Status: DISCONTINUED | OUTPATIENT
Start: 2025-01-29 | End: 2025-01-31

## 2025-01-29 RX ORDER — DIAZEPAM 5 MG/ML
5 INJECTION, SOLUTION INTRAMUSCULAR; INTRAVENOUS ONCE
Status: COMPLETED | OUTPATIENT
Start: 2025-01-29 | End: 2025-01-29

## 2025-01-29 RX ORDER — MAGNESIUM SULFATE HEPTAHYDRATE 40 MG/ML
2 INJECTION, SOLUTION INTRAVENOUS ONCE
Status: COMPLETED | OUTPATIENT
Start: 2025-01-29 | End: 2025-01-29

## 2025-01-29 RX ORDER — POLYETHYLENE GLYCOL 3350 17 G/17G
17 POWDER, FOR SOLUTION ORAL DAILY
Status: DISCONTINUED | OUTPATIENT
Start: 2025-01-29 | End: 2025-02-06 | Stop reason: HOSPADM

## 2025-01-29 RX ORDER — ASPIRIN 325 MG
325 TABLET ORAL DAILY
Status: DISCONTINUED | OUTPATIENT
Start: 2025-01-29 | End: 2025-02-06 | Stop reason: HOSPADM

## 2025-01-29 RX ORDER — ENOXAPARIN SODIUM 100 MG/ML
40 INJECTION SUBCUTANEOUS EVERY 24 HOURS
Status: DISCONTINUED | OUTPATIENT
Start: 2025-01-29 | End: 2025-02-06 | Stop reason: HOSPADM

## 2025-01-29 RX ORDER — HYDROGEN PEROXIDE 3 %
1 SOLUTION, NON-ORAL MISCELLANEOUS 3 TIMES DAILY
Status: DISCONTINUED | OUTPATIENT
Start: 2025-01-29 | End: 2025-02-06 | Stop reason: HOSPADM

## 2025-01-29 RX ORDER — MIDAZOLAM HYDROCHLORIDE 1 MG/ML
4 INJECTION INTRAMUSCULAR; INTRAVENOUS ONCE
Status: COMPLETED | OUTPATIENT
Start: 2025-01-29 | End: 2025-01-29

## 2025-01-29 RX ORDER — ONDANSETRON HYDROCHLORIDE 2 MG/ML
4 INJECTION, SOLUTION INTRAVENOUS EVERY 8 HOURS PRN
Status: DISCONTINUED | OUTPATIENT
Start: 2025-01-29 | End: 2025-02-06 | Stop reason: HOSPADM

## 2025-01-29 RX ORDER — ESOMEPRAZOLE MAGNESIUM 40 MG/1
40 GRANULE, DELAYED RELEASE ORAL
Status: DISCONTINUED | OUTPATIENT
Start: 2025-01-29 | End: 2025-02-06 | Stop reason: HOSPADM

## 2025-01-29 RX ORDER — BACITRACIN 500 [USP'U]/G
OINTMENT TOPICAL 3 TIMES DAILY
Status: COMPLETED | OUTPATIENT
Start: 2025-01-29 | End: 2025-01-30

## 2025-01-29 RX ORDER — CLONAZEPAM 0.5 MG/1
1 TABLET ORAL 4 TIMES DAILY PRN
Status: DISCONTINUED | OUTPATIENT
Start: 2025-01-29 | End: 2025-02-06 | Stop reason: HOSPADM

## 2025-01-29 RX ADMIN — ACETAMINOPHEN 1000 MG: 160 SOLUTION ORAL at 09:27

## 2025-01-29 RX ADMIN — SODIUM CHLORIDE, POTASSIUM CHLORIDE, SODIUM LACTATE AND CALCIUM CHLORIDE 50 ML/HR: 600; 310; 30; 20 INJECTION, SOLUTION INTRAVENOUS at 15:32

## 2025-01-29 RX ADMIN — DIAZEPAM 5 MG: 5 TABLET ORAL at 12:06

## 2025-01-29 RX ADMIN — ASPIRIN 325 MG ORAL TABLET 325 MG: 325 PILL ORAL at 09:28

## 2025-01-29 RX ADMIN — CLONAZEPAM 1 MG: 1 TABLET ORAL at 00:18

## 2025-01-29 RX ADMIN — DIAZEPAM 5 MG: 5 TABLET ORAL at 02:09

## 2025-01-29 RX ADMIN — HYDROGEN PEROXIDE 1 APPLICATION: 30 SOLUTION TOPICAL at 09:27

## 2025-01-29 RX ADMIN — SENNA 10 ML: 417.12 SYRUP ORAL at 20:11

## 2025-01-29 RX ADMIN — CLONAZEPAM 1 MG: 1 TABLET ORAL at 09:29

## 2025-01-29 RX ADMIN — HYDROGEN PEROXIDE 1 APPLICATION: 30 SOLUTION TOPICAL at 20:11

## 2025-01-29 RX ADMIN — MIDAZOLAM HYDROCHLORIDE 4 MG: 1 INJECTION, SOLUTION INTRAMUSCULAR; INTRAVENOUS at 01:08

## 2025-01-29 RX ADMIN — ESOMEPRAZOLE MAGNESIUM 40 MG: 40 FOR SUSPENSION ORAL at 09:28

## 2025-01-29 RX ADMIN — SENNA 10 ML: 417.12 SYRUP ORAL at 09:29

## 2025-01-29 RX ADMIN — CLONAZEPAM 1 MG: 0.5 TABLET ORAL at 21:55

## 2025-01-29 RX ADMIN — SODIUM CHLORIDE 70 ML/HR: 9 INJECTION, SOLUTION INTRAVENOUS at 04:05

## 2025-01-29 RX ADMIN — Medication: at 17:26

## 2025-01-29 RX ADMIN — BACITRACIN: 500 OINTMENT TOPICAL at 15:31

## 2025-01-29 RX ADMIN — HYDROGEN PEROXIDE 1 APPLICATION: 30 SOLUTION TOPICAL at 15:31

## 2025-01-29 RX ADMIN — MAGNESIUM SULFATE HEPTAHYDRATE 2 G: 40 INJECTION, SOLUTION INTRAVENOUS at 16:12

## 2025-01-29 RX ADMIN — ONDANSETRON 4 MG: 2 INJECTION INTRAMUSCULAR; INTRAVENOUS at 00:25

## 2025-01-29 RX ADMIN — POTASSIUM PHOSPHATE, MONOBASIC POTASSIUM PHOSPHATE, DIBASIC 21 MMOL: 224; 236 INJECTION, SOLUTION, CONCENTRATE INTRAVENOUS at 17:26

## 2025-01-29 RX ADMIN — BACITRACIN: 500 OINTMENT TOPICAL at 20:11

## 2025-01-29 RX ADMIN — Medication 28 PERCENT: at 20:11

## 2025-01-29 RX ADMIN — GABAPENTIN 300 MG: 250 SOLUTION ORAL at 20:11

## 2025-01-29 RX ADMIN — AMPICILLIN SODIUM AND SULBACTAM SODIUM 3 G: 2; 1 INJECTION, POWDER, FOR SOLUTION INTRAMUSCULAR; INTRAVENOUS at 09:27

## 2025-01-29 RX ADMIN — DIAZEPAM 5 MG: 5 INJECTION, SOLUTION INTRAMUSCULAR; INTRAVENOUS at 03:42

## 2025-01-29 RX ADMIN — AMPICILLIN SODIUM AND SULBACTAM SODIUM 3 G: 2; 1 INJECTION, POWDER, FOR SOLUTION INTRAMUSCULAR; INTRAVENOUS at 15:31

## 2025-01-29 RX ADMIN — Medication 30 PERCENT: at 09:29

## 2025-01-29 RX ADMIN — BACITRACIN: 500 OINTMENT TOPICAL at 09:27

## 2025-01-29 RX ADMIN — AMPICILLIN SODIUM AND SULBACTAM SODIUM 3 G: 2; 1 INJECTION, POWDER, FOR SOLUTION INTRAMUSCULAR; INTRAVENOUS at 21:08

## 2025-01-29 RX ADMIN — POLYETHYLENE GLYCOL 3350 17 G: 17 POWDER, FOR SOLUTION ORAL at 09:28

## 2025-01-29 RX ADMIN — CLONAZEPAM 1 MG: 0.5 TABLET ORAL at 17:31

## 2025-01-29 RX ADMIN — Medication 35 PERCENT: at 04:03

## 2025-01-29 RX ADMIN — ACETAMINOPHEN 1000 MG: 160 SOLUTION ORAL at 17:33

## 2025-01-29 RX ADMIN — ENOXAPARIN SODIUM 40 MG: 100 INJECTION SUBCUTANEOUS at 09:28

## 2025-01-29 SDOH — ECONOMIC STABILITY: FOOD INSECURITY: WITHIN THE PAST 12 MONTHS, YOU WORRIED THAT YOUR FOOD WOULD RUN OUT BEFORE YOU GOT THE MONEY TO BUY MORE.: NEVER TRUE

## 2025-01-29 SDOH — SOCIAL STABILITY: SOCIAL INSECURITY: HAS ANYONE EVER THREATENED TO HURT YOUR FAMILY OR YOUR PETS?: NO

## 2025-01-29 SDOH — SOCIAL STABILITY: SOCIAL INSECURITY
WITHIN THE LAST YEAR, HAVE YOU BEEN RAPED OR FORCED TO HAVE ANY KIND OF SEXUAL ACTIVITY BY YOUR PARTNER OR EX-PARTNER?: NO

## 2025-01-29 SDOH — SOCIAL STABILITY: SOCIAL INSECURITY
WITHIN THE LAST YEAR, HAVE YOU BEEN KICKED, HIT, SLAPPED, OR OTHERWISE PHYSICALLY HURT BY YOUR PARTNER OR EX-PARTNER?: NO

## 2025-01-29 SDOH — SOCIAL STABILITY: SOCIAL INSECURITY: ARE YOU OR HAVE YOU BEEN THREATENED OR ABUSED PHYSICALLY, EMOTIONALLY, OR SEXUALLY BY ANYONE?: NO

## 2025-01-29 SDOH — SOCIAL STABILITY: SOCIAL INSECURITY: WITHIN THE LAST YEAR, HAVE YOU BEEN AFRAID OF YOUR PARTNER OR EX-PARTNER?: NO

## 2025-01-29 SDOH — SOCIAL STABILITY: SOCIAL INSECURITY: DO YOU FEEL UNSAFE GOING BACK TO THE PLACE WHERE YOU ARE LIVING?: NO

## 2025-01-29 SDOH — SOCIAL STABILITY: SOCIAL INSECURITY: ABUSE: ADULT

## 2025-01-29 SDOH — ECONOMIC STABILITY: INCOME INSECURITY: IN THE PAST 12 MONTHS HAS THE ELECTRIC, GAS, OIL, OR WATER COMPANY THREATENED TO SHUT OFF SERVICES IN YOUR HOME?: NO

## 2025-01-29 SDOH — SOCIAL STABILITY: SOCIAL INSECURITY: DOES ANYONE TRY TO KEEP YOU FROM HAVING/CONTACTING OTHER FRIENDS OR DOING THINGS OUTSIDE YOUR HOME?: NO

## 2025-01-29 SDOH — ECONOMIC STABILITY: FOOD INSECURITY: WITHIN THE PAST 12 MONTHS, THE FOOD YOU BOUGHT JUST DIDN'T LAST AND YOU DIDN'T HAVE MONEY TO GET MORE.: NEVER TRUE

## 2025-01-29 SDOH — SOCIAL STABILITY: SOCIAL INSECURITY: ARE THERE ANY APPARENT SIGNS OF INJURIES/BEHAVIORS THAT COULD BE RELATED TO ABUSE/NEGLECT?: NO

## 2025-01-29 SDOH — SOCIAL STABILITY: SOCIAL INSECURITY: HAVE YOU HAD ANY THOUGHTS OF HARMING ANYONE ELSE?: NO

## 2025-01-29 SDOH — ECONOMIC STABILITY: HOUSING INSECURITY: DO YOU FEEL UNSAFE GOING BACK TO THE PLACE WHERE YOU LIVE?: NO

## 2025-01-29 SDOH — SOCIAL STABILITY: SOCIAL INSECURITY: DO YOU FEEL ANYONE HAS EXPLOITED OR TAKEN ADVANTAGE OF YOU FINANCIALLY OR OF YOUR PERSONAL PROPERTY?: NO

## 2025-01-29 SDOH — SOCIAL STABILITY: SOCIAL INSECURITY: WITHIN THE LAST YEAR, HAVE YOU BEEN HUMILIATED OR EMOTIONALLY ABUSED IN OTHER WAYS BY YOUR PARTNER OR EX-PARTNER?: NO

## 2025-01-29 SDOH — SOCIAL STABILITY: SOCIAL INSECURITY: HAVE YOU HAD THOUGHTS OF HARMING ANYONE ELSE?: NO

## 2025-01-29 SDOH — SOCIAL STABILITY: SOCIAL INSECURITY: WERE YOU ABLE TO COMPLETE ALL THE BEHAVIORAL HEALTH SCREENINGS?: YES

## 2025-01-29 SDOH — SOCIAL STABILITY: SOCIAL INSECURITY
ASK PARENT OR GUARDIAN: ARE THERE TIMES WHEN YOU, YOUR CHILD(REN), OR ANY MEMBER OF YOUR HOUSEHOLD FEEL UNSAFE, HARMED, OR THREATENED AROUND PERSONS WITH WHOM YOU KNOW OR LIVE?: NO

## 2025-01-29 ASSESSMENT — PAIN - FUNCTIONAL ASSESSMENT
PAIN_FUNCTIONAL_ASSESSMENT: 0-10

## 2025-01-29 ASSESSMENT — COGNITIVE AND FUNCTIONAL STATUS - GENERAL
MOVING TO AND FROM BED TO CHAIR: A LITTLE
DRESSING REGULAR LOWER BODY CLOTHING: A LITTLE
EATING MEALS: A LITTLE
WALKING IN HOSPITAL ROOM: A LITTLE
WALKING IN HOSPITAL ROOM: A LITTLE
MOBILITY SCORE: 18
DAILY ACTIVITIY SCORE: 23
DAILY ACTIVITIY SCORE: 22
PERSONAL GROOMING: A LITTLE
DAILY ACTIVITIY SCORE: 20
MOVING FROM LYING ON BACK TO SITTING ON SIDE OF FLAT BED WITH BEDRAILS: A LITTLE
MOBILITY SCORE: 22
WALKING IN HOSPITAL ROOM: A LITTLE
WALKING IN HOSPITAL ROOM: A LITTLE
MOBILITY SCORE: 22
CLIMB 3 TO 5 STEPS WITH RAILING: A LITTLE
DAILY ACTIVITIY SCORE: 23
CLIMB 3 TO 5 STEPS WITH RAILING: A LITTLE
TOILETING: A LITTLE
TURNING FROM BACK TO SIDE WHILE IN FLAT BAD: A LITTLE
EATING MEALS: A LITTLE
CLIMB 3 TO 5 STEPS WITH RAILING: A LITTLE
DRESSING REGULAR UPPER BODY CLOTHING: A LITTLE
PATIENT BASELINE BEDBOUND: NO
CLIMB 3 TO 5 STEPS WITH RAILING: A LITTLE
EATING MEALS: A LITTLE
MOBILITY SCORE: 22
HELP NEEDED FOR BATHING: A LITTLE
STANDING UP FROM CHAIR USING ARMS: A LITTLE

## 2025-01-29 ASSESSMENT — ACTIVITIES OF DAILY LIVING (ADL)
HEARING - RIGHT EAR: FUNCTIONAL
WALKS IN HOME: INDEPENDENT
LACK_OF_TRANSPORTATION: YES
HEARING - LEFT EAR: FUNCTIONAL
BATHING_ASSISTANCE: STAND BY
DRESSING YOURSELF: INDEPENDENT
LACK_OF_TRANSPORTATION: YES
ADEQUATE_TO_COMPLETE_ADL: YES
PATIENT'S MEMORY ADEQUATE TO SAFELY COMPLETE DAILY ACTIVITIES?: YES
FEEDING YOURSELF: INDEPENDENT
TOILETING: INDEPENDENT
JUDGMENT_ADEQUATE_SAFELY_COMPLETE_DAILY_ACTIVITIES: YES
GROOMING: INDEPENDENT
BATHING: INDEPENDENT

## 2025-01-29 ASSESSMENT — COLUMBIA-SUICIDE SEVERITY RATING SCALE - C-SSRS
6. HAVE YOU EVER DONE ANYTHING, STARTED TO DO ANYTHING, OR PREPARED TO DO ANYTHING TO END YOUR LIFE?: NO
2. HAVE YOU ACTUALLY HAD ANY THOUGHTS OF KILLING YOURSELF?: NO
1. IN THE PAST MONTH, HAVE YOU WISHED YOU WERE DEAD OR WISHED YOU COULD GO TO SLEEP AND NOT WAKE UP?: NO

## 2025-01-29 ASSESSMENT — PAIN SCALES - GENERAL
PAINLEVEL_OUTOF10: 5 - MODERATE PAIN
PAINLEVEL_OUTOF10: 0 - NO PAIN
PAINLEVEL_OUTOF10: 10 - WORST POSSIBLE PAIN
PAINLEVEL_OUTOF10: 0 - NO PAIN
PAINLEVEL_OUTOF10: 10 - WORST POSSIBLE PAIN

## 2025-01-29 ASSESSMENT — LIFESTYLE VARIABLES
SKIP TO QUESTIONS 9-10: 0
AUDIT-C TOTAL SCORE: -1
HOW OFTEN DO YOU HAVE A DRINK CONTAINING ALCOHOL: 2-4 TIMES A MONTH
HOW MANY STANDARD DRINKS CONTAINING ALCOHOL DO YOU HAVE ON A TYPICAL DAY: PATIENT DECLINED
HOW OFTEN DO YOU HAVE 6 OR MORE DRINKS ON ONE OCCASION: PATIENT DECLINED
AUDIT-C TOTAL SCORE: -1

## 2025-01-29 ASSESSMENT — PATIENT HEALTH QUESTIONNAIRE - PHQ9
2. FEELING DOWN, DEPRESSED OR HOPELESS: SEVERAL DAYS
1. LITTLE INTEREST OR PLEASURE IN DOING THINGS: NOT AT ALL
SUM OF ALL RESPONSES TO PHQ9 QUESTIONS 1 & 2: 1

## 2025-01-29 NOTE — PROGRESS NOTES
Physical Therapy                 Therapy Communication Note    Patient Name: Sim Guaman  MRN: [id number]  Department: [facility]  Room: [facility]-  Today's Date: 1/29/2025     Discipline: Physical Therapy          Missed Visit Reason: Missed Visit Reason:  (Pt with increased agitation and anxiety this AM. RN requests PT to hold at this time. Will re-att as schedule permits)    Missed Time: Attempt    Comment:

## 2025-01-29 NOTE — CARE PLAN
The patient's goals for the shift include      The clinical goals for the shift include patient safety      Problem: Pain - Adult  Goal: Verbalizes/displays adequate comfort level or baseline comfort level  Outcome: Progressing     Problem: Safety - Adult  Goal: Free from fall injury  Outcome: Progressing     Problem: Discharge Planning  Goal: Discharge to home or other facility with appropriate resources  Outcome: Progressing     Problem: Chronic Conditions and Co-morbidities  Goal: Patient's chronic conditions and co-morbidity symptoms are monitored and maintained or improved  Outcome: Progressing     Problem: Nutrition  Goal: Nutrient intake appropriate for maintaining nutritional needs  Outcome: Progressing     Problem: Pain  Goal: Takes deep breaths with improved pain control throughout the shift  Outcome: Progressing  Goal: Turns in bed with improved pain control throughout the shift  Outcome: Progressing  Goal: Walks with improved pain control throughout the shift  Outcome: Progressing  Goal: Performs ADL's with improved pain control throughout shift  Outcome: Progressing  Goal: Participates in PT with improved pain control throughout the shift  Outcome: Progressing  Goal: Free from opioid side effects throughout the shift  Outcome: Progressing  Goal: Free from acute confusion related to pain meds throughout the shift  Outcome: Progressing     Problem: Fall/Injury  Goal: Not fall by end of shift  Outcome: Progressing  Goal: Be free from injury by end of the shift  Outcome: Progressing     Problem: Respiratory  Goal: Clear secretions with interventions this shift  Outcome: Progressing  Goal: Minimize anxiety/maximize coping throughout shift  Outcome: Progressing  Goal: No signs of respiratory distress (eg. Use of accessory muscles. Peds grunting)  Outcome: Progressing  Goal: Patent airway maintained this shift  Outcome: Progressing  Goal: Wean oxygen to maintain O2 saturation per order/standard this  shift  Outcome: Progressing  Goal: Increase self care and/or family involvement in next 24 hours  Outcome: Progressing

## 2025-01-29 NOTE — SIGNIFICANT EVENT
"ENT Flap Check  Sim Guaman   is a 44 y.o. male who on 1/28/2025 underwent DL, trach, PEG (Akil), pharyngectomy, right parasymphyseal mandibulotomy with recon bar, bilat SND 1-4, excision of R retropharyngeal LN, recon with L RFFF, STSG.       Physical Examination\"  Vitals: Temp:  [35.9 °C (96.6 °F)-37.4 °C (99.3 °F)] 36.8 °C (98.2 °F)  Heart Rate:  [] 88  Resp:  [12-25] 20  BP: ()/(55-91) 104/66  Arterial Line BP 1: (128-175)/(62-85) 175/85  FiO2 (%):  [30 %-35 %] 30 %  Gen: NAD, resting comfortably in chair  Face/Neck: All incisions c/d/i, neck soft and flat without evidence of hematoma or fluid collection  -Internal doppler with strong arterial signal  -Trach in midline position without bleeding or thick tracheal secretions  Oral Cavity: All intraoral incisions c/d/i without evidence of dehiscence  -Flap soft and pink with good capillary refill  Extremities: left arm warm with intact movement and sensation  -left thigh skin graft donor site with tegaderm in place  Abdomen: PEG in place  Drains: All drains in place and holding suction with serosanguinous drainage (stripped)    Assessment:  S/p DL, trach, PEG (Akil), pharyngectomy, right parasymphyseal mandibulotomy with recon bar, bilat SND 1-4, excision of R retropharyngeal LN, recon with L RFFF, STSG 1/29 with Dr. Mclaughlin and Dr. Ferguson.     Plan:  -Continue q6hr flap checks      Tamika Mcmanus MD - PGY2  Otolaryngology - Head & Neck Surgery  [facility]    I am the night resident. I can only be contacted from 5 PM to 6 AM. Page on weekends or off hours.   ENT Consult pager: [phone]  ENT Peds pager: [phone]  ENT Head & Neck Surgery Phone: [phone]  ENT subspecialty team: Mick individual resident who wrote today's note  ENT Outpatient scheduling number: [phone]  Please Page [phone] or call [phone] if Urgent        "

## 2025-01-29 NOTE — PROGRESS NOTES
Occupational Therapy                 Therapy Communication Note    Patient Name: Sim Guaman  MRN: [id number]  Department: [facility]  Room: [facility]-A  Today's Date: 1/29/2025     Discipline: Occupational Therapy    Missed Visit Reason:  (Pt with increased agitation and anxiety this AM. RN requests OT to hold at this time. Will re-att as schedule permits)    Missed Time: 1023 Attempt

## 2025-01-29 NOTE — SIGNIFICANT EVENT
Subjective:  S/p DL, trach, PEG (Akil), pharyngectomy, right parasymphyseal mandibulotomy with recon bar, bilat SND 1-4, excision of R retropharyngeal LN, recon with L RFFF, STSG. Patient seen for midnight flap check and experiencing extreme anxiety, thrashing around, intermittently tachy to 140s, coughing very forcefully, asking for his trach to be taken out, trying to get out of bed. Attempted to redirect patient without much success.  Ordered prior home dose of 1mg Klonopin, PACU nurse contacted anesthesia and requested evaluation.      Objective:  Vitals reviewed in EMR  Gen: NAD, extremely agitated  Face/Neck: All incisions c/d/i, neck soft and flat without evidence of hematoma or fluid collection  -Internal doppler with strong arterial signal  -Trach in midline position without bleeding or thick tracheal secretions  Oral Cavity: All intraoral incisions c/d/i without evidence of dehiscence  -Flap soft and pink with good capillary refill  Extremities: left arm warm with intact movement and sensation  -left thigh skin graft donor site with tegaderm in place  Abdomen: PEG in place  Drains: All drains in place and holding suction with serosanguinous drainage (stripped)    Assessment/Plan:  S/p DL, trach, PEG (Akil), pharyngectomy, right parasymphyseal mandibulotomy with recon bar, bilat SND 1-4, excision of R retropharyngeal LN, recon with L RFFF, STSG 1/29 with Dr. Mclaughlin and Dr. Ferguson.     Patient seen for midnight flap check and experiencing extreme anxiety, thrashing around, intermittently tachy to 140s, coughing very forcefully, asking for his trach to be taken out, trying to get out of bed. Attempted to redirect patient without much success.  Ordered prior home dose of 1mg Klonopin, PACU nurse contacted anesthesia and requested evaluation to consider further anxiolytics.      -Continue q6hr flap checks    Tamika Mcmanus MD  ENT [id number]

## 2025-01-29 NOTE — NURSING NOTE
"KORI Initial Assessment:    KORI services consulted by RN due to high anxiety resulting in attempts to pull at lines/drains and trach. Per nursing, behaviors in PACU included restlessness, attempts to get OOB, and SOB. As pt has a trach, education has been provided regarding new airway and management of comfort.     On arrival, pt is laying in PACU visibly uncomfortable and restless. Please see MAR for medication administration by MGIUELINA Membreno prior to arrival. Pt also has a Hydromorphone PCA pump for pain management post op. As pt has a new airway he communicates via pen and paper.  Pt asks to stand up, education provided about safety and medications that he received that pose a safety/falls risk. Reassurance and comfort measures provided. Orders are in to transfer pt to [facility]. Pt made aware of transfer once stable. Pt and RN deny further needs or concerns.    0525: Pt seen on [facility] resting comfortably in bed. 1:1 patient observer present at bedside for safety. Per nursing, pt was restless upon arrival. As comfort measures were provided, the pt was able to remain calm and cooperative. KORI RN did not disturb patient in order to promote rest during hospitalization. Nursing reports no new concerns at this time.    KORI services available to patient and staff 24/7 throughout hospitalization. KORI will continue to perform q12 hr rounding to ensure safety of patient and staff. Please secure chat \"KORI\" with any questions or concerns.   "

## 2025-01-29 NOTE — PROGRESS NOTES
"  Ear Nose & Throat Progress Note           Sim Guaman is a 44 y.o. male on day 1 of admission presenting with Cancer of soft palate (Multi).  Flap checks stable overnight.    Subjective   Patient has been very agitated, aggressive, and severely anxious post operatively. Patient has needed medication to throughout the night and into the morning hours. This morning on rounds after taking the trach cuff down , all of the patient's neck drains lost suction. The cuff was re-inflated and the drains did hold suction again.  The team will engage supportive oncology to help with his anxiety management.     Objective   Physical Exam  Vitals reviewed in EMR  Gen: NAD, AOx3, resting comfortably in bed  Eyes: EOMI, sclera clear, PERRL  Ears: Normal external ears bilaterally  Nose: No rhinorrhea; anterior nares clear  Oral Cavity: All intraoral incisions c/d/i without evidence of dehiscence  -Flap soft and pink with good capillary refill  Head: normocephalic, atraumatic  Face/Neck:All incisions c/d/i, neck soft and flat without evidence of hematoma or fluid collection  -Internal doppler with strong arterial signal  -Trach in midline position without bleeding or thick tracheal secretions  Resp: 6.0 cuffed Shiley, cuff up, no labored breathing on   Cards: RRR on Doppler  Gastro: Soft, non-distended,  PEG tube in place  : Abrams catheter in place clear yellow urine  MSK: left arm warm with intact movement and sensation  -left thigh skin graft donor site with tegaderm in place  Psych: Appropriate mood and affect  Drains: All drains in place and holding suction with serosanguinous drainage (stripped)    Last Recorded Vitals  Blood pressure 112/76, pulse 100, temperature 36.6 °C (97.9 °F), resp. rate 22, height 1.702 m (5' 7\"), weight 58.4 kg (128 lb 12 oz), SpO2 96%.  Intake/Output last 3 Shifts:  I/O last 3 completed shifts:  In: 5736.4 (98.2 mL/kg) [I.V.:4986.4 (85.4 mL/kg); IV Piggyback:750]  Out: 5575 (95.5 mL/kg) " [Urine:4845 (2.3 mL/kg/hr); Drains:380; Blood:350]  Weight: 58.4 kg     Relevant Results  Results for orders placed or performed during the hospital encounter of 01/28/25 (from the past 24 hours)   Blood Gas Arterial Full Panel   Result Value Ref Range    POCT pH, Arterial 7.41 7.38 - 7.42 pH    POCT pCO2, Arterial 39 38 - 42 mm Hg    POCT pO2, Arterial 177 (H) 85 - 95 mm Hg    POCT SO2, Arterial 99 94 - 100 %    POCT Oxy Hemoglobin, Arterial 97.1 94.0 - 98.0 %    POCT Hematocrit Calculated, Arterial 28.0 (L) 41.0 - 52.0 %    POCT Sodium, Arterial 137 136 - 145 mmol/L    POCT Potassium, Arterial 3.2 (L) 3.5 - 5.3 mmol/L    POCT Chloride, Arterial 107 98 - 107 mmol/L    POCT Ionized Calcium, Arterial 0.97 (L) 1.10 - 1.33 mmol/L    POCT Glucose, Arterial 131 (H) 74 - 99 mg/dL    POCT Lactate, Arterial 1.0 0.4 - 2.0 mmol/L    POCT Base Excess, Arterial 0.1 -2.0 - 3.0 mmol/L    POCT HCO3 Calculated, Arterial 24.7 22.0 - 26.0 mmol/L    POCT Hemoglobin, Arterial 9.3 (L) 13.5 - 17.5 g/dL    POCT Anion Gap, Arterial 9 (L) 10 - 25 mmo/L    Patient Temperature 37.0 degrees Celsius    FiO2 43 %      No results found.     Scheduled medications  acetaminophen, 1,000 mg, g-tube, q8h ELANA  ampicillin-sulbactam, 3 g, intravenous, q6h  aspirin, 325 mg, g-tube, Daily  bacitracin, , Topical, TID  enoxaparin, 40 mg, subcutaneous, q24h  esomeprazole, 40 mg, g-tube, Daily before breakfast  hydrogen peroxide, 1 Application, Topical, TID  oxygen, , inhalation, Continuous - Inhalation  polyethylene glycol, 17 g, g-tube, Daily  senna, 10 mL, g-tube, BID  [START ON 1/30/2025] white petrolatum, 1 Application, Topical, TID      Continuous medications  HYDROmorphone,   lactated Ringer's, 50 mL/hr, Last Rate: 50 mL/hr (01/29/25 1026)  sodium chloride 0.9%, 70 mL/hr, Last Rate: 70 mL/hr (01/29/25 5575)      PRN medications  PRN medications: diazePAM, naloxone, ondansetron                         Assessment/Plan   Assessment & Plan  Cancer of soft  palate (Multi)    Assessment:  Sim Guaman is a 44 year old male with a diagnosis of Oropharyngeal SCC with known neck metastasis statu post Direct Laryngoscopy, tracheotomy, PEG (Akil), pharyngectomy, right parasymphyseal mandibulotomy with recon bar, bilateral Selective Neck Dissection levels I-IV, excision of R retropharyngeal Lymph Node, reconconstruction with Left Radial Forearm Free Flap, and Split Thickness Skin Graft on 1/29/2025  with Dr. Mclaughlin and Dr. Ferguson.     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  Cervical Lymph Node Mets  Nicotine Dependence  Acute Blood Loss Anemia  Dysphagia  Severe Anxiety  Agitation  Tachycardia, self resolves when patient is clam        Plan:  -Flap Checks: q6hrs ENT team checks and nursing per flap protocol  -Drains: Monitor output  -Analgesia:  Dilaudid PCA, Scheduled Acetaminophen  -Neuro: PRN Diazepam  5mg Q6hrs, Consult Supportive Oncology   -FEN: mIVFs, NPO, TF @ trickle rate; monitor and replete electrolytes as needed  -Pulm: s/p trach, humidified air at all times, standard trach care/suctioning/teaching; wean oxygen to room air, Keep cuff up for 5 days  -ID: Unasyn 3g Q6hrs x 1day, stop date 1/29  -Cardiac: Vitals per ENT free flap protocol then transition to Q4hr vitals; Aspirin 325mg daily  -Endo: No current interventions  -GI: Bowel regimen, PPI,  and PRN anti-emetic  -: Abrams catheter discontinued, afternoon void check  -Steroids/Special: Incision and oral care per ENT free flap protocol  -Embolic PPx: SQLovenox, SCDs while in bed  -Dispo: [facility]. PT/OT ordered; Discharge planning for POD 6 home with home care vs skilled nursing facility   -Dispo planning will difficult secondary to patient's insurance & location; it is true for both Home care & SNF        All plans discussed with attendings after morning rounds.       I spent 35 minutes in the professional and overall care of this patient.      Ligia Gordon APRN, CNP  Certified Family Nurse  Practitioner  Nurse Practitioner III  Department of Otolaryngology: Head & Neck Surgery  Personal Pager [phone]  ENT Team  Head and Neck Phone: [phone]

## 2025-01-29 NOTE — CONSULTS
SUPPORTIVE AND PALLIATIVE ONCOLOGY CONSULT    Inpatient consult to [facility]  Consult performed by: Spring Cesar, APRN-CNP  Consult ordered by: Susie Ferguson MD      SERVICE DATE: 1/29/2025    PALLIATIVE MEDICINE OUTPATIENT PROVIDER:  None  CURRENT ATTENDING PROVIDER: Susie Ferguson MD     Medical Oncologist: No care team member to display   Radiation Oncologist: No care team member to display  Primary Physician: No Assigned PCP Generic Provider  None    REASON FOR CONSULT/CHIEF CONSULT COMPLAINT: Pain and anxiety management    Subjective   HISTORY OF PRESENT ILLNESS: Sim Guaman is a 44 y.o. 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 PMHx significant for anxiety, hypothyroidism, and arthritis. Now s/p DL, tracheostomy, PEG, pharyngectomy, R parasymphyseal mandibulotomy with recon bar, bilat SND 1-4, excision of R retropharyngeal LN, recon with L RFFF, and STSG on 1/28/25. Supportive and Palliative Oncology is consulted for assistance with pain and anxiety management.     During my initial visit, pt visibly frustrated, anxious, and uncomfortable. Because of this, difficult to conduct interview and only limited ROS and GOC able to be completed. Will re-visit both those elements of pt's care once pain and anxiety better controlled.     Summarized Recommendations (1/29/25):   Change hydromorphone PCA settings:  Patient Bolus Dose: 0.4mg  Lockout Interval: 10 minutes  Basal Rate: NONE  Once Hour Dose Limit: 2.4mg  Consider starting gabapentin 300mg PO once daily HS--will assist with neuropathic pain and possibly aide mood  Change clonazepam 1mg via PEG q6h PRN for anxiety [home regimen]  Consider consulting Psych for anxiety management and regimen optimization    Pain Assessment:  Onset: Acute on chronic  Location: Bilateral and anterior neck  Duration: Constant  Characteristics:  Rating: 10/10  Descriptors: Aching, throbbing,  sharp, poking  Aggravating: Movement, swallowing, coughing   Relieving: Analgesics--PCA only helping slightly, positioning, and modifying activity  Intolerances: None  Personal Pain Goal: Pt declining to state   Interference with Function: Very much  Coping Strategies: SHMUEL, pt currently very anxious, frustrated  Emotional Response: Anxiety, frustration  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 Please note this table is based on best available evidence but conversions are still approximate. These are NOT opioid DOSES for individual patient use; this is equivalency information.  Drug Parenteral Enteral   Morphine 10 25   Oxycodone N/A 20   Hydromorphone 2 5   Fentanyl 0.15 N/A   Tramadol N/A 120   Citation: Shama CARRASCO. Demystifying opioid conversion calculations: A guide for effective dosing, Second edition. MD Blank: American Society of Health-System Pharmacists, 2018.    OARRS/PDMP reviewed, no aberrant behavior noted.    Symptom Assessment:  Pain: very much   Headache: very much   Dizziness: none  Anxiety: very much  Frustrated: very much  Pain in mouth/swallowing: very much  Shortness of breath: a little--every now and then with coughing episodes  Lack of appetite: tolerating trickle TF   Nausea: none  Vomiting: none  Constipation: none--states he had a BM this AM, 25  Weight loss: None per chart review    Information obtained from: chart review, interview of patient, and discussion with primary team  ______________________________________________________________________      Oncology History    No history exists.     Past Medical History:   Diagnosis Date    Anxiety     Arthritis     Cancer of soft palate (Multi)     Cataract     Dysphagia      Past Surgical History:   Procedure Laterality Date    CATARACT EXTRACTION       Family History   Problem Relation Name Age of Onset    Cancer Mother        SOCIAL HISTORY:   Social History:  reports that he quit smoking about 4 months ago. His smoking use included cigarettes. He has been exposed to tobacco smoke. He has never used smokeless tobacco. He reports that he does not currently use alcohol. He reports that he does not use drugs.   to wife, Corinne    REVIEW OF SYSTEMS:  Review of systems negative unless noted in HPI.     Objective     Lab Results   Component Value Date    WBC 9.5 01/10/2025    HGB 11.4 (L) 01/10/2025    HCT 35.4 (L) 01/10/2025    MCV 86 01/10/2025     01/10/2025      Lab Results   Component Value Date    GLUCOSE 83 01/10/2025    CALCIUM 8.9 01/10/2025     01/10/2025    K 4.4 01/10/2025    CO2 28 01/10/2025     01/10/2025    BUN 6 01/10/2025    CREATININE 0.78 01/10/2025     Lab Results   Component Value Date    ALT 14 01/10/2025    AST 28 01/10/2025    ALKPHOS 72 01/10/2025    BILITOT 0.2 01/10/2025     CrCl cannot be calculated (Patient's most recent lab result is older than the maximum 3 days allowed.).     No results found for this or any previous visit (from the past 4464 hours).  Wt Readings from Last 5 Encounters:   01/28/25 58.4 kg (128 lb 12 oz)   01/10/25 57.2 kg (126 lb)   11/25/24 57.9 kg (127 lb 11.2 oz)     Current Outpatient Medications   Medication Instructions    clonazePAM (KLONOPIN) 1 mg, Every 6 hours PRN    HYDROmorphone (Dilaudid) 2 mg tablet TAKE 2 TABLETS BY MOUTH EVERY 4 HOURS FOR 7 DAYS AS NEEDED    lidocaine (Xylocaine) 2 % solution Take 15 mL by mouth every 6 hours if needed.    multivitamin with minerals tablet 1 tablet, Daily    oxyCODONE (Roxicodone) 15 mg  immediate release tablet TAKE ONE TABLET BY MOUTH EVERY FOUR HOURS AS NEEDED FOR PAIN    oxyCODONE-acetaminophen (Percocet) 7.5-325 mg tablet 1 tablet, Every 6 hours PRN     Scheduled medications   acetaminophen, 1,000 mg, g-tube, q8h ELANA  ampicillin-sulbactam, 3 g, intravenous, q6h  aspirin, 325 mg, g-tube, Daily  bacitracin, , Topical, TID  enoxaparin, 40 mg, subcutaneous, q24h  esomeprazole, 40 mg, g-tube, Daily before breakfast  hydrogen peroxide, 1 Application, Topical, TID  oxygen, , inhalation, Continuous - Inhalation  polyethylene glycol, 17 g, g-tube, Daily  senna, 10 mL, g-tube, BID  [START ON 1/30/2025] white petrolatum, 1 Application, Topical, TID    Continuous medications  HYDROmorphone,   lactated Ringer's, 50 mL/hr, Last Rate: 50 mL/hr (01/28/25 2128)  sodium chloride 0.9%, 70 mL/hr, Last Rate: 70 mL/hr (01/29/25 0405)    PRN medications  clonazePAM, 1 mg, q8h PRN  naloxone, 0.2 mg, q5 min PRN  ondansetron, 4 mg, q8h PRN    Allergies: No Known Allergies    PHYSICAL EXAMINATION:  Vital Signs:   Vital signs reviewed  Vitals:    01/29/25 0915   BP: 130/81   Pulse: (!) 111   Resp: 18   Temp: 36.6 °C (97.9 °F)   SpO2: 96%     Pain Score: 10 - Worst possible pain    Physical Exam  Vitals reviewed.   Constitutional:       Comments: Alert, ill appearing gentleman laying in bed. Visibly uncomfortable, coughing frequently, and rocking back and forth in chair. He remains moderately cooperative and participating in interview, although somewhat limited due to pain and anxiety symptom burdens.      HENT:      Head:      Comments: Normocephalic, bilateral jaw line with mild non-pitting edema.     Eyes:      Comments: Sclera clear, EOM intact.    Neck:      Comments: Tracheostomy midline. No evidence of drainage or bleeding at tracheostomy site.   Pulmonary:      Comments: Symmetrical chest rise. Regular rate and depth of respirations. Room air via trache.   Abdominal:      Comments: Abdomen non distended, non  tender, and soft. PEG present.    Musculoskeletal:      Comments: Normal muscle strength and tone. ADAMS x4. No visible extremity edema.   Skin:     Comments: No lesions, rash, or abrasions present on visible skin. Skin color appropriate for ethnicity. L forearm wound vac in place.    Neurological:      Comments: A&Ox4, follows commands, no apparent sensory deficits.   Psychiatric:      Comments: Extremely anxious, frustrated, and rocking back and forth in chair.        ASSESSMENT/PLAN:  Sim Guaman is a 44 y.o. 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 PMHx significant for anxiety, hypothyroidism, and arthritis. Now s/p DL, tracheostomy, PEG, pharyngectomy, R parasymphyseal mandibulotomy with recon bar, bilat SND 1-4, excision of R retropharyngeal LN, recon with L RFFF, and STSG on 1/28/25. Supportive and Palliative Oncology is consulted for assistance with pain and anxiety management.      Neoplasm Related Pain  Post Operative Pain  Bilateral and anterior neck pain related to known malignancy. C/b accompanying headaches.   Acute post-operative pain 2/2 DL, tracheostomy, PEG, pharyngectomy, R parasymphyseal mandibulotomy with recon bar, bilat SND 1-4, excision of R retropharyngeal LN, recon with L RFFF, and STSG on 1/28/25.  Type: Somatic, possibly neuropathic  Pain control: Sub-optimally controlled  Home regimen: acetaminophen, oxycodone IR 15mg q4h PRN   Intolerances: None  Previously tried: hydromorphone IR PO, Percocet  Total OME usage for the past 24 hours: 155 OME  No recent labs on file, although no documented hx of renal or hepatic impairment.   Change hydromorphone PCA settings:  Patient Bolus Dose: 0.4mg  Lockout Interval: 10 minutes  Basal Rate: NONE  Once Hour Dose Limit: 2.4mg   Continue scheduled acetaminophen 1g oral liquid via PEG q8h  Consider starting gabapentin 300mg PO once daily HS--will assist with neuropathic pain and possibly aide  mood  Continue to monitor pain scores and administer PRN medications as appropriate  Continue/initiate nonpharmacologic pain management strategies including ice/heat therapy, distraction techniques, deep breathing/relaxation techniques, calming music, and repositioning  Continue to monitor for signs of opioid efficacy (pain scores, improved functionality) and toxicity (pinpoint pupils, excess sedation/drowsiness/confusion, respiratory depression, etc.)  Likely exacerbated by uncontrolled anxiety, see recs    Nausea  At risk for nausea and vomiting related to recent surgery and opioid use   Home regimen: None  No recent EKG on file in EMR.  PPI per primary   Continue ondansetron 4mg IV q8h PRN for n/v, first line    Constipation  At risk for constipation related to recent surgery and opioid use, currently not constipated.  Usual bowel pattern: Every day  Home regimen: None  LBM: 1/29/25 AM per pt  Continue scheduled Miralax 17g via PEG once daily  Continue scheduled senna syrup 10ml via PEG BID  Goal to have BM without straining q48-72h, adjust regimen as needed  Encourage mobility as tolerated, PT/OT following    Altered Mood  Acute on chronic anxiety related to pain and hospitalization.   Sub-optimally controlled.   Home regimen: clonazepam 1mg q6h PRN, had not been taking  buspirone at home  Change clonazepam 1mg via PEG q6h PRN for anxiety [home regimen]  Consider consulting Psych for anxiety management and regimen optimization  Spiritual Care following for support    Altered Nutrition  Altered nutrition related to malignancy, recent surgery s/p trache/PEG, new TF initiation.   Weight loss: None  Home regimen: None  Consider consulting Nutrition if needed  Currently on trickle TF--tolerating    Medical Decision Making/Goals of Care/Advance Care Planning:  (1/29/25) Complete GOC/ACP planning not completed due to uncontrolled pain and emotional symptom burden. Confirmed surrogate decision maker and Full Code status  with pt.     Advance Directives  Existence of Advance Directives: None  Decision maker: Surrogate decision maker is pt's wife, Corinne ([phone])  Code Status: Full Code    Introduction to Supportive and Palliative Oncology:  Spoke with pt at bedside.  Introduced the role and philosophy of Supportive and Palliative oncology in the evaluation and management of symptoms during cancer treatment.  Palliative care was introduced as a service for patients with serious illness to help with symptoms, assist with goals of care conversations, navigate complex decision making, improve quality of life for patients, and provide support both patients and families.  Patient seemed to appreciate the extra layer of support.     Supportive Interventions: Interventions: SPO Spiritual Care: already following for support    Disposition:  Please start the process of having prior authorization with meds to beds deliver medications to patient prior to discharge via [facility]. Prescriptions will need to be sent 48-72 hours prior to discharge so that a prior authorization can be completed.     Discharge date pending resolution of acute hospital issues and symptom control.  Will determine if pt would like to pursue appointment with outpatient Supportive Oncology. Pt previously receiving pain and symptom medications from his PCP closer to home in [place], [place].     Signature and billing:  Medical complexity was high level due to due to complexity of problems, extensive data review, and high risk of management/treatment.    I spent 75 minutes in the care of this patient which included chart review, interviewing patient/family, discussion with primary team, coordination of care, and documentation.    DATA   Diagnostic tests and information reviewed for today's visit:  Conversation with primary team, Most recent labs and imaging results, Most recent EKG [acknowledged none on file], Medications     Some elements copied from ENT's notes  on 1/28/25 and 1/29/25, the elements have been updated and all reflect current decision making from today, 1/29/2025.    Plan of Care discussed with: Primary team, pt     Thank you for asking Supportive and Palliative Oncology to assist with care of this patient.  Recommendations will be communicated back to the consulting service by way of shared electronic medical record/secure chat/email or face-to-face.   We will continue to follow.  Please contact us for additional questions or concerns.    SIGNATURE: SHEY Gomes-SUMAN  PAGER/CONTACT:  Contact information:  Supportive and Palliative Oncology  Monday-Friday 8 AM-5 PM  Epic Secure chat or pager [phone].  After hours and weekends:  pager [phone]

## 2025-01-29 NOTE — PROGRESS NOTES
Spiritual Care Visit  Spiritual Care Request    Reason for Visit:  Routine Visit: Introduction  Continue Visiting: Yes     Request Received From:  Referral From: Nurse,     Focus of Care:  Visited With: Patient       Refer to :  Referral To:        Spiritual Care Assessment    Care Provided:  Intended Effects: Demonstrate caring and concern, Establish rapport and connectedness  Methods: Collaborate with care team member, Offer support  Interventions: Acknowledge current situation    Sense of Community and or Mosque Affiliation:  Worship      Spiritual Care Annotation    Annotation:   introduced self and role to patient Sim Guaman. Patient was receiving medication and about to be cleaned up. He expressed appreciation for visit through giving  a thumbs up.  offered to say a prayer with him or hold him in prayer, and he requested  hold him in prayer, which  assured him of. Patient requested a visit at another time. Spiritual Care will follow up, provide ongoing support, and remains available as needed/requested.    Rev. Latoya Rossi MDiv, BCC

## 2025-01-29 NOTE — SIGNIFICANT EVENT
"ENT Flap Check  Sim Guaman   is a 44 y.o. male who on 2025 underwent DL, trach, PEG (Akil), pharyngectomy, right parasymphyseal mandibulotomy with recon bar, bilat SND 1-4, excision of R retropharyngeal LN, recon with L RFFF, STSG. Seen for flap check during pm rounds with attending. Anxiety much improved from prior at this time. Added Diazepam prn. VSS. Pain and nausea controlled.      Physical Examination\"  Vitals: Temp:  [36.1 °C (97 °F)-37 °C (98.6 °F)] 36.2 °C (97.2 °F)  Heart Rate:  [] 78  Resp:  [12-25] 20  BP: ()/(55-91) 105/61  Arterial Line BP 1: (128-175)/(62-85) 175/85  FiO2 (%):  [35 %] 35 %  Gen: NAD, resting comfortably in chair  Face/Neck: All incisions c/d/i, neck soft and flat without evidence of hematoma or fluid collection  -Internal doppler with strong arterial signal  -Trach in midline position without bleeding or thick tracheal secretions  Oral Cavity: All intraoral incisions c/d/i without evidence of dehiscence  -Flap soft and pink with good capillary refill  Extremities: left arm warm with intact movement and sensation  -left thigh skin graft donor site with tegaderm in place  Abdomen: PEG in place  Drains: All drains in place and holding suction with serosanguinous drainage (stripped)    Assessment:  S/p DL, trach, PEG (Akil), pharyngectomy, right parasymphyseal mandibulotomy with recon bar, bilat SND 1-4, excision of R retropharyngeal LN, recon with L RFFF, STSG  with Dr. Mcalughlin and Dr. Ferguson. Seen for noon flap check, resting comfortably with surgical site healing well.    Plan:  -Continue q6hr flap checks    J Carlos Jang DO, PGY4  Otolaryngology - Head & Neck Surgery  ENT Consult pager: [phone]  Peds pager: [phone]  Adult Head & Neck Phone: [phone]  ENT Subspecialty (otology, rhinology, laryngology, plastics, sleep):   WILMA-F 6am-5pm- EpicChat or page the resident who wrote the daily note   Nights & weekends- [phone]  ENT Outpatient [phone] "   Please page if urgent

## 2025-01-29 NOTE — ANESTHESIA POSTPROCEDURE EVALUATION
Patient: Sim Guaman    Procedure Summary       Date: 01/28/25 Room / Location: [facility] OR 03 / [facility] OR    Anesthesia Start: 0725 Anesthesia Stop: 2110    Procedures:       Upper Extremity Free Flap      SURGICAL PROCUREMENT, GRAFT, SKIN, SPLIT-THICKNESS, HEAD AND NECK REGION      DISSECTION, NECK (Bilateral)      PHARYNGECTOMY      RECONSTRUCTION, MANDIBLE      EGD, WITH PEG TUBE INSERTION Diagnosis:       Cancer of soft palate (Multi)      (Cancer of soft palate (Multi) [C05.1])    Surgeons: Vivienne Mclaughlin MD; Susie Ferguson MD; Mani Barnard MD Responsible Provider: Sean Ramos MD    Anesthesia Type: general ASA Status: 3            Anesthesia Type: general    Vitals Value Taken Time   /89 01/28/25 2104   Temp 36 01/28/25 2110   Pulse 113 01/28/25 2107   Resp 15 01/28/25 2107   SpO2 100 % 01/28/25 2107   Vitals shown include unfiled device data.    Anesthesia Post Evaluation    Patient location during evaluation: PACU  Patient participation: complete - patient participated  Level of consciousness: awake and alert and anxious  Pain score: 3  Pain management: adequate  Airway patency: patent  Cardiovascular status: acceptable, hemodynamically stable and blood pressure returned to baseline  Respiratory status: acceptable, spontaneous ventilation, unassisted and face mask  Hydration status: acceptable  Postoperative Nausea and Vomiting: none        There were no known notable events for this encounter.

## 2025-01-29 NOTE — PROGRESS NOTES
Physical Therapy    Physical Therapy Evaluation    Patient Name: Sim Guaman  MRN: [id number]  Department: [facility]  Room: [address] [place] [address]  Today's Date: 1/29/2025   Time Calculation  Start Time: 1359  Stop Time: 1423  Time Calculation (min): 24 min    Assessment/Plan   PT Assessment  PT Assessment Results: Decreased endurance, Decreased safety awareness, Decreased mobility  Rehab Prognosis: Good  Barriers to Discharge Home: Physical needs  Physical Needs: Ambulating household distances limited by function/safety  Evaluation/Treatment Tolerance: Other (Comment)  Medical Staff Made Aware: Yes  End of Session Communication: Bedside nurse  Assessment Comment: Pt presents to physical therapy following admisson for pharyngectomy, right parasymphyseal mandibulotomy.Pt particpated in sit to stand transfer with CGA. Pt limited by restlessness and anxiety this date, would benefit from continued skilled physical therapy to continue to assess mobility.  End of Session Patient Position: Up in chair, Alarm off, caregiver present (sitter and RN in room)  IP OR SWING BED PT PLAN  Inpatient or Swing Bed: Inpatient  PT Plan  Treatment/Interventions: Bed mobility, Transfer training, Stair training, Gait training, Balance training, Neuromuscular re-education, Strengthening, Endurance training, Therapeutic exercise, Therapeutic activity, Home exercise program, Positioning  PT Plan: Ongoing PT  PT Frequency: 3 times per week  PT Discharge Recommendations: Low intensity level of continued care (pending progress)  Equipment Recommended upon Discharge:  (TBD)  PT Recommended Transfer Status: Assist x1  PT - OK to Discharge: Yes (Once medically cleared)    Subjective   General Visit Information:  General  Reason for Referral: S/p DL, trach, PEG (Akil), pharyngectomy, right parasymphyseal mandibulotomy with recon bar, bilat SND 1-4, excision of R retropharyngeal LN, recon with L RFFF,  Past Medical History Relevant to Rehab: 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 RN requests PT to hold at this time. Will re-att as schedule permits)  Family/Caregiver Present: Yes  Caregiver Feedback: sitter present at bedside  Co-Treatment: OT  Co-Treatment Reason: To maximize safety and functional mobility  Prior to Session Communication: Bedside nurse  Patient Position Received: Alarm off, not on at start of session, Up in chair  Preferred Learning Style: verbal  General Comment: Pt found supine in bed, high anxious during evaluation but agreeable to limited session.  Home Living:  Home Living  Type of Home: House  Lives With: Spouse  Home Adaptive Equipment:  (unknown)  Home Access: Stairs to enter without rails  Home Living Comments: Pt provided limited history. Pt stating he lives at home with his spouse, and has stairs to enter. Unable to obtain additional information this date.  Prior Level of Function:  Prior Function Per Pt/Caregiver Report  Prior Function Comments: Per  note, patient was independent note, patient was independent with ADLs prior to admisson.  Precautions:  Precautions  Medical Precautions: Fall precautions      Date/Time Vitals Session Patient Position Pulse Resp SpO2 BP MAP (mmHg)    01/29/25 1331 --  --  78  18  98 %  107/63  --     01/29/25 1416 --  --  90  16  100 %  114/65  --                 Objective   Pain:  Pain Assessment  Pain Assessment:  (pt did not rate pain during sesson)  Cognition:       General Assessments:  Activity Tolerance  Endurance: Tolerates less than 10 min exercise with changes in vital signs    Strength  Strength Comments: Unble to formally assess       Static Sitting Balance  Static Sitting-Balance Support: Feet supported  Static Sitting-Level of Assistance: Close supervision  Dynamic Sitting Balance  Dynamic Sitting-Balance Support: Feet supported  Dynamic Sitting-Level of Assistance: Close  supervision    Static Standing Balance  Static Standing-Balance Support: Bilateral upper extremity supported  Static Standing-Level of Assistance: Contact guard  Functional Assessments:  Transfers  Transfer: Yes  Transfer 1  Transfer From 1: Sit to, Stand to  Transfer to 1: Stand, Sit  Technique 1: Sit to stand, Stand to sit  Transfer Device 1:  (hand held assistx2)  Transfer Level of Assistance 1: Contact guard  Trials/Comments 1: x2 trials.  Pt appeared restless and anxous while seated in chair upon arrival into room. Pt demonstarting several forceful coughs. RN aware and assessed patient for need for suctioning. Pt impuslsive with transfer despite education, required CGA for transfer    Outcome Measures:  [facility] Basic Mobility  Turning from your back to your side while in a flat bed without using bedrails: A little  Moving from lying on your back to sitting on the side of a flat bed without using bedrails: A little  Moving to and from bed to chair (including a wheelchair): A little  Standing up from a chair using your arms (e.g. wheelchair or bedside chair): A little  To walk in hospital room: A little  Climbing 3-5 steps with railing: A little  Basic Mobility - Total Score: 18    Encounter Problems       Encounter Problems (Active)       PT Problem       Pt will perform supine to sit transfer independently  (Progressing)       Start:  01/29/25    Expected End:  02/12/25            Pt will ambulate 300 feet with mod I and LRAD  (Progressing)       Start:  01/29/25    Expected End:  02/12/25            Pt will ascend/descend 10 steps with mod I and LRAD  (Progressing)       Start:  01/29/25    Expected End:  02/12/25            Pt will score >24 on tinetti to ensure low falls risk  (Progressing)       Start:  01/29/25    Expected End:  02/12/25               Pain - Adult              Education Documentation  Precautions, taught by Sirisha Louis, PT at 1/29/2025  2:55 PM.  Learner: Patient  Readiness:  Acceptance  Method: Explanation  Response: Needs Reinforcement    Body Mechanics, taught by Sirisha Louis, PT at 1/29/2025  2:55 PM.  Learner: Patient  Readiness: Acceptance  Method: Explanation  Response: Needs Reinforcement    Mobility Training, taught by Sirisha Louis, PT at 1/29/2025  2:55 PM.  Learner: Patient  Readiness: Acceptance  Method: Explanation  Response: Needs Reinforcement    Education Comments  No comments found.

## 2025-01-29 NOTE — PROGRESS NOTES
Spiritual Care Visit      introduced self in-person to the patient, previously we had only met over the phone. We spoke briefly about how he was feeling today, the surgery, and his discomfort.  offered a supportive and non-anxious presence with the patient and will continue to follow while he is at [facility].     Rev. Lion Mike, Supportive Oncology

## 2025-01-29 NOTE — PROGRESS NOTES
01/29/25 1100   Discharge Planning   Living Arrangements Spouse/significant other   Support Systems Spouse/significant other   Assistance Needed trach   Type of Residence Private residence   Do you have animals or pets at home? Yes   Type of Animals or Pets dog   Who is requesting discharge planning? Provider   Home or Post Acute Services In home services   Type of Home Care Services Home nursing visits;DME or oxygen   Expected Discharge Disposition Home H   Does the patient need discharge transport arranged? No   Financial Resource Strain   How hard is it for you to pay for the very basics like food, housing, medical care, and heating? Somewhat   Housing Stability   In the last 12 months, was there a time when you were not able to pay the mortgage or rent on time? N   In the past 12 months, how many times have you moved where you were living? 0   At any time in the past 12 months, were you homeless or living in a shelter (including now)? N   Transportation Needs   In the past 12 months, has lack of transportation kept you from medical appointments or from getting medications? yes   In the past 12 months, has lack of transportation kept you from meetings, work, or from getting things needed for daily living? Yes   Patient Choice   Provider Choice list and CMS website (https://medicare.gov/care-compare#search) for post-acute Quality and Resource Measure Data were provided and reviewed with: Patient   Patient / Family choosing to utilize agency / facility established prior to hospitalization No     Care Transitions Note    Plan per Medical/Surgical Team: S/p DL, trach, PEG (Akil), pharyngectomy, right parasymphyseal mandibulotomy with recon bar, bilat SND 1-4, excision of R retropharyngeal LN, recon with L RFFF   Status: inpatient   [facility] [place]  Discharge disposition: home with home care vs SNF  Expected date of discharge: 2/4/25  Barriers: finding home care agency that accepts insurance   PCP /  Primary Oncologist: none  [facility] [address] [place] [address]  Preferred home care agency: blanket referral    TCC met with patient at bedside to discuss anticipated discharge needs. Demographics and insurance verifed with patient. Patient lives at home with spouse and was independent with ADLs prior to admission. Patient reports difficulty with transportation and getting to appointments. Patient is agreeable to TCC sending a blanket referral for home health care. Will continue to follow for any discharge planning needs.   Selene Carrizales RN TCC

## 2025-01-29 NOTE — PROGRESS NOTES
[facility]  ACUTE CARE SURGERY - PROGRESS NOTE    Patient Name: Sim Guaman  MRN: [id number]  Admit Date: 128  : 1980  AGE: 44 y.o.   GENDER: male  ==============================================================================  TODAY'S ASSESSMENT AND PLAN OF CARE:  Sim Guaman is a 44 y.o. 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 Barnard on 25 and concurrent tracheostomy, pharyngectomy, neck dissection and neck reconstruction with the ENT team. He is progressing well since PEG placement and it remains in place at 2.5cm from the skin.    Plan:  - Ok to start Tube Feeds at discretion of primary team.  - Maintain PEG in place  - Please call with any questions or concerns    Patient seen with senior resident, Dr. Stewart; discussed with Dr. Akil Lamar MD  [facility] [address]    ==============================================================================  CHIEF COMPLAINT / EVENTS LAST 24HRS / HPI:  POD1 from OR with ENT/SurgOnc    MEDICAL HISTORY / ROS:   Admission history and ROS reviewed. Pertinent changes as follows:  None    PHYSICAL EXAM:  Heart Rate:  []   Temp:  [36.1 °C (97 °F)-37 °C (98.6 °F)]   Resp:  [12-25]   BP: ()/(55-91)   SpO2:  [90 %-100 %]   Gen: NAD, AOx3, resting comfortably in bed  Eyes: EOMI, sclera clear, PERRL  Ears: Normal external ears bilaterally  Head: normocephalic, atraumatic  Face/Neck:All incisions c/d/i, neck soft and flat without evidence of hematoma or fluid collection. Trach in midline position   Cards: RRR on Doppler  Gastro: Soft, non-distended,  PEG tube in place at 2.5cm from the skin  : Abrams catheter in place clear yellow urine    IMAGING SUMMARY:  (summary of new imaging findings, not a copy of dictation)  No new imaging    LABS:                No lab exists for component:  "\"LABALBU\"      Results from last 7 days   Lab Units 01/28/25  1156   POCT PH, ARTERIAL pH 7.41   POCT PCO2, ARTERIAL mm Hg 39   POCT PO2, ARTERIAL mm Hg 177*   POCT HCO3 CALCULATED, ARTERIAL mmol/L 24.7   POCT BASE EXCESS, ARTERIAL mmol/L 0.1       I have reviewed all medications, laboratory results, and imaging pertinent for today's encounter.    "

## 2025-01-29 NOTE — PROGRESS NOTES
Occupational Therapy    Evaluation and Treatment    Patient Name: Sim Guaman  MRN: [id number]  Today's Date: 1/29/2025  Room: [address] [place] [address]  Time Calculation  Start Time: 1359  Stop Time: 1423  Time Calculation (min): 24 min    Assessment  IP OT Assessment  OT Assessment: Pt demos significant agitation and restlessness during session. Pt is very frustrated w/ amount of lines, inability to speak, amount of secretions, and pain. Pt demos the physical ability to complete ADLs and anticipate w/ improved pain control and line removals pt's affect will improve. Continued skilled OT services are not indicated at this time.  Barriers to Discharge Home: Cognition needs  Cognition Needs: 24hr supervision for safety awareness needed  Evaluation/Treatment Tolerance: Treatment limited secondary to agitation  Medical Staff Made Aware: Yes  End of Session Communication: Bedside nurse  End of Session Patient Position: Up in chair, Alarm off, caregiver present (Sitter and RN present)  Plan:  Inpatient Plan  No Skilled OT: No acute OT goals identified  OT Frequency: OT eval only  OT Discharge Recommendations: No further acute OT, No OT needed after discharge  OT - OK to Discharge: Yes  OT Assessment  OT Assessment Results: Decreased ADL status, Decreased endurance, Decreased safe judgment during ADL, Decreased functional mobility, Decreased IADLs  Evaluation/Treatment Tolerance: Treatment limited secondary to agitation  Medical Staff Made Aware: Yes  Strengths: Premorbid level of function  Barriers to Participation: Comorbidities    Subjective   Current Problem:  1. Cancer of soft palate (Multi)  Surgical Pathology Exam    Surgical Pathology Exam    Surgical Pathology Exam    Surgical Pathology Exam    Drain/Tube Care        General:  Reason for Referral: This 45 y/o male w/ squamous cell carcinoma of the oropharynx w/ bilat neck mets presents s/p  trach, PEG, pharyngectomy, R parasymphyseal mandibulotomy with recon bar,  bilat SND 1-4, excision of R retropharyngeal LN, recon with L RFFF, and STSG 1/28. Post op course has been c/b high anxiety and attempts to pull lines/drains/trach.  Past Medical History Relevant to Rehab: Anxiety, OA, dysphagia, Squamous cell carcinoma of oropharynx w/ bilat neck mets  Co-Treatment: PT  Co-Treatment Reason: To maximize pt safety in the setting of agitation and high medical complexity  Prior to Session Communication: Bedside nurse  Patient Position Received: Alarm off, not on at start of session, Up in chair  Caregiver Feedback: Sitter present on approach, remains for session  General Comment: Pt seated in chair on approach, resting but easily arousable. Pt highly anxious, but agreeable to OT assment   Precautions:  Medical Precautions: Fall precautions  Vital Signs:   Date/Time Vitals Session Patient Position Pulse Resp SpO2 BP MAP (mmHg)    01/29/25 1416 --  --  90  16  100 %  114/65  --     01/29/25 1533 --  --  81  14  100 %  105/61  --           Pain:  Pain Assessment  Pain Assessment: 0-10  0-10 (Numeric) Pain Score: 10 - Worst possible pain  Pain Type: Surgical pain  Lines/Tubes/Drains:  Surgical Airway Shiley Cuffed 6 (Active)   Number of days: 0       Closed/Suction Drain 1 Right Neck Bulb 10 Fr. (Active)   Number of days: 0       Closed/Suction Drain 2 Right Neck Bulb 10 Fr. (Active)   Number of days: 0       Closed/Suction Drain 3 Left Neck Bulb 10 Fr. (Active)   Number of days: 0       Closed/Suction Drain 4 Left Bulb 10 Fr. (Active)   Number of days: 0       Gastrostomy/Enterostomy PEG-jejunostomy 1 20 Fr. LUQ (Active)   Number of days: 1         Objective   Cognition:  Overall Cognitive Status: Within Functional Limits  Home Living:  Type of Home: House  Lives With: Spouse  Home Adaptive Equipment:  (Unknown; unable to attain d/t pt agitation)  Home Access: Stairs to enter without rails  Home Living Comments: Limited home s/u available d/t pt agitation. States he lives in a house w/  spouse and has stairs to enter house.   Prior Function:  Level of [place]: Independent with ADLs and functional transfers, Independent with homemaking with ambulation  Prior Function Comments: Pt reports being IND prior, unknown if pt was driving or working prior.  ADL:  Eating Assistance: Not performed  Eating Deficit: NPO, Feeding tube  Grooming Assistance: Stand by  Bathing Assistance: Stand by  UE Dressing Assistance: Stand by  LE Dressing Assistance: Stand by  Toileting Assistance with Device: Stand by  Activity Tolerance:  Endurance: Tolerates less than 10 min exercise with changes in vital signs  Bed Mobility/Transfers: Bed Mobility/Transfers:    Transfer 1  Technique 1: Sit to stand, Stand to sit  Transfer Device 1:  (No AD)  Transfer Level of Assistance 1: Contact guard  Trials/Comments 1: 2x throughout session. Pt impulsive and not allowing therapist time to organize lines prior to standing, however becomes agitated when lines are in the way of his transfer.  Strength:  Strength Comments: BUE appear WFL  Coordination:  Movements are Fluid and Coordinated: Yes   Hand Function:  Hand Function  Gross Grasp: Functional  Coordination: Functional  Extremities:   RUE   RUE : Within Functional Limits, LUE   LUE: Within Functional Limits  Outcome Measures: [facility] Daily Activity  Putting on and taking off regular lower body clothing: A little  Bathing (including washing, rinsing, drying): A little  Putting on and taking off regular upper body clothing: A little  Toileting, which includes using toilet, bedpan or urinal: A little  Taking care of personal grooming such as brushing teeth: None  Eating Meals: None (anticipated)  Daily Activity - Total Score: 20         ,     OT Adult Other Outcome Measures  4AT: unable to assess    Education Documentation  Body Mechanics, taught by Alyssa Grant OT at 1/29/2025  4:08 PM.  Learner: Patient  Readiness: Acceptance  Method: Explanation  Response: Verbalizes  Understanding    Precautions, taught by Alyssa Crouch OT at 1/29/2025  4:08 PM.  Learner: Patient  Readiness: Acceptance  Method: Explanation  Response: Verbalizes Understanding    ADL Training, taught by Alyssa Crouch OT at 1/29/2025  4:08 PM.  Learner: Patient  Readiness: Acceptance  Method: Explanation  Response: Verbalizes Understanding    Education Comments  No comments found.      Treatment Completed on Evaluation    Therapy/Activity:     Extended time for line mngmt and for pt agitation. Pt repositioned for comfort.      01/29/25 at 4:09 PM   ALYSSA CROUCH OT   Rehab Office: [phone]

## 2025-01-30 LAB
ALBUMIN SERPL BCP-MCNC: 3 G/DL (ref 3.4–5)
ANION GAP SERPL CALC-SCNC: 8 MMOL/L (ref 10–20)
BUN SERPL-MCNC: 8 MG/DL (ref 6–23)
CALCIUM SERPL-MCNC: 8.2 MG/DL (ref 8.6–10.6)
CHLORIDE SERPL-SCNC: 104 MMOL/L (ref 98–107)
CO2 SERPL-SCNC: 30 MMOL/L (ref 21–32)
CREAT SERPL-MCNC: 0.65 MG/DL (ref 0.5–1.3)
EGFRCR SERPLBLD CKD-EPI 2021: >90 ML/MIN/1.73M*2
ERYTHROCYTE [DISTWIDTH] IN BLOOD BY AUTOMATED COUNT: 24.1 % (ref 11.5–14.5)
GLUCOSE BLD MANUAL STRIP-MCNC: 106 MG/DL (ref 74–99)
GLUCOSE SERPL-MCNC: 82 MG/DL (ref 74–99)
HCT VFR BLD AUTO: 23.2 % (ref 41–52)
HGB BLD-MCNC: 7.5 G/DL (ref 13.5–17.5)
MAGNESIUM SERPL-MCNC: 2.07 MG/DL (ref 1.6–2.4)
MCH RBC QN AUTO: 27.9 PG (ref 26–34)
MCHC RBC AUTO-ENTMCNC: 32.3 G/DL (ref 32–36)
MCV RBC AUTO: 86 FL (ref 80–100)
NRBC BLD-RTO: 0 /100 WBCS (ref 0–0)
PHOSPHATE SERPL-MCNC: 2.4 MG/DL (ref 2.5–4.9)
PLATELET # BLD AUTO: 305 X10*3/UL (ref 150–450)
POTASSIUM SERPL-SCNC: 3.6 MMOL/L (ref 3.5–5.3)
RBC # BLD AUTO: 2.69 X10*6/UL (ref 4.5–5.9)
SODIUM SERPL-SCNC: 138 MMOL/L (ref 136–145)
WBC # BLD AUTO: 14.1 X10*3/UL (ref 4.4–11.3)

## 2025-01-30 PROCEDURE — 2500000004 HC RX 250 GENERAL PHARMACY W/ HCPCS (ALT 636 FOR OP/ED): Mod: SE | Performed by: NURSE PRACTITIONER

## 2025-01-30 PROCEDURE — 2060000001 HC INTERMEDIATE ICU ROOM DAILY

## 2025-01-30 PROCEDURE — 99233 SBSQ HOSP IP/OBS HIGH 50: CPT

## 2025-01-30 PROCEDURE — 99233 SBSQ HOSP IP/OBS HIGH 50: CPT | Performed by: NURSE PRACTITIONER

## 2025-01-30 PROCEDURE — 2500000001 HC RX 250 WO HCPCS SELF ADMINISTERED DRUGS (ALT 637 FOR MEDICARE OP): Mod: SE | Performed by: STUDENT IN AN ORGANIZED HEALTH CARE EDUCATION/TRAINING PROGRAM

## 2025-01-30 PROCEDURE — 83735 ASSAY OF MAGNESIUM: CPT | Performed by: STUDENT IN AN ORGANIZED HEALTH CARE EDUCATION/TRAINING PROGRAM

## 2025-01-30 PROCEDURE — 2500000005 HC RX 250 GENERAL PHARMACY W/O HCPCS: Mod: SE

## 2025-01-30 PROCEDURE — 82947 ASSAY GLUCOSE BLOOD QUANT: CPT

## 2025-01-30 PROCEDURE — 84100 ASSAY OF PHOSPHORUS: CPT | Performed by: STUDENT IN AN ORGANIZED HEALTH CARE EDUCATION/TRAINING PROGRAM

## 2025-01-30 PROCEDURE — 2500000004 HC RX 250 GENERAL PHARMACY W/ HCPCS (ALT 636 FOR OP/ED): Mod: SE | Performed by: STUDENT IN AN ORGANIZED HEALTH CARE EDUCATION/TRAINING PROGRAM

## 2025-01-30 PROCEDURE — 36415 COLL VENOUS BLD VENIPUNCTURE: CPT | Performed by: STUDENT IN AN ORGANIZED HEALTH CARE EDUCATION/TRAINING PROGRAM

## 2025-01-30 PROCEDURE — 2500000004 HC RX 250 GENERAL PHARMACY W/ HCPCS (ALT 636 FOR OP/ED): Mod: SE

## 2025-01-30 PROCEDURE — 2500000005 HC RX 250 GENERAL PHARMACY W/O HCPCS: Mod: SE | Performed by: STUDENT IN AN ORGANIZED HEALTH CARE EDUCATION/TRAINING PROGRAM

## 2025-01-30 PROCEDURE — 85027 COMPLETE CBC AUTOMATED: CPT | Performed by: STUDENT IN AN ORGANIZED HEALTH CARE EDUCATION/TRAINING PROGRAM

## 2025-01-30 RX ORDER — PETROLATUM 420 MG/G
1 OINTMENT TOPICAL 3 TIMES DAILY
Qty: 113 G | Refills: 0 | Status: SHIPPED | OUTPATIENT
Start: 2025-01-30 | End: 2025-02-04

## 2025-01-30 RX ORDER — BISMUTH TRIBROMOPH/PETROLATUM 5"X9"
1 BANDAGE TOPICAL DAILY
Qty: 30 EACH | Refills: 2 | Status: SHIPPED | OUTPATIENT
Start: 2025-01-30 | End: 2025-02-04

## 2025-01-30 RX ADMIN — HYDROGEN PEROXIDE 1 APPLICATION: 30 SOLUTION TOPICAL at 09:11

## 2025-01-30 RX ADMIN — Medication: at 17:30

## 2025-01-30 RX ADMIN — ACETAMINOPHEN 1000 MG: 160 SOLUTION ORAL at 04:45

## 2025-01-30 RX ADMIN — CLONAZEPAM 1 MG: 0.5 TABLET ORAL at 04:45

## 2025-01-30 RX ADMIN — HYDROGEN PEROXIDE 1 APPLICATION: 30 SOLUTION TOPICAL at 20:44

## 2025-01-30 RX ADMIN — ACETAMINOPHEN 1000 MG: 160 SOLUTION ORAL at 15:34

## 2025-01-30 RX ADMIN — ENOXAPARIN SODIUM 40 MG: 100 INJECTION SUBCUTANEOUS at 09:10

## 2025-01-30 RX ADMIN — SODIUM CHLORIDE 70 ML/HR: 9 INJECTION, SOLUTION INTRAVENOUS at 04:44

## 2025-01-30 RX ADMIN — BACITRACIN: 500 OINTMENT TOPICAL at 20:44

## 2025-01-30 RX ADMIN — Medication 21 PERCENT: at 20:45

## 2025-01-30 RX ADMIN — CLONAZEPAM 1 MG: 0.5 TABLET ORAL at 17:30

## 2025-01-30 RX ADMIN — CLONAZEPAM 1 MG: 0.5 TABLET ORAL at 11:11

## 2025-01-30 RX ADMIN — BACITRACIN: 500 OINTMENT TOPICAL at 09:11

## 2025-01-30 RX ADMIN — ASPIRIN 325 MG ORAL TABLET 325 MG: 325 PILL ORAL at 09:10

## 2025-01-30 RX ADMIN — Medication: at 09:11

## 2025-01-30 RX ADMIN — HYDROGEN PEROXIDE 1 APPLICATION: 30 SOLUTION TOPICAL at 15:34

## 2025-01-30 RX ADMIN — ESOMEPRAZOLE MAGNESIUM 40 MG: 40 FOR SUSPENSION ORAL at 04:45

## 2025-01-30 RX ADMIN — BACITRACIN: 500 OINTMENT TOPICAL at 15:34

## 2025-01-30 RX ADMIN — CLONAZEPAM 1 MG: 0.5 TABLET ORAL at 23:14

## 2025-01-30 RX ADMIN — GABAPENTIN 300 MG: 250 SOLUTION ORAL at 20:44

## 2025-01-30 RX ADMIN — POTASSIUM PHOSPHATE, MONOBASIC POTASSIUM PHOSPHATE, DIBASIC 21 MMOL: 224; 236 INJECTION, SOLUTION, CONCENTRATE INTRAVENOUS at 17:30

## 2025-01-30 RX ADMIN — ACETAMINOPHEN 1000 MG: 160 SOLUTION ORAL at 23:14

## 2025-01-30 RX ADMIN — Medication 28 PERCENT: at 09:15

## 2025-01-30 ASSESSMENT — COGNITIVE AND FUNCTIONAL STATUS - GENERAL
DAILY ACTIVITIY SCORE: 22
MOBILITY SCORE: 22
WALKING IN HOSPITAL ROOM: A LITTLE
CLIMB 3 TO 5 STEPS WITH RAILING: A LITTLE
EATING MEALS: A LITTLE
PERSONAL GROOMING: A LITTLE

## 2025-01-30 ASSESSMENT — PAIN SCALES - GENERAL
PAINLEVEL_OUTOF10: 5 - MODERATE PAIN
PAINLEVEL_OUTOF10: 10 - WORST POSSIBLE PAIN
PAINLEVEL_OUTOF10: 5 - MODERATE PAIN

## 2025-01-30 ASSESSMENT — PAIN DESCRIPTION - LOCATION: LOCATION: FACE

## 2025-01-30 ASSESSMENT — PAIN - FUNCTIONAL ASSESSMENT
PAIN_FUNCTIONAL_ASSESSMENT: 0-10

## 2025-01-30 ASSESSMENT — PAIN DESCRIPTION - ORIENTATION: ORIENTATION: LEFT;RIGHT

## 2025-01-30 NOTE — CONSULTS
"Nutrition Initial Assessment:   Nutrition Assessment    Reason for Assessment: Enteral assessment/recommendation (TF)    Patient is a 44 y.o. male with diagnosis of Oropharyngeal SCC with known neck metastasis s/p direct laryngoscopy, tracheotomy, PEG, pharyngectomy, R presymphyseal mandibulotomy with recon bar, bilateral selective neck dissection level I-IV, excision of R retropharyngeal lymph node, reconstruction with L radial forearm free flap, and split thickness skin graft (1/29)    Past Medical History:   Diagnosis Date    Anxiety     Arthritis     Cancer of soft palate (Multi)     Cataract     Dysphagia      Past Surgical History:   Procedure Laterality Date    CATARACT EXTRACTION           Nutrition History:  Food and Nutrient History: Attempted to meet with pt multiple attempts. Pt anxious 1/29 post surgery and this morning falling asleep during conversation. Will attempt to obtain more information as able.       Anthropometrics:  Height: 170.2 cm (5' 7\")   Weight: 58.4 kg (128 lb 12 oz)   BMI (Calculated): 20.16  IBW/kg (Dietitian Calculated): 67.3 kg  Percent of IBW: 87 %       Weight History:   Wt Readings from Last 15 Encounters:   01/28/25 58.4 kg (128 lb 12 oz)   01/10/25 57.2 kg (126 lb)   11/25/24 57.9 kg (127 lb 11.2 oz)       Weight Change %:  Weight History / % Weight Change: Limited hx  Significant Weight Loss: No    Nutrition Focused Physical Exam Findings:  Unable to evaluate, visually noticed mild-moderate wasting on temporalis  Muscle Wasting:  Temporalis: Mild-Moderate (slight depression)  Edema:  Edema: none  Physical Findings:  Skin: Positive    Nutrition Significant Labs:  CBC Trend:   Results from last 7 days   Lab Units 01/29/25  1528 01/29/25  1236   WBC AUTO x10*3/uL 25.6* 24.1*   RBC AUTO x10*6/uL 3.15* 2.77*   HEMOGLOBIN g/dL 8.9* 7.8*   HEMATOCRIT % 30.5* 23.6*   MCV fL 97 85   PLATELETS AUTO x10*3/uL 276 314    , BMP Trend:   Results from last 7 days   Lab Units 01/29/25  1236 " "  GLUCOSE mg/dL 92   CALCIUM mg/dL 8.1*   SODIUM mmol/L 136   POTASSIUM mmol/L 3.5   CO2 mmol/L 25   CHLORIDE mmol/L 101   BUN mg/dL 6   CREATININE mg/dL 0.70    , A1C:No results found for: \"HGBA1C\", BG POCT trend:   Results from last 7 days   Lab Units 01/30/25  0435 01/29/25  1828 01/29/25  1231   POCT GLUCOSE mg/dL 106* 115* 118*    , Vit D: No results found for: \"VITD25\" , Vit B12: No results found for: \"ISQUCGRG36\"     Medications:  Scheduled medications  acetaminophen, 1,000 mg, g-tube, q8h ELANA  aspirin, 325 mg, g-tube, Daily  bacitracin, , Topical, TID  enoxaparin, 40 mg, subcutaneous, q24h  esomeprazole, 40 mg, g-tube, Daily before breakfast  gabapentin, 300 mg, g-tube, Nightly  hydrogen peroxide, 1 Application, Topical, TID  oxygen, , inhalation, Continuous - Inhalation  polyethylene glycol, 17 g, g-tube, Daily  senna, 10 mL, g-tube, BID  white petrolatum, 1 Application, Topical, TID      Continuous medications  HYDROmorphone,   sodium chloride 0.9%, 70 mL/hr, Last Rate: 70 mL/hr (01/30/25 0444)    I/O:   Last BM Date: 01/29/25;      Dietary Orders (From admission, onward)       Start     Ordered    01/29/25 0958  Enteral feeding with NPO Pivot 1.5; PEG (percutaneous endoscopic gastric); 10  Diet effective now        Question Answer Comment   Tube feeding formula: Pivot 1.5    Feeding route: PEG (percutaneous endoscopic gastric)    Tube feeding continuous rate (mL/hr): 10        01/29/25 0958    01/29/25 0428  May Participate in Room Service  ( ROOM SERVICE MAY PARTICIPATE)  Once        Question:  .  Answer:  Yes    01/29/25 0427                     Estimated Needs:   Total Energy Estimated Needs in 24 hours (kCal):  (~1873)  Method for Estimating Needs: MSJ 1441 x 1.3  Total Protein Estimated Needs in 24 Hours (g):  (87+)  Method for Estimating 24 Hour Protein Needs: ABW x 1.5+  Total Fluid Estimated Needs in 24 Hours (mL):  (~1800 or per MD)           Nutrition Diagnosis   Malnutrition " Diagnosis  Patient has Malnutrition Diagnosis:  (unable to determine)    Nutrition Diagnosis  Patient has Nutrition Diagnosis: Yes  Diagnosis Status (1): New  Nutrition Diagnosis 1: Increased nutrient needs  Related to (1): increased metabolic demand  As Evidenced by (1): s/p direct laryngoscopy, trach, PEG, Free flap       Nutrition Interventions/Recommendations   Nutrition prescription for enteral nutrition    Nutrition Recommendations:  Individualized Nutrition Prescription Provided for : :    1. When appropriate, start Pivot 1.5 @ 15mL/hr. Advance by 10mL q 6-8hrs to goal rate of 50mL/hr      - FWF: 225mL 4x/day or per MD      - Monitor RFP + Mg for s/s refeeding; hold TF at rate and replete lytes, if low, prior to advancing      - Provides: 1800 kcals,112 g protein, 900mL water    2. After pt tolerates continuous goal volume, transition to bolus feeds      - Pivot 1.5 bolus @300 mL 4x/day      - FWF: 60mL pre/post feed with additional 210mL BID    3. Prior to discharge, transition to standard fiber containing TF      - Isosource 1.5 bolus @ 300mL 4x/day  (~4-5 cans per day)       - FWF: 60mL pre/post feed with additional 200mL BID      - Provides:1200 mL,  1800kcals, 82g pro, 916mL free water    Nutrition Interventions/Goals:   Interventions: Enteral intake            Nutrition Monitoring and Evaluation   Food/Nutrient Related History Monitoring  Monitoring and Evaluation Plan: Enteral and parenteral nutrition intake determination  Enteral and Parenteral Nutrition Intake Determination: Enteral nutrition intake - Prevent refeeding    Anthropometric Measurements  Monitoring and Evaluation Plan: Body weight  Body Weight: Body weight - Maintain stable weight    Biochemical Data, Medical Tests and Procedures  Monitoring and Evaluation Plan: Electrolyte/renal panel, Glucose/endocrine profile  Electrolyte and Renal Panel: Electrolytes within normal limits  Glucose/Endocrine Profile: Glucose within normal limits  ( mg/dL)    Physical Exam Findings  Monitoring and Evaluation Plan: Skin    Goal Status: New goal(s) identified    Time Spent (min): 60 minutes

## 2025-01-30 NOTE — NURSING NOTE
ENT note: Patient has severe anxiety, not receptive to teaching throughout day. RN demonstrated trach care and suctioning as well as PEG tube care. Patient did not participate in care.   Will continue to try to educate.     Rissa Lee RN

## 2025-01-30 NOTE — PROGRESS NOTES
SUPPORTIVE AND PALLIATIVE ONCOLOGY PROGRESS NOTE    SERVICE DATE: 2025    SUBJECTIVE:  HISTORY OF PRESENT ILLNESS: Sim Guaman is a 44 y.o. 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 PMHx significant for anxiety, hypothyroidism, and arthritis. Now s/p DL, tracheostomy, PEG, pharyngectomy, R parasymphyseal mandibulotomy with recon bar, bilat SND 1-4, excision of R retropharyngeal LN, recon with L RFFF, and STSG on 25 with with Dr. Mclaughlin and Dr. Ferguson. Supportive and Palliative Oncology is following for assistance with pain and anxiety management.     Interval Events:  Pt's pain and anxiety better controlled than yesterday. However, pt states his pain could still use some improvement. This is a reasonable finding given pt's prior home opioid regimen prior to surgery [oxycodone IR 15mg q4h PRN] and likely existing opioid tolerance.     Pain Assessment:  Location: Bilateral and anterior neck  Duration: Constant  Characteristics:  Ratin/10  Descriptors: Aching, throbbing, sharp, poking  Aggravating: Movement, swallowing, coughing   Relieving: Analgesics--PCA, positioning, and modifying activity  Intolerances: None  Interference with Function: Somewhat     Opioid Requirements  Past 24h opioid requirements:  (-, 6230-2702)  hydromorphone PCA in-person review:   Demand: 123, Received: 68, Total Dru.29mg IV = 316.13 OME    Total 24h OME use: 316.13 OME    Note: OME calculations based on equianalgesic table below. Please note this table is based on best available evidence but conversions are still approximate. These are NOT opioid DOSES for individual patient use; this is equivalency information.  Drug Parenteral Enteral   Morphine 10 25   Oxycodone N/A 20   Hydromorphone 2 5   Fentanyl 0.15 N/A   Tramadol N/A 120   Citation: Shama CARRASCO. Demystifying opioid conversion calculations: A guide for effective dosing, Second edition. MD Blank:  American [employer] of [employer]System Pharmacists, 2018.    Symptom Assessment:  Anxiety: a little--much improved compared to 1/30  Nausea: none  Constipation: none    Information obtained from: chart review, interview of patient, and discussion with primary team  ______________________________________________________________________   Objective     Lab Results   Component Value Date    WBC 25.6 (H) 01/29/2025    HGB 8.9 (L) 01/29/2025    HCT 30.5 (L) 01/29/2025    MCV 97 01/29/2025     01/29/2025      Lab Results   Component Value Date    GLUCOSE 92 01/29/2025    CALCIUM 8.1 (L) 01/29/2025     01/29/2025    K 3.5 01/29/2025    CO2 25 01/29/2025     01/29/2025    BUN 6 01/29/2025    CREATININE 0.70 01/29/2025     Lab Results   Component Value Date    ALT 14 01/10/2025    AST 28 01/10/2025    ALKPHOS 72 01/10/2025    BILITOT 0.2 01/10/2025     Estimated Creatinine Clearance: 112.4 mL/min (by C-G formula based on SCr of 0.7 mg/dL).     Scheduled medications   acetaminophen, 1,000 mg, g-tube, q8h ELANA  aspirin, 325 mg, g-tube, Daily  bacitracin, , Topical, TID  enoxaparin, 40 mg, subcutaneous, q24h  esomeprazole, 40 mg, g-tube, Daily before breakfast  gabapentin, 300 mg, g-tube, Nightly  hydrogen peroxide, 1 Application, Topical, TID  oxygen, , inhalation, Continuous - Inhalation  polyethylene glycol, 17 g, g-tube, Daily  senna, 10 mL, g-tube, BID  white petrolatum, 1 Application, Topical, TID    Continuous medications  HYDROmorphone,   sodium chloride 0.9%, 70 mL/hr, Last Rate: 70 mL/hr (01/30/25 0444)    PRN medications  clonazePAM, 1 mg, 4x daily PRN  naloxone, 0.2 mg, q5 min PRN  ondansetron, 4 mg, q8h PRN    PHYSICAL EXAMINATION:  Vital Signs:   Vital signs reviewed  Vitals:    01/30/25 0900   BP: 102/82   Pulse: 66   Resp: 18   Temp: 36 °C (96.8 °F)   SpO2: 96%     Pain Score: 5 - Moderate pain    Physical Exam  Vitals reviewed.   Constitutional:       Comments: Alert, ill appearing gentleman  sitting up in chair. No acute signs of distress. Pleasant, cooperative, and participating in interview.  HENT:      Head:      Comments: Normocephalic, bilateral jaw line with mild non-pitting edema.   Eyes:      Comments: Sclera clear, EOM intact.    Neck:      Comments: Tracheostomy midline. No evidence of drainage or bleeding at tracheostomy site. X3 PREM drains visualized.   Pulmonary:      Comments: Symmetrical chest rise. Regular rate and depth of respirations. Room air via trache.   Abdominal:      Comments: Abdomen non distended, non tender, and soft. PEG present.    Musculoskeletal:      Comments: Normal muscle strength and tone. ADAMS x4. No visible extremity edema.   Skin:     Comments: No lesions, rash, or abrasions present on visible skin. Skin color appropriate for ethnicity. L forearm wound vac in place.    Neurological:      Comments: A&Ox4, follows commands, no apparent sensory deficits.   Psychiatric:      Comments: Mood and behavior appropriate.     ASSESSMENT/PLAN:  Sim Guaman is a 44 y.o. 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 PMHx significant for anxiety, hypothyroidism, and arthritis. Now s/p DL, tracheostomy, PEG, pharyngectomy, R parasymphyseal mandibulotomy with recon bar, bilat SND 1-4, excision of R retropharyngeal LN, recon with L RFFF, and STSG on 1/28/25 with with Dr. Mclaughlin and Dr. Ferguson. Supportive and Palliative Oncology is following for assistance with pain and anxiety management.      Neoplasm Related Pain  Post Operative Pain  Bilateral and anterior neck pain related to known malignancy. C/b accompanying headaches.   Acute post-operative pain 2/2 DL, tracheostomy, PEG, pharyngectomy, R parasymphyseal mandibulotomy with recon bar, bilat SND 1-4, excision of R retropharyngeal LN, recon with L RFFF, and STSG on 1/28/25.  Type: Somatic, possibly neuropathic  Pain control: Fairly controlled  Home regimen: acetaminophen,  oxycodone IR 15mg q4h PRN   Intolerances: None  Previously tried: hydromorphone IR PO, Percocet  Total OME usage for the past 24 hours:  316.13 OME  Reviewed: (1/10/25) Hepatic function historically WNL.  Renal function WNL.   Change hydromorphone PCA settings:  Patient Bolus Dose: 0.6mg  Lockout Interval: 10 minutes  Basal Rate: NONE  Once Hour Dose Limit: 3.6mg   Continue scheduled acetaminophen 1g oral liquid via PEG q8h  Continue gabapentin 300mg PO once daily HS--will assist with neuropathic pain and possibly aide mood  Continue to monitor pain scores and administer PRN medications as appropriate  Continue/initiate nonpharmacologic pain management strategies including ice/heat therapy, distraction techniques, deep breathing/relaxation techniques, calming music, and repositioning  Continue to monitor for signs of opioid efficacy (pain scores, improved functionality) and toxicity (pinpoint pupils, excess sedation/drowsiness/confusion, respiratory depression, etc.)  Likely exacerbated by uncontrolled anxiety, see recs     Nausea  At risk for nausea and vomiting related to recent surgery and opioid use   Home regimen: None  No recent EKG on file in EMR.  PPI per primary   Continue ondansetron 4mg IV q8h PRN for n/v, first line     Constipation  At risk for constipation related to recent surgery and opioid use, currently not constipated.  Usual bowel pattern: Every day  Home regimen: None  LBM: 1/29/25 AM per pt  Continue scheduled Miralax 17g via PEG once daily  Continue scheduled senna syrup 10ml via PEG BID  Goal to have BM without straining q48-72h, adjust regimen as needed  Encourage mobility as tolerated, PT/OT following     Altered Mood  Acute on chronic anxiety related to pain and hospitalization.   Sub-optimally controlled.   Home regimen: clonazepam 1mg q6h PRN, had not been taking  buspirone at home  Continue clonazepam 1mg via PEG q6h PRN for anxiety  If need arises, consider consulting Psych for anxiety  management and regimen optimization  Spiritual Care following for support     Altered Nutrition  Altered nutrition related to malignancy, recent surgery s/p trache/PEG, new TF initiation.   Weight loss: None  Home regimen: None  Consider consulting Nutrition if needed  Currently on trickle TF--tolerating     Medical Decision Making/Goals of Care/Advance Care Planning:  (1/29/25) Complete GOC/ACP planning not completed due to uncontrolled pain and emotional symptom burden. Confirmed surrogate decision maker and Full Code status with pt.      Advance Directives  Existence of Advance Directives: None  Decision maker: Surrogate decision maker is pt's wifeCorinne ([phone])  Code Status: Full Code     Disposition:  Please start the process of having prior authorization with meds to beds deliver medications to patient prior to discharge via [facility]. Prescriptions will need to be sent 48-72 hours prior to discharge so that a prior authorization can be completed.      Discharge date pending resolution of acute hospital issues and symptom control.  Will determine if pt would like to pursue appointment with outpatient Supportive Oncology. Per discussion with primary team, due to geographic location of pt's home, likely that pt will continue following with his existing PCP for pain and symptom management after discharge. PCP was previously managing pt's pain/symptom regimen.     Supportive and Palliative Oncology encounter:  Spoke with patient at bedside.  Emotional support provided.  Coordination of care: medication and symptom re-evaluation    Signature and billing:  Medical complexity was high level due to due to complexity of problems, extensive data review, and high risk of management/treatment.    I spent 50 minutes in the care of this patient which included chart review, interviewing patient/family, discussion with primary team, coordination of care, and documentation.    DATA   Diagnostic tests and  information reviewed for today's visit:  Conversation with primary team, Most recent labs, Most recent EKG [acknowledged none on file], Medications     Some elements copied from my note on 1/30/25, the elements have been updated and all reflect current decision making from today, 1/30/2025.    Plan of Care discussed with: Primary team, pt     Thank you for asking Supportive and Palliative Oncology to assist with care of this patient.  Recommendations will be communicated back to the consulting service by way of shared electronic medical record/secure chat/email or face-to-face.   We will continue to follow.  Please contact us for additional questions or concerns.    SIGNATURE: SHEY Gomes-SUMAN  PAGER/CONTACT:  Contact information:  Supportive and Palliative Oncology  Monday-Friday 8 AM-5 PM  Epic Secure chat or pager [phone].  After hours and weekends:  pager [phone]

## 2025-01-30 NOTE — PROGRESS NOTES
Physical Therapy                 Therapy Communication Note    Patient Name: Sim Guaman  MRN: [id number]  Department: [facility]  Room: [address] [place] [address]-  Today's Date: 1/30/2025     Discipline: Physical Therapy    PT Missed Visit: Yes     Missed Visit Reason: Missed Visit Reason:  (Pt had just gotten washed up and deferring mobility at this time. Will re-attempt as able.)    Missed Time: Attempt

## 2025-01-30 NOTE — CARE PLAN
The patient's goals for the shift include      The clinical goals for the shift include patietn will remain safe throughout shift      Problem: Pain - Adult  Goal: Verbalizes/displays adequate comfort level or baseline comfort level  Outcome: Progressing     Problem: Safety - Adult  Goal: Free from fall injury  Outcome: Progressing     Problem: Discharge Planning  Goal: Discharge to home or other facility with appropriate resources  Outcome: Progressing     Problem: Chronic Conditions and Co-morbidities  Goal: Patient's chronic conditions and co-morbidity symptoms are monitored and maintained or improved  Outcome: Progressing     Problem: Nutrition  Goal: Nutrient intake appropriate for maintaining nutritional needs  Outcome: Progressing     Problem: Pain  Goal: Takes deep breaths with improved pain control throughout the shift  Outcome: Progressing  Goal: Turns in bed with improved pain control throughout the shift  Outcome: Progressing  Goal: Walks with improved pain control throughout the shift  Outcome: Progressing  Goal: Performs ADL's with improved pain control throughout shift  Outcome: Progressing  Goal: Participates in PT with improved pain control throughout the shift  Outcome: Progressing  Goal: Free from opioid side effects throughout the shift  Outcome: Progressing  Goal: Free from acute confusion related to pain meds throughout the shift  Outcome: Progressing     Problem: Fall/Injury  Goal: Not fall by end of shift  Outcome: Progressing  Goal: Be free from injury by end of the shift  Outcome: Progressing     Problem: Respiratory  Goal: Clear secretions with interventions this shift  Outcome: Progressing  Goal: Minimize anxiety/maximize coping throughout shift  Outcome: Progressing  Goal: No signs of respiratory distress (eg. Use of accessory muscles. Peds grunting)  Outcome: Progressing  Goal: Patent airway maintained this shift  Outcome: Progressing  Goal: Wean oxygen to maintain O2 saturation per  order/standard this shift  Outcome: Progressing  Goal: Increase self care and/or family involvement in next 24 hours  Outcome: Progressing

## 2025-01-30 NOTE — PROGRESS NOTES
"  Ear Nose & Throat Progress Note         Sim Guaman is a 44 y.o. male on day 2 of admission presenting with Cancer of soft palate (Multi). No acute events overnight. Flap checks stable.    Subjective   Patient this morning on rounds was noticeably less anxious, less agitated, and less aggressive this morning.  He is agreed to allow the nursing team to start his enteral feeds today.  Will continue to have supportive oncology follow to help with pain and anxiety management.  Patient also had a visit this morning by Naomi, the [employer] therapy dog,; he enjoyed and had a pleasant interaction with her.       Objective   Physical Exam  Vitals reviewed in EMR  Gen: NAD, AOx3, resting comfortably in bed  Eyes: EOMI, sclera clear, PERRL  Ears: Normal external ears bilaterally  Nose: No rhinorrhea; anterior nares clear  Oral Cavity: All intraoral incisions c/d/i without evidence of dehiscence  -Flap soft and pink with good capillary refill  Head: normocephalic, atraumatic  Face/Neck:All incisions c/d/i, neck soft and flat without evidence of hematoma or fluid collection  -Internal doppler with strong arterial signal  -Trach in midline position without bleeding or thick tracheal secretions  Resp: 6.0 cuffed Shiley, cuff up, no labored breathing on   Cards: RRR on Doppler  Gastro: Soft, non-distended,  PEG tube in place  : Voiding clear yellow urine  MSK: left arm warm with intact movement and sensation  -left thigh skin graft donor site with tegaderm in place  Psych: Appropriate mood and affect  Drains: All drains in place and holding suction with serosanguinous drainage (stripped)    Last Recorded Vitals  Blood pressure 120/73, pulse 68, temperature 36 °C (96.8 °F), resp. rate 18, height 1.702 m (5' 7\"), weight 58.4 kg (128 lb 12 oz), SpO2 100%.  Intake/Output last 3 Shifts:  I/O last 3 completed shifts:  In: 9097.9 (155.8 mL/kg) [I.V.:7350.9 (125.9 mL/kg); NG/GT:540; IV Piggyback:1207]  Out: 4700 (80.5 mL/kg) " [Urine:3645 (1.7 mL/kg/hr); Drains:855; Blood:200]  Weight: 58.4 kg     Relevant Results  Results for orders placed or performed during the hospital encounter of 01/28/25 (from the past 24 hours)   POCT GLUCOSE   Result Value Ref Range    POCT Glucose 118 (H) 74 - 99 mg/dL   Renal Function Panel   Result Value Ref Range    Glucose 92 74 - 99 mg/dL    Sodium 136 136 - 145 mmol/L    Potassium 3.5 3.5 - 5.3 mmol/L    Chloride 101 98 - 107 mmol/L    Bicarbonate 25 21 - 32 mmol/L    Anion Gap 14 10 - 20 mmol/L    Urea Nitrogen 6 6 - 23 mg/dL    Creatinine 0.70 0.50 - 1.30 mg/dL    eGFR >90 >60 mL/min/1.73m*2    Calcium 8.1 (L) 8.6 - 10.6 mg/dL    Phosphorus 1.8 (L) 2.5 - 4.9 mg/dL    Albumin 3.0 (L) 3.4 - 5.0 g/dL   CBC   Result Value Ref Range    WBC 24.1 (H) 4.4 - 11.3 x10*3/uL    nRBC 0.0 0.0 - 0.0 /100 WBCs    RBC 2.77 (L) 4.50 - 5.90 x10*6/uL    Hemoglobin 7.8 (L) 13.5 - 17.5 g/dL    Hematocrit 23.6 (L) 41.0 - 52.0 %    MCV 85 80 - 100 fL    MCH 28.2 26.0 - 34.0 pg    MCHC 33.1 32.0 - 36.0 g/dL    RDW 23.4 (H) 11.5 - 14.5 %    Platelets 314 150 - 450 x10*3/uL   Magnesium   Result Value Ref Range    Magnesium 1.62 1.60 - 2.40 mg/dL   TSH   Result Value Ref Range    Thyroid Stimulating Hormone 1.43 0.44 - 3.98 mIU/L   Prealbumin   Result Value Ref Range    Prealbumin 9.3 (L) 18.0 - 40.0 mg/dL   CBC   Result Value Ref Range    WBC 25.6 (H) 4.4 - 11.3 x10*3/uL    nRBC 0.0 0.0 - 0.0 /100 WBCs    RBC 3.15 (L) 4.50 - 5.90 x10*6/uL    Hemoglobin 8.9 (L) 13.5 - 17.5 g/dL    Hematocrit 30.5 (L) 41.0 - 52.0 %    MCV 97 80 - 100 fL    MCH 28.3 26.0 - 34.0 pg    MCHC 29.2 (L) 32.0 - 36.0 g/dL    RDW 25.2 (H) 11.5 - 14.5 %    Platelets 276 150 - 450 x10*3/uL   POCT GLUCOSE   Result Value Ref Range    POCT Glucose 115 (H) 74 - 99 mg/dL   POCT GLUCOSE   Result Value Ref Range    POCT Glucose 106 (H) 74 - 99 mg/dL        Scheduled medications  acetaminophen, 1,000 mg, g-tube, q8h ELANA  aspirin, 325 mg, g-tube, Daily  bacitracin, ,  Topical, TID  enoxaparin, 40 mg, subcutaneous, q24h  esomeprazole, 40 mg, g-tube, Daily before breakfast  gabapentin, 300 mg, g-tube, Nightly  hydrogen peroxide, 1 Application, Topical, TID  oxygen, , inhalation, Continuous - Inhalation  polyethylene glycol, 17 g, g-tube, Daily  senna, 10 mL, g-tube, BID  white petrolatum, 1 Application, Topical, TID      Continuous medications  HYDROmorphone,   sodium chloride 0.9%, 70 mL/hr, Last Rate: 70 mL/hr (01/30/25 8544)      PRN medications  PRN medications: clonazePAM, naloxone, ondansetron                         Assessment/Plan   Assessment & Plan  Cancer of soft palate (Multi)    Assessment:  Sim Guaman is a 44 year old male with a diagnosis of Oropharyngeal SCC with known neck metastasis statu post Direct Laryngoscopy, tracheotomy, PEG (Akil), pharyngectomy, right parasymphyseal mandibulotomy with recon bar, bilateral Selective Neck Dissection levels I-IV, excision of R retropharyngeal Lymph Node, reconconstruction with Left Radial Forearm Free Flap, and Split Thickness Skin Graft on 1/29/2025  with Dr. Mclaughlin and Dr. Ferguson.      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  Cervical Lymph Node Mets  Nicotine Dependence  Acute Blood Loss Anemia  Dysphagia  Severe Anxiety  Agitation  Tachycardia, self resolves when patient is clam  Hypokalemia  Hypomagnesia  Hypophosphatemia  Acute  on Chronic Pain         Plan:  -Flap Checks: q6hrs ENT team checks and nursing per flap protocol  -Drains: Monitor output  -Analgesia:  Dilaudid PCA, Scheduled Acetaminophen, Gabapentin 300mg Hs  -Neuro: Restarted home clonazepam, Consult Supportive Oncology   -FEN: mIVFs, NPO, TF @ trickle rate; monitor and replete electrolytes as needed  -Pulm: s/p trach, humidified air at all times, standard trach care/suctioning/teaching; wean oxygen to room air, Keep cuff up for 5 days  -ID: Unasyn 3g Q6hrs x 1day, stop date 1/29->Complete  -Cardiac: Vitals per ENT free flap protocol then  transition to Q4hr vitals; Aspirin 325mg daily  -Heme: Trending H/H  -Endo: No current interventions  -GI: Bowel regimen, PPI,  and PRN anti-emetic  -: Abrams catheter discontinued, afternoon void check->voiding  -Steroids/Special: Incision and oral care per ENT free flap protocol  -Embolic PPx: SQLovenox, SCDs while in bed  -Dispo: [facility]. PT/OT ordered; Discharge planning for POD 6 home with home care vs skilled nursing facility              -Dispo planning will difficult secondary to patient's insurance & location; it is true for both Home care & SNF        All plans discussed with attendings after morning rounds.       I spent 45 minutes in the professional and overall care of this patient.      Ligia Gordon APRN, CNP  Certified Family Nurse Practitioner  Nurse Practitioner III  Department of Otolaryngology: Head & Neck Surgery  Personal Pager [phone]  ENT Team  Head and Neck Phone: [phone]

## 2025-01-30 NOTE — SIGNIFICANT EVENT
"ENT Flap Check  Subjective:  Resting comfortably in chair.       Physical Examination\"  Vitals: Temp:  [35.6 °C (96.1 °F)-37.4 °C (99.3 °F)] 36.3 °C (97.3 °F)  Heart Rate:  [58-90] 61  Resp:  [12-20] 12  BP: ()/(44-86) 98/64  FiO2 (%):  [28 %-35 %] 28 %  Gen: NAD, resting comfortably in chair  Face/Neck: All incisions c/d/i, neck soft and flat without evidence of hematoma or fluid collection  -Internal doppler with strong arterial signal  -Trach in midline position without bleeding or thick tracheal secretions  Oral Cavity: All intraoral incisions c/d/i without evidence of dehiscence  -Flap soft and pink with good capillary refill  Extremities: left arm warm with intact movement and sensation  -left thigh skin graft donor site with tegaderm in place  Abdomen: PEG in place  Drains: All drains in place and holding suction with serosanguinous drainage (stripped)    Assessment:  S/p DL, trach, PEG (Akil), pharyngectomy, right parasymphyseal mandibulotomy with recon bar, bilat SND 1-4, excision of R retropharyngeal LN, recon with L RFFF, STSG 1/29 with Dr. Mclaughlin and Dr. Ferguson.     Plan:  -Continue q6hr flap checks      Delmi Nair MD - PGY1  Otolaryngology - Head & Neck Surgery    ENT Consult pager: [phone]  ENT Peds pager: [phone]  ENT Head & Neck Surgery Phone: [phone]  ENT Subspecialty team (otology, rhinology, laryngology, plastics, sleep):   M-F 6am-5pm- EpicChat or page the resident who wrote the daily note   Nights & weekends- [phone]  ENT Outpatient scheduling number: [phone]  Please Page [phone] or call [phone] if urgent      "

## 2025-01-30 NOTE — SIGNIFICANT EVENT
"ENT Flap Check  Subjective:  Resting comfortably in chair.       Physical Examination\"  Vitals: Temp:  [35.6 °C (96.1 °F)-36.6 °C (97.9 °F)] 36.3 °C (97.3 °F)  Heart Rate:  [58-87] 68  Resp:  [12-18] 16  BP: ()/(44-86) 104/59  FiO2 (%):  [21 %-28 %] 21 %  Gen: NAD, resting comfortably in chair  Face/Neck: All incisions c/d/i, neck soft and flat without evidence of hematoma or fluid collection  -Internal doppler with strong arterial signal  -Trach in midline position without bleeding or thick tracheal secretions  Oral Cavity: All intraoral incisions c/d/i without evidence of dehiscence  -Flap soft and pink with good capillary refill  Extremities: left arm warm with intact movement and sensation  -left thigh skin graft donor site with tegaderm in place  Abdomen: PEG in place  Drains: All drains in place and holding suction with serosanguinous drainage (stripped)    Assessment:  S/p DL, trach, PEG (Akil), pharyngectomy, right parasymphyseal mandibulotomy with recon bar, bilat SND 1-4, excision of R retropharyngeal LN, recon with L RFFF, STSG 1/29 with Dr. Mclaughlin and Dr. Ferguson.     Plan:  -Continue q6hr flap checks      Tamika Mcmanus MD - PGY2  Otolaryngology - Head & Neck Surgery  [facility]    I am the night resident. I can only be contacted from 5 PM to 6 AM. Page on weekends or off hours.   ENT Consult pager: [phone]  ENT Peds pager: [phone]  ENT Head & Neck Surgery Phone: [phone]  ENT subspecialty team: Mick individual resident who wrote today's note  ENT Outpatient scheduling number: [phone]  Please Page [phone] or call [phone] if Urgent        "

## 2025-01-30 NOTE — SIGNIFICANT EVENT
"ENT Flap Check  Subjective:  Resting comfortably in chair.       Physical Examination\"  Vitals: Temp:  [35.6 °C (96.1 °F)-37.4 °C (99.3 °F)] 36.6 °C (97.9 °F)  Heart Rate:  [] 82  Resp:  [12-24] 16  BP: ()/(44-86) 108/57  FiO2 (%):  [28 %-35 %] 28 %  Gen: NAD, resting comfortably in chair  Face/Neck: All incisions c/d/i, neck soft and flat without evidence of hematoma or fluid collection  -Internal doppler with strong arterial signal  -Trach in midline position without bleeding or thick tracheal secretions  Oral Cavity: All intraoral incisions c/d/i without evidence of dehiscence  -Flap soft and pink with good capillary refill  Extremities: left arm warm with intact movement and sensation  -left thigh skin graft donor site with tegaderm in place  Abdomen: PEG in place  Drains: All drains in place and holding suction with serosanguinous drainage (stripped)    Assessment:  S/p DL, trach, PEG (Akil), pharyngectomy, right parasymphyseal mandibulotomy with recon bar, bilat SND 1-4, excision of R retropharyngeal LN, recon with L RFFF, STSG 1/29 with Dr. Mclaughlin and Dr. Ferguson.     Plan:  -Continue q6hr flap checks      Tamika Mcmanus MD - PGY2  Otolaryngology - Head & Neck Surgery  [facility]    I am the night resident. I can only be contacted from 5 PM to 6 AM. Page on weekends or off hours.   ENT Consult pager: [phone]  ENT Peds pager: [phone]  ENT Head & Neck Surgery Phone: [phone]  ENT subspecialty team: Mick individual resident who wrote today's note  ENT Outpatient scheduling number: [phone]  Please Page [phone] or call [phone] if Urgent        "

## 2025-01-31 LAB
ALBUMIN SERPL BCP-MCNC: 3 G/DL (ref 3.4–5)
ANION GAP SERPL CALC-SCNC: 10 MMOL/L (ref 10–20)
BUN SERPL-MCNC: 8 MG/DL (ref 6–23)
CALCIUM SERPL-MCNC: 8 MG/DL (ref 8.6–10.6)
CHLORIDE SERPL-SCNC: 106 MMOL/L (ref 98–107)
CO2 SERPL-SCNC: 26 MMOL/L (ref 21–32)
CREAT SERPL-MCNC: 0.53 MG/DL (ref 0.5–1.3)
EGFRCR SERPLBLD CKD-EPI 2021: >90 ML/MIN/1.73M*2
ERYTHROCYTE [DISTWIDTH] IN BLOOD BY AUTOMATED COUNT: 24.2 % (ref 11.5–14.5)
GLUCOSE SERPL-MCNC: 99 MG/DL (ref 74–99)
HCT VFR BLD AUTO: 23.1 % (ref 41–52)
HGB BLD-MCNC: 7.5 G/DL (ref 13.5–17.5)
MAGNESIUM SERPL-MCNC: 1.94 MG/DL (ref 1.6–2.4)
MCH RBC QN AUTO: 28.2 PG (ref 26–34)
MCHC RBC AUTO-ENTMCNC: 32.5 G/DL (ref 32–36)
MCV RBC AUTO: 87 FL (ref 80–100)
NRBC BLD-RTO: 0 /100 WBCS (ref 0–0)
PHOSPHATE SERPL-MCNC: 2.6 MG/DL (ref 2.5–4.9)
PLATELET # BLD AUTO: 327 X10*3/UL (ref 150–450)
POTASSIUM SERPL-SCNC: 3.6 MMOL/L (ref 3.5–5.3)
RBC # BLD AUTO: 2.66 X10*6/UL (ref 4.5–5.9)
SODIUM SERPL-SCNC: 138 MMOL/L (ref 136–145)
WBC # BLD AUTO: 10.8 X10*3/UL (ref 4.4–11.3)

## 2025-01-31 PROCEDURE — 2500000001 HC RX 250 WO HCPCS SELF ADMINISTERED DRUGS (ALT 637 FOR MEDICARE OP): Mod: SE | Performed by: STUDENT IN AN ORGANIZED HEALTH CARE EDUCATION/TRAINING PROGRAM

## 2025-01-31 PROCEDURE — 83735 ASSAY OF MAGNESIUM: CPT | Performed by: STUDENT IN AN ORGANIZED HEALTH CARE EDUCATION/TRAINING PROGRAM

## 2025-01-31 PROCEDURE — 2060000001 HC INTERMEDIATE ICU ROOM DAILY

## 2025-01-31 PROCEDURE — 97530 THERAPEUTIC ACTIVITIES: CPT | Mod: GP

## 2025-01-31 PROCEDURE — 2500000004 HC RX 250 GENERAL PHARMACY W/ HCPCS (ALT 636 FOR OP/ED): Mod: SE | Performed by: NURSE PRACTITIONER

## 2025-01-31 PROCEDURE — 2500000004 HC RX 250 GENERAL PHARMACY W/ HCPCS (ALT 636 FOR OP/ED): Mod: SE

## 2025-01-31 PROCEDURE — 2500000001 HC RX 250 WO HCPCS SELF ADMINISTERED DRUGS (ALT 637 FOR MEDICARE OP): Mod: SE | Performed by: NURSE PRACTITIONER

## 2025-01-31 PROCEDURE — 99232 SBSQ HOSP IP/OBS MODERATE 35: CPT | Performed by: NURSE PRACTITIONER

## 2025-01-31 PROCEDURE — 2500000005 HC RX 250 GENERAL PHARMACY W/O HCPCS: Mod: SE | Performed by: STUDENT IN AN ORGANIZED HEALTH CARE EDUCATION/TRAINING PROGRAM

## 2025-01-31 PROCEDURE — 97116 GAIT TRAINING THERAPY: CPT | Mod: GP

## 2025-01-31 PROCEDURE — 2500000004 HC RX 250 GENERAL PHARMACY W/ HCPCS (ALT 636 FOR OP/ED): Mod: SE | Performed by: STUDENT IN AN ORGANIZED HEALTH CARE EDUCATION/TRAINING PROGRAM

## 2025-01-31 PROCEDURE — 85027 COMPLETE CBC AUTOMATED: CPT | Performed by: STUDENT IN AN ORGANIZED HEALTH CARE EDUCATION/TRAINING PROGRAM

## 2025-01-31 PROCEDURE — 80069 RENAL FUNCTION PANEL: CPT | Performed by: STUDENT IN AN ORGANIZED HEALTH CARE EDUCATION/TRAINING PROGRAM

## 2025-01-31 PROCEDURE — 36415 COLL VENOUS BLD VENIPUNCTURE: CPT | Performed by: STUDENT IN AN ORGANIZED HEALTH CARE EDUCATION/TRAINING PROGRAM

## 2025-01-31 PROCEDURE — 2500000004 HC RX 250 GENERAL PHARMACY W/ HCPCS (ALT 636 FOR OP/ED): Mod: JZ,SE | Performed by: NURSE PRACTITIONER

## 2025-01-31 PROCEDURE — 99233 SBSQ HOSP IP/OBS HIGH 50: CPT

## 2025-01-31 RX ORDER — HYDROMORPHONE HYDROCHLORIDE 1 MG/ML
1 INJECTION, SOLUTION INTRAMUSCULAR; INTRAVENOUS; SUBCUTANEOUS EVERY 2 HOUR PRN
Status: DISCONTINUED | OUTPATIENT
Start: 2025-01-31 | End: 2025-02-05

## 2025-01-31 RX ORDER — OXYCODONE HCL 5 MG/5 ML
20 SOLUTION, ORAL ORAL
Status: DISCONTINUED | OUTPATIENT
Start: 2025-01-31 | End: 2025-01-31

## 2025-01-31 RX ORDER — SODIUM,POTASSIUM PHOSPHATES 280-250MG
1 POWDER IN PACKET (EA) ORAL 4 TIMES DAILY
Status: DISPENSED | OUTPATIENT
Start: 2025-01-31 | End: 2025-01-31

## 2025-01-31 RX ORDER — OXYCODONE HCL 5 MG/5 ML
25 SOLUTION, ORAL ORAL
Status: DISCONTINUED | OUTPATIENT
Start: 2025-01-31 | End: 2025-02-06 | Stop reason: HOSPADM

## 2025-01-31 RX ORDER — OXYCODONE HCL 5 MG/5 ML
15 SOLUTION, ORAL ORAL
Status: DISCONTINUED | OUTPATIENT
Start: 2025-01-31 | End: 2025-02-06 | Stop reason: HOSPADM

## 2025-01-31 RX ORDER — POTASSIUM CHLORIDE 1.5 G/1.58G
40 POWDER, FOR SOLUTION ORAL ONCE
Status: COMPLETED | OUTPATIENT
Start: 2025-01-31 | End: 2025-01-31

## 2025-01-31 RX ADMIN — PETROLATUM 1 APPLICATION: 1 OINTMENT TOPICAL at 15:16

## 2025-01-31 RX ADMIN — ESOMEPRAZOLE MAGNESIUM 40 MG: 40 FOR SUSPENSION ORAL at 05:37

## 2025-01-31 RX ADMIN — ASPIRIN 325 MG ORAL TABLET 325 MG: 325 PILL ORAL at 09:27

## 2025-01-31 RX ADMIN — OXYCODONE HYDROCHLORIDE 25 MG: 5 SOLUTION ORAL at 18:32

## 2025-01-31 RX ADMIN — Medication: at 07:48

## 2025-01-31 RX ADMIN — ACETAMINOPHEN 1000 MG: 160 SOLUTION ORAL at 21:44

## 2025-01-31 RX ADMIN — CLONAZEPAM 1 MG: 0.5 TABLET ORAL at 17:08

## 2025-01-31 RX ADMIN — CLONAZEPAM 1 MG: 0.5 TABLET ORAL at 05:37

## 2025-01-31 RX ADMIN — OXYCODONE HYDROCHLORIDE 25 MG: 5 SOLUTION ORAL at 14:55

## 2025-01-31 RX ADMIN — HYDROGEN PEROXIDE 1 APPLICATION: 30 SOLUTION TOPICAL at 15:16

## 2025-01-31 RX ADMIN — ACETAMINOPHEN 1000 MG: 160 SOLUTION ORAL at 05:37

## 2025-01-31 RX ADMIN — CLONAZEPAM 1 MG: 0.5 TABLET ORAL at 23:14

## 2025-01-31 RX ADMIN — OXYCODONE HYDROCHLORIDE 25 MG: 5 SOLUTION ORAL at 21:44

## 2025-01-31 RX ADMIN — SODIUM CHLORIDE 500 ML: 9 INJECTION, SOLUTION INTRAVENOUS at 03:06

## 2025-01-31 RX ADMIN — POTASSIUM & SODIUM PHOSPHATES POWDER PACK 280-160-250 MG 1 PACKET: 280-160-250 PACK at 14:43

## 2025-01-31 RX ADMIN — SENNA 10 ML: 417.12 SYRUP ORAL at 21:44

## 2025-01-31 RX ADMIN — HYDROMORPHONE HYDROCHLORIDE 1 MG: 1 INJECTION, SOLUTION INTRAMUSCULAR; INTRAVENOUS; SUBCUTANEOUS at 23:02

## 2025-01-31 RX ADMIN — Medication: at 01:49

## 2025-01-31 RX ADMIN — Medication 28 PERCENT: at 09:28

## 2025-01-31 RX ADMIN — ACETAMINOPHEN 1000 MG: 160 SOLUTION ORAL at 14:43

## 2025-01-31 RX ADMIN — POTASSIUM CHLORIDE 40 MEQ: 1.5 POWDER, FOR SOLUTION ORAL at 14:43

## 2025-01-31 RX ADMIN — HYDROMORPHONE HYDROCHLORIDE 1 MG: 1 INJECTION, SOLUTION INTRAMUSCULAR; INTRAVENOUS; SUBCUTANEOUS at 19:45

## 2025-01-31 RX ADMIN — HYDROGEN PEROXIDE 1 APPLICATION: 30 SOLUTION TOPICAL at 21:44

## 2025-01-31 RX ADMIN — HYDROGEN PEROXIDE 1 APPLICATION: 30 SOLUTION TOPICAL at 09:30

## 2025-01-31 RX ADMIN — ENOXAPARIN SODIUM 40 MG: 100 INJECTION SUBCUTANEOUS at 09:27

## 2025-01-31 RX ADMIN — HYDROMORPHONE HYDROCHLORIDE 1 MG: 1 INJECTION, SOLUTION INTRAMUSCULAR; INTRAVENOUS; SUBCUTANEOUS at 15:44

## 2025-01-31 RX ADMIN — HYDROMORPHONE HYDROCHLORIDE 1 MG: 1 INJECTION, SOLUTION INTRAMUSCULAR; INTRAVENOUS; SUBCUTANEOUS at 17:44

## 2025-01-31 RX ADMIN — CLONAZEPAM 1 MG: 0.5 TABLET ORAL at 09:34

## 2025-01-31 RX ADMIN — GABAPENTIN 300 MG: 250 SOLUTION ORAL at 21:44

## 2025-01-31 ASSESSMENT — ACTIVITIES OF DAILY LIVING (ADL): LACK_OF_TRANSPORTATION: YES

## 2025-01-31 ASSESSMENT — COGNITIVE AND FUNCTIONAL STATUS - GENERAL
MOBILITY SCORE: 18
TURNING FROM BACK TO SIDE WHILE IN FLAT BAD: A LITTLE
STANDING UP FROM CHAIR USING ARMS: A LITTLE
CLIMB 3 TO 5 STEPS WITH RAILING: A LITTLE
WALKING IN HOSPITAL ROOM: A LITTLE
MOVING FROM LYING ON BACK TO SITTING ON SIDE OF FLAT BED WITH BEDRAILS: A LITTLE
MOVING TO AND FROM BED TO CHAIR: A LITTLE

## 2025-01-31 ASSESSMENT — PAIN SCALES - GENERAL
PAINLEVEL_OUTOF10: 7
PAINLEVEL_OUTOF10: 10 - WORST POSSIBLE PAIN
PAINLEVEL_OUTOF10: 5 - MODERATE PAIN
PAINLEVEL_OUTOF10: 10 - WORST POSSIBLE PAIN
PAINLEVEL_OUTOF10: 7
PAINLEVEL_OUTOF10: 10 - WORST POSSIBLE PAIN
PAINLEVEL_OUTOF10: 8

## 2025-01-31 ASSESSMENT — PAIN - FUNCTIONAL ASSESSMENT
PAIN_FUNCTIONAL_ASSESSMENT: 0-10

## 2025-01-31 NOTE — OP NOTE
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]     Procedures    91119 - L Radial Forearm Free Flap, 15cm x 8cm    14287 - Palatoplasty    62206 - Pharyngoplasty    72718 - Vestibuloplasty    40919 - L thigh split thickness skin graft 15cm x 10cm    69500 - Complex Closure with Adjacent Tissue Transfer 15cm x 10cm    Application Wound Vac L Arm    Surgeons   Panel 1:     * Vivienne Mclaughlin - Primary  Panel 2:     * Susie Ferguson - Primary  Panel 3:     * Mani Barnard - Primary    Resident/Fellow/Other Assistant:  Surgeons and Role:  Panel 1:     * Kristy Overton MD - Resident - Assisting     * J Carlos Jang DO - Resident - Assisting     * Dick Miguel MD - Fellow  Panel 2:     * Dick Miguel MD - Assisting    Staff:   Circulator: Laura  Circulator: Berta  Scrub Person: [place]  Scrub Person: Wesly Patrick Scrub: Laura  Relief Circulator: Abi  Relief Circulator: Renee Patrick Scrub: Xin    Anesthesia Staff: Anesthesiologist: Sean Ramos MD; Faina Marte MD; Guy James MD  CRNA: Salvador Gates APRN-CRNA; SHEY Case-BEBO  C-AA: TRESSA Vasquez; TRESSA Pulido  Anesthesia Resident: Marshall Velásquez MD    Procedure Summary  Anesthesia: General  ASA: III  Estimated Blood Loss: 100mL  Intra-op Medications:   Administrations occurring from 0645 to 1820 on 25:   Medication Name Total Dose   sodium chloride 0.9 % irrigation solution 2,000 mL   lidocaine-epinephrine (Xylocaine W/EPI) 1 %-1:100,000 injection 10 mL   bacitracin ointment 1 Application   mineral oil, light topical 1 Application   gelatin absorbable (Gelfoam) 100 sponge 1 each   lidocaine (Xylocaine) 20 mg/mL (2 %) injection 50 mL   heparin 25,000 Units  in sodium chloride 0.9 % 250 mL irrigation 250 mL   lidocaine-epinephrine (Xylocaine W/EPI) 1 %-1:100,000 injection 20 mL   albumin human 5 % 750 mL   ampicillin-sulbactam (Unasyn) vial 3 g 6 g   dexAMETHasone (Decadron) injection 4 mg/mL 10 mg   esmolol (Brevibloc) injection 100 mg   HYDROmorphone (Dilaudid) injection 1 mg/mL 1 mg   LR infusion Cannot be calculated   LR infusion Cannot be calculated   lidocaine (cardiac) injection 2% prefilled syringe 70 mg   midazolam PF (Versed) injection 1 mg/mL 2 mg   phenylephrine (Jose Francisco-Synephrine) 10 mg in sodium chloride 0.9% 250 mL (0.04 mg/mL) infusion (premix) 1.46 mg   phenylephrine 40 mcg/mL syringe 10 mL 640 mcg   propofol (Diprivan) injection 10 mg/mL 200 mg   remifentanil (Ultiva) 1,000 mcg in sodium chloride 0.9% 50 mL (20 mcg/mL) infusion 3.57 mg   remifentanil (Ultiva) injection 680 mcg   rocuronium (ZeMuron) 50 mg/5 mL injection 180 mg              Anesthesia Record               Intraprocedure I/O Totals          Intake    Dexmedetomidine 0.00 mL    The total shown is the total volume documented since Anesthesia Start was filed.    Remifentanil Drip 215.63 mL    The total shown is the total volume documented since Anesthesia Start was filed.    Phenylephrine Drip 67.74 mL    The total shown is the total volume documented since Anesthesia Start was filed.    LR infusion 1800.00 mL    albumin human 5 % 750.00 mL    Total Intake 2833.37 mL       Output    Urine 2180 mL    Est. Blood Loss 150 mL    Total Output 2330 mL       Net    Net Volume 503.37 mL          Specimen:   ID Type Source Tests Collected by Time   1 : External jugular lymph node Tissue LYMPH NODE ENT (SPECIFY SITE) SURGICAL PATHOLOGY EXAM Susie Ferguson MD 1/28/2025 0919   2 : Right neck lymph node packet Level 1 Tissue LYMPH NODE ENT (SPECIFY SITE) SURGICAL PATHOLOGY EXAM Susie Ferguson MD 1/28/2025 1015   3 : Right neck lymph node packet Level 2 Tissue LYMPH NODE ENT (SPECIFY SITE)  SURGICAL PATHOLOGY EXAM Susie Ferguson MD 1/28/2025 1017   4 : Right neck lymph node packet Level 3 Tissue LYMPH NODE ENT (SPECIFY SITE) SURGICAL PATHOLOGY EXAM Susie Ferguson MD 1/28/2025 1017   5 : Right neck lymph node packet Level 4 Tissue LYMPH NODE ENT (SPECIFY SITE) SURGICAL PATHOLOGY EXAM Susie Ferguson MD 1/28/2025 1017   6 : Bilateral oropharyngectomy stich marks right inferior right tonsil Tissue SOFT TISSUE RESECTION SURGICAL PATHOLOGY EXAM Susie Ferguson MD 1/28/2025 1107   7 : Retro pharangeal mass a skull base. Tissue SOFT TISSUE RESECTION SURGICAL PATHOLOGY EXAM Susie Ferguson MD 1/28/2025 1111   8 : Right pharangeal margin Tissue SOFT TISSUE RESECTION SURGICAL PATHOLOGY EXAM Susie Ferguson MD 1/28/2025 1136   9 : Right floor of mouth margin Tissue SOFT TISSUE RESECTION SURGICAL PATHOLOGY EXAM Susie Ferguson MD 1/28/2025 1137   10 : Left glossal tonsil margin Tissue SOFT TISSUE RESECTION SURGICAL PATHOLOGY EXAM Susie Ferguson MD 1/28/2025 1138   11 : Left neck level 1 Tissue NECK DISSECTION LEFT (SPECIFY LEVELS) SURGICAL PATHOLOGY EXAM Susie Ferguson MD 1/28/2025 1241   12 : Left neck level 2 Tissue NECK DISSECTION LEFT (SPECIFY LEVELS) SURGICAL PATHOLOGY EXAM Susie Ferguson MD 1/28/2025 1241   13 : Left neck level 3 Tissue NECK DISSECTION LEFT (SPECIFY LEVELS) SURGICAL PATHOLOGY EXAM Susie Ferguson MD 1/28/2025 1241   14 : Left neck level 4 Tissue NECK DISSECTION LEFT (SPECIFY LEVELS) SURGICAL PATHOLOGY EXAM Susie Ferguson MD 1/28/2025 1242                 Drains and/or Catheters:   Closed/Suction Drain 1 Right Neck Bulb 10 Fr. (Active)   Site Description Healing 01/31/25 0742   Dressing Status Other (Comment) 01/31/25 0742   Drainage Appearance Serosanguineous 01/30/25 2100   Status To bulb suction 01/30/25 2100   Output (mL) 20 mL 01/31/25 0322       Closed/Suction Drain 2 Right Neck Bulb 10 Fr. (Active)   Site Description Healing  01/31/25 0742   Dressing Status Other (Comment) 01/31/25 0742   Drainage Appearance Serosanguineous 01/30/25 2100   Status To bulb suction 01/30/25 2100   Output (mL) 30 mL 01/31/25 0322       Closed/Suction Drain 3 Left Neck Bulb 10 Fr. (Active)   Site Description Healing 01/31/25 0742   Dressing Status Other (Comment) 01/31/25 0742   Drainage Appearance Serosanguineous 01/30/25 2100   Status To bulb suction 01/30/25 2100   Output (mL) 10 mL 01/31/25 0322       Closed/Suction Drain 4 Left Bulb 10 Fr. (Active)   Site Description Healing 01/31/25 0742   Dressing Status Other (Comment) 01/31/25 0742   Drainage Appearance Serosanguineous 01/30/25 2100   Status To bulb suction 01/30/25 2100   Output (mL) 25 mL 01/31/25 0322       Gastrostomy/Enterostomy PEG-jejunostomy 1 20 Fr. LUQ (Active)   Surrounding Skin Dry;Intact 01/31/25 0742   Drain Status Clamped 01/30/25 0915   Drainage Appearance None 01/30/25 2100   Site Description Healing 01/30/25 2100   Dressing Type Open to air 01/30/25 2100   Dressing Status Clean;Dry 01/31/25 0742   Dressing Intervention Dressing reinforced 01/29/25 1020   Tube Feeding Frequency Continuous 01/31/25 0322   Tube Feeding Pivot 1.5 01/31/25 0322   Tube Feeding Strength Full strength 01/30/25 2100   Tube Feeding Method Continuous per pump 01/30/25 2100   Tube Feeding Rate (ml/hr) 10 mL/hr 01/31/25 0322   Tube Feeding Bag Changed Yes 01/30/25 2100   Feeding Tube Flushed With Tap water 01/30/25 2349   Intake (mL) 40 mL 01/30/25 2100   Intake - Flush (mL) 120 mL 01/30/25 2349   Output (mL) 0 mL 01/30/25 2100       [REMOVED] Closed/Suction Drain Inferior;Left Other (Comment) Bulb 10 Fr. (Removed)       [REMOVED] Urethral Catheter Straight-tip;Non-latex 16 Fr. (Removed)   Site Assessment Clean;Skin intact 01/29/25 0900   Collection Container Standard drainage bag 01/29/25 0245   Securement Method Securing device (Describe) 01/29/25 0245   Output (mL) 150 mL 01/29/25 0934       Tourniquet Times:      Total Tourniquet Time Documented:  Arm - Upper (Left) - 80 minutes  Total: Arm - Upper (Left) - 80 minutes      Implants:  Implants       Type Name Action Serial No.       PEG KIT, SAFETY, PULL, 20FR W/ENFIT - [id number] Implanted      Screw PLATE, TEMPLATE, FOR 20-HOLE STR PLATES 2.0 / 2.5 / 3.0 MM - [id number] Used, Not Implanted      Screw PLATE, LOCK 2.0 20H STRAIGHT - [id number] Implanted      Screw SCREW, LOCK 2.0 X 11 MAXDRV TI ALLOY - [id number] Implanted      Screw SCREW, LOCK 2.0 X 13 MAXDRV TI ALLOY - [id number] Implanted      Screw SCREW, LOCK 2.0 X 19 MAXDRV TI ALLOY - [id number] Wasted      Implant DEVICE, ANASOTMOTIC 2.5MM - [id number] Implanted               Findings:   -Complex defect of total soft palate, b/l lateral pharynx, R posterior pharynx, R BoT, as well as mandibulotomy defect of anterior and lateral FoM and Vestibule.   -15cm x 8cm L RFFF harvested with communicating vein between cephalic and Vcs,  Soft palate, nasopharyngeal port,pharynx and oral vestibule completely reconstructed to aid in nasal breathing, deglutition and speech, and to prevent VPI.  -Anastomosis to R Facial Artery and EJ Vein  -Complex layered closure with adjacent tissue transfer and STSG for L arm, STSG covered with wound vac    Indications: Sim Guaman is an 44 y.o. 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     The patient was seen in the preoperative area. The risks, benefits, complications, treatment options, non-operative alternatives, expected recovery and outcomes were discussed with the patient. The possibilities of reaction to medication, pulmonary aspiration, injury to surrounding structures, bleeding, recurrent infection, the need for additional procedures, failure to diagnose a condition, and creating a complication requiring transfusion or operation were discussed with the patient. The patient concurred with the proposed plan, giving informed consent.  The site  of surgery was properly noted/marked if necessary per policy. The patient has been actively warmed in preoperative area. Preoperative antibiotics have been ordered and given within 1 hours of incision. Venous thrombosis prophylaxis have been ordered including bilateral sequential compression devices    Procedure Details:   See Dr. Ferguson' Op Note for details regarding ablation and neck dissection      I began the reconstruction by measuring the defect with the dimensions as above, and confirming the presence of adequate vessels for anastomosis.     I then moved down to the forearm for flap harvest.     An esmar was used to exsanguinate the arm and the tourniquet was inflated to 250 mm Hg. I marked out a 15cm x 8cm cm flap starting 2 cm proximal to the thenar eminence. The flap was centered over the palpable radial artery. It was lateral enough to include the cephalic vein. The incision was made circumferentially, and extended proximally into the antebrachial fossa. I began on the radial side by dissecting through the subcutaneous fat down to the brachialradialis muscle and tendon. I elevated the skin off the underlying tendon and muscle suprafascially, leaving plenty of paratenon. The cephalic vein was included with the flap while the superficial radial nerve was identified and preserved in it's entirety.     I then began on the ulnar side, dissecting down over to the muscle fascia. The skin was elevated suprafascially until the flexor carpi radialis tendon was reached. Again paratenon was left on the tendon, and then the dissection transitioned into a subfascial plane after reaching the lateral edge of the tendon. The pedicle and its skin perforators were identified.     At this point, I elevated proximally to expose the cephalic vein running in an ulnar direction. This was traced to an adequate distance and size into the proximal forearm. The pedicle was then traced in a similar fashion into the antebrachial  fossa, taking care to clip small branches to surrounding muscle. The artery and VCs were then . The tourniquet was then released and hemostasis was achieved on the flap and in the arm. The artery was then clipped distally with 3 clips, and the Vc's clipped distally with 2 clips each. Proximally, the artery was clipped as well with 3 clips, and the veins 2 each.  A proximal pedicle was dissected and we did identify a bridging vein between the cephalic vein and the deep VCs.  We took this vein proximal to this such that there was a single vein draining both deep and superficial systems.  The flap was then transferred up to the head for microvascular anastomosis and inset.     We then began our inset.  The defect was complex with bilateral lateral pharyngeal defects as well as a defect of the right posterior pharynx and nasopharynx with a small strip of residual left-sided posterior pharynx and nasopharynx.  The goal of the reconstruction was to create a patent and watertight nasopharyngeal port as well as create a new soft palate to prevent VPI and improve deglutition and speech. Primary closure of the a small portion of the right base of tongue/pharynx was completed using 3-0 vicryl as well as the left tonsillar fossa. We first closed the right sided edge of the residual pharyngeal mucosa over to the left residual lateral pharynx starting inferiorly working up approximately 1 cm until the defect was too wide to close without tension.  We then began to inset our distal free flap edge to the remaining right sided residual pharyngeal defect from the primary closure up to the native remaining nasopharyngeal mucosa.  The free flap was then flipped into the left with a small line of de-epithelialization  where it was then sutured to the residual left lateral nasopharynx and pharynx mucosa to create a tube that would thus become the new nasopharyngeal pathway.  The flap was then again flipped over itself and  brought from left to right towards the right to recreate an entire soft palate and this was sutured superiorly to the hard palate junction and inferiorly to itself to create a free new soft palate edge.  All this was done with 3-0 Vicryl suture in a simple interrupted or horizontal mattress fashion where appropriate.  The pedicle was brought medial to the mandible under the saved lingual nerve into the right neck.  We then moved to the microvascular portion of the procedure prior to closing the mandibulotomy and performing her vestibuloplasty.  Our free flap inset completely reconstructed the nasopharynx pharynx and soft palate.    The facial artery stump was identified. Some small venous contributions were found and clipped. The adventitia was cleaned off carefully and the lumen was irrigated with heparin saline. A V3 clamp was applied and the artery was noted to have excellent pulsatile flow. The external jugular vein was identified that seemed to be a good match for our flap vein. A V3 clamp was applied to the base as well, there was good back flow.     At this point, the arteries were placed in a double approximating clamp and anastomosed  using 9-0 nylon sutures in an interrupted fashion. After this was complete, the veins were coupled using a 2.5 . The vessel clamps were released. The artery had excellent arterial flow. There was a leak that required 1 rescue stitch.  A strip test was performed on the veins and they showed good flow. A doppler was placed on the artery and confirmed flow.     The neck was then copiously irrigated and PREM drains were placed.  We rechecked the microvascular pedicle geometry and this was adequate without any compression of the vein or artery.      We then returned to the patient's mouth.  To finish our inset and closure.  There was very little residual mucosa on the patient's right mandibular medial mandible so this was therefore sharply debrided and the mandible then closed  with the previously fashioned reconstruction plate.  The flap and then lateral floor of mouth was reapproximated to the mandible using circumdental 3-0 Vicryl's across all dentition.  There was 1 tooth socket where there had been a tooth extracted which we pulled mucosa over to cover exposed bone. There was an additional broken tooth that was extracted.  Anteriorly we brought our anterior floor of mouth together in a watertight fashion working through the anterior vestibule and up onto the midline lower lip.  We completely recreated the oral vestibule to aid in deglutition and speech.     The donor site was then closed in layers. First, hemostasis was achieved and the wound copiously irrigated. Some adjacent tissue transfer was needed to allow for tension free closure. A bovie was used to widely undermine in a suprafascial plane medially and laterally. The skin was brought together and noted to have minimal tension.  A 15 cm x 10 cm split thickness skin graft was harvested from the patient's left thigh and used to cover the exposed muscles and tendons of the lower forearm.  1 drains were placed and secured using 2-0 prolene sutures.  A 5-0 fast was used for the skin.     The neck was then closed in layers using 3-0 Vicryl deeply and 4-0 Monocryl for the skin.     Finally, the ET tube was removed, and a 6-0 cuffed Shiley was placed. This was secured into position with 2-0 silk sutures.      The fellow was involved in the critical parts of the advanced portions of this procedure which include the harvest of the free flap, critical portions working under and using  the operating microscope for the microvascular anastomosis, insetting of the flap including for the reconstruction as there was  no qualified resident of suitable training available for these portions of the procedure.  Resident involvement in other portions of the procedure going on simultaneously include closure of the donor site as well as obtaining skin  graft for closure.     Complications:  None; patient tolerated the procedure well.    Disposition: PACU - hemodynamically stable.  Condition: stable     Additional Details: None    Attending Attestation: I was present and scrubbed for the key portions of the procedure.    Vivienne Mclaughlin  Phone Number: [phone]

## 2025-01-31 NOTE — NURSING NOTE
"KORI Progress Note:     [facility] services following patient due to high anxiety and attempting to interfere with medical care/equipment.     Per bedside nurse, patient's anxiety has improved in comparison to previous shifts. Patient has been appropriately requesting his prn medications for anxiety. Patient has clonazepam 1 mg prn qid ordered. History of medication administration times listed below:          1/29/25 1/30/25 1/31/25   clonazePAM (KlonoPIN) tablet 1 mg  Dose: 1 mg  Freq: 4 times daily PRN Route: gtub  PRN Reason: anxiety  Start: 01/29/25 1348    1731 (1 mg)   2155 (1 mg)      0445 (1 mg)   1111 (1 mg)     1730 (1 mg)   2314 (1 mg)      0537 (1 mg)   0934 (1 mg)          On assessment, patient is working with physical therapy. After PT session, respiratory therapy is at the bedside. No acute concerns from bedside RN at this time.     [facility] services available to patient and staff 24/7 throughout hospitalization. Please secure chat \"KORI\" with any questions or concerns.   "

## 2025-01-31 NOTE — SIGNIFICANT EVENT
ENT Flap Check    Subjective:  Patient doing well. He is sitting up in the chair. Supportive Oncology continues to follow.    Objective:      1/30/2025     9:01 PM 1/30/2025    11:26 PM 1/31/2025     1:49 AM 1/31/2025     3:01 AM 1/31/2025     4:13 AM 1/31/2025     5:52 AM 1/31/2025     9:51 AM   Vitals   Systolic 93 102 90 82 90 96 107   Diastolic 56 64 55 51 60 61 62   BP Location   Right arm Right arm  Right arm Right arm   Heart Rate 61 86 68 67 88 72 82   Temp 36 °C (96.8 °F) 36 °C (96.8 °F) 35.9 °C (96.6 °F) 36.1 °C (97 °F)  36.1 °C (97 °F) 36.3 °C (97.4 °F)   Resp 16 18 16 16  16 16     Gen: NAD, resting comfortably in chair  Face/Neck: All incisions c/d/i, neck soft and flat without evidence of hematoma or fluid collection  -Internal doppler with strong arterial signal  -Trach in midline position without bleeding or thick tracheal secretions  Oral Cavity: All intraoral incisions c/d/i without evidence of dehiscence  -Flap soft and pink with good capillary refill  Extremities: left arm warm with intact movement and sensation  -left thigh skin graft donor site with tegaderm in place  Abdomen: PEG in place  Drains: All drains in place and holding suction with serosanguinous drainage (stripped)    Assessment:  S/p DL, trach, PEG ([facility]), pharyngectomy, right parasymphyseal mandibulotomy with recon bar, bilat SND 1-4, excision of R retropharyngeal LN, recon with L RFFF, STSG 1/29 with Dr. Mclaughlin and Dr. Ferguson.     -Continue Q6hr ENT flap checks  -Update pain recs    LigiaSarita Gordon, APRN-CNP

## 2025-01-31 NOTE — SIGNIFICANT EVENT
"ENT Flap Check  Subjective:  Resting comfortably in chair.       Physical Examination\"  Vitals: Temp:  [35.9 °C (96.6 °F)-36.4 °C (97.5 °F)] 35.9 °C (96.6 °F)  Heart Rate:  [61-86] 68  Resp:  [12-18] 16  BP: ()/(55-82) 90/55  FiO2 (%):  [21 %-28 %] 21 %  Gen: NAD, resting comfortably in chair  Face/Neck: All incisions c/d/i, neck soft and flat without evidence of hematoma or fluid collection  -Internal doppler with strong arterial signal  -Trach in midline position without bleeding or thick tracheal secretions  Oral Cavity: All intraoral incisions c/d/i without evidence of dehiscence  -Flap soft and pink with good capillary refill  Extremities: left arm warm with intact movement and sensation  -left thigh skin graft donor site with tegaderm in place  Abdomen: PEG in place  Drains: All drains in place and holding suction with serosanguinous drainage (stripped)    Assessment:  S/p DL, trach, PEG ([facility]), pharyngectomy, right parasymphyseal mandibulotomy with recon bar, bilat SND 1-4, excision of R retropharyngeal LN, recon with L RFFF, STSG 1/29 with Dr. Mclaughlin and Dr. Ferguson.     Plan:  -Continue q6hr flap checks      Tamika Mcmanus MD - PGY2  Otolaryngology - Head & Neck Surgery  [facility]    I am the night resident. I can only be contacted from 5 PM to 6 AM. Page on weekends or off hours.   ENT Consult pager: [phone]  ENT Peds pager: [phone]  ENT Head & Neck Surgery Phone: [phone]  ENT subspecialty team: Mick individual resident who wrote today's note  ENT Outpatient scheduling number: [phone]  Please Page [phone] or call [phone] if Urgent        "

## 2025-01-31 NOTE — SIGNIFICANT EVENT
ENT Flap Check    Subjective:  Patient doing well. He is sitting up in the chair. Supportive Oncology continues to follow.    Objective:      1/31/2025     1:49 AM 1/31/2025     3:01 AM 1/31/2025     4:13 AM 1/31/2025     5:52 AM 1/31/2025     9:51 AM 1/31/2025    12:30 PM 1/31/2025     5:00 PM   Vitals   Systolic 90 82 90 96 107 96 96   Diastolic 55 51 60 61 62 60 54   BP Location Right arm Right arm  Right arm Right arm Right arm Right arm   Heart Rate 68 67 88 72 82 80 82   Temp 35.9 °C (96.6 °F) 36.1 °C (97 °F)  36.1 °C (97 °F) 36.3 °C (97.4 °F) 36.5 °C (97.7 °F) 36.3 °C (97.4 °F)   Resp 16 16  16 16 16 16     Gen: NAD, resting comfortably in chair  Face/Neck: All incisions c/d/i, neck soft and flat without evidence of hematoma or fluid collection  -Internal doppler with strong arterial signal  -Trach in midline position without bleeding or thick tracheal secretions  Oral Cavity: All intraoral incisions c/d/i without evidence of dehiscence  -Flap soft and pink with good capillary refill  Extremities: left arm warm with intact movement and sensation  -left thigh skin graft donor site with tegaderm in place  Abdomen: PEG in place  Drains: All drains in place and holding suction with serosanguinous drainage (stripped)    Assessment:  S/p DL, trach, PEG ([facility]), pharyngectomy, right parasymphyseal mandibulotomy with recon bar, bilat SND 1-4, excision of R retropharyngeal LN, recon with L RFFF, STSG 1/29 with Dr. Mclaughlin and Dr. Ferguson.     -Continue Q6hr ENT flap checks    Gonzalez Yoo MD

## 2025-01-31 NOTE — PROGRESS NOTES
SUPPORTIVE AND PALLIATIVE ONCOLOGY PROGRESS NOTE    SERVICE DATE: 2025    SUBJECTIVE:  HISTORY OF PRESENT ILLNESS: Sim Guaman is a 44 y.o. 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 PMHx significant for anxiety, hypothyroidism, and arthritis. Now s/p DL, tracheostomy, PEG, pharyngectomy, R parasymphyseal mandibulotomy with recon bar, bilat SND 1-4, excision of R retropharyngeal LN, recon with L RFFF, and STSG on 25 with with Dr. Mclaughlin and Dr. Ferguson. Supportive and Palliative Oncology is following for assistance with pain and anxiety management.     Interval Events:  Pt reporting uncontrolled pain, however pt looks markedly better than my prior two visits with him. NPAT 0, anxiety remains controlled. I found it somewhat concerning that pt is persistently requesting specific numerically higher oxycodone IR doses compared to his home regimen. Explained to pt how I come to PO pain regimen dosing/frequency based on past 24h tolerated OME intake. Education provided regarding safe/responsible opioid prescribing and risk for associated side effects if administering too much opioid pain medications to pt. Pt expressing understanding.     Summarized Recommendations (25):  Discontinue hydromorphone PCA  Start oxycodone solution 15mg via PEG q3h PRN for moderate pain  Start oxycodone solution 25mg via PEG q3h PRN for severe pain  Start hydromorphone 1mg IV q2h PRN for breakthrough pain  Conservative dosing chosen based on past 24h OME intake [575.25 OME]. Even with 50% dose reduction accounting for incomplete cross tolerance, pt would have originally qualified for as much as oxycodone IR 30mg PO q3h PRN.     Pain Assessment:  Location: Bilateral and anterior neck  Duration: Constant  Characteristics:  Ratin/10--In the beginning of our visit, pt stating his pain is better than yesterday. Although after discussing safe/responsible opioid dosing and  declining him selecting his preferred opioid dose, pt now stating his pain is no better than yesterday. Pt additionally requesting multiple times I contact his PCP to see how much his PCP thinks he should get. Informed him that his PCP would be unable to safely recommend dosing given she does not have access to his past 24h OME, nor has she been assessing him throughout this admission.   Descriptors: Aching, throbbing, sharp, poking  Aggravating: Movement, swallowing, coughing   Relieving: Analgesics--PCA, positioning, and modifying activity  Intolerances: None  Interference with Function: None    NPAT:   Emotion: 0  Movement: 0  Verbal Cues: 0  Facial Cues: 0  Positioning/Guardin  Total Score: 0         Opioid Requirements  Past 24 h opioid requirements:  (-, 7497-8349)  hydromorphone PCA in-person review:   Demand: 140, Received: 78, Total Dru.02mg IV = 575.25 OME    Total 24h OME use: 575.25 OME    Note: OME calculations based on equianalgesic table below. Please note this table is based on best available evidence but conversions are still approximate. These are NOT opioid DOSES for individual patient use; this is equivalency information.  Drug Parenteral Enteral   Morphine 10 25   Oxycodone N/A 20   Hydromorphone 2 5   Fentanyl 0.15 N/A   Tramadol N/A 120   Citation: Shama CARRASCO. Demystifying opioid conversion calculations: A guide for effective dosing, Second edition. MD Blank: American [employer] of [employer]System Pharmacists, 2018.    Symptom Assessment:  Anxiety: a little--though well controlled  Nausea: none  Constipation: none    Information obtained from: chart review, interview of patient, and discussion with primary team  ______________________________________________________________________   Objective     Lab Results   Component Value Date    WBC 10.8 2025    HGB 7.5 (L) 2025    HCT 23.1 (L) 2025    MCV 87 2025     2025      Lab Results    Component Value Date    GLUCOSE 99 01/31/2025    CALCIUM 8.0 (L) 01/31/2025     01/31/2025    K 3.6 01/31/2025    CO2 26 01/31/2025     01/31/2025    BUN 8 01/31/2025    CREATININE 0.53 01/31/2025     Lab Results   Component Value Date    ALT 14 01/10/2025    AST 28 01/10/2025    ALKPHOS 72 01/10/2025    BILITOT 0.2 01/10/2025     Estimated Creatinine Clearance: 125 mL/min (by C-G formula based on SCr of 0.53 mg/dL).     Scheduled medications   acetaminophen, 1,000 mg, g-tube, q8h ELANA  aspirin, 325 mg, g-tube, Daily  enoxaparin, 40 mg, subcutaneous, q24h  esomeprazole, 40 mg, g-tube, Daily before breakfast  gabapentin, 300 mg, g-tube, Nightly  hydrogen peroxide, 1 Application, Topical, TID  oxygen, , inhalation, Continuous - Inhalation  polyethylene glycol, 17 g, g-tube, Daily  senna, 10 mL, g-tube, BID  white petrolatum, 1 Application, Topical, TID    Continuous medications  HYDROmorphone,     PRN medications  clonazePAM, 1 mg, 4x daily PRN  naloxone, 0.2 mg, q5 min PRN  ondansetron, 4 mg, q8h PRN    PHYSICAL EXAMINATION:  Vital Signs:   Vital signs reviewed  Vitals:    01/31/25 0951   BP: 107/62   Pulse: 82   Resp: 16   Temp: 36.3 °C (97.4 °F)   SpO2: 95%     Pain Score: 8    Physical Exam  Vitals reviewed.   Constitutional:       Comments: Alert, ill appearing gentleman sitting up in chair. No acute signs of distress. Cooperative and participating in interview.  HENT:      Head:      Comments: Normocephalic, bilateral jaw line with mild non-pitting edema.   Eyes:      Comments: Sclera clear, EOM intact.    Neck:      Comments: Tracheostomy midline. No evidence of drainage or bleeding at tracheostomy site. X3 PREM drains visualized.   Pulmonary:      Comments: Symmetrical chest rise. Regular rate and depth of respirations. Room air via trache.   Abdominal:      Comments: Abdomen non distended, non tender, and soft. PEG present.    Musculoskeletal:      Comments: Normal muscle strength and tone. ADAMS x4.  No visible extremity edema.   Skin:     Comments: No lesions, rash, or abrasions present on visible skin. Skin color appropriate for ethnicity. L forearm wound vac in place.    Neurological:      Comments: A&Ox4, follows commands, no apparent sensory deficits.   Psychiatric:      Comments: Mood and behavior appropriate.     ASSESSMENT/PLAN:  Sim Guaman is a 44 y.o. 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 PMHx significant for anxiety, hypothyroidism, and arthritis. Now s/p DL, tracheostomy, PEG, pharyngectomy, R parasymphyseal mandibulotomy with recon bar, bilat SND 1-4, excision of R retropharyngeal LN, recon with L RFFF, and STSG on 1/28/25 with with Dr. Mclaughlin and Dr. Ferguson. Supportive and Palliative Oncology is following for assistance with pain and anxiety management.      Neoplasm Related Pain  Post Operative Pain  Bilateral and anterior neck pain related to known malignancy. C/b accompanying headaches.   Acute post-operative pain 2/2 DL, tracheostomy, PEG, pharyngectomy, R parasymphyseal mandibulotomy with recon bar, bilat SND 1-4, excision of R retropharyngeal LN, recon with L RFFF, and STSG on 1/28/25.  Type: Somatic, possibly neuropathic  Pain control: Well controlled  Home regimen: acetaminophen, oxycodone IR 15mg q4h PRN   Intolerances: None  Previously tried: hydromorphone IR PO, Percocet  Total OME usage for the past 24 hours: 575.25 OME  Reviewed: (1/10/25) Hepatic function historically WNL.  Renal function WNL.   Discontinue hydromorphone PCA  Start oxycodone solution 15mg via PEG q3h PRN for moderate pain  Start oxycodone solution 25mg via PEG q3h PRN for severe pain  Start hydromorphone 1mg IV q2h PRN for breakthrough pain  Continue scheduled acetaminophen 1g oral liquid via PEG q8h  Continue gabapentin 300mg PO once daily HS--will assist with neuropathic pain and possibly aide mood  Continue to monitor pain scores and administer PRN  medications as appropriate  Continue/initiate nonpharmacologic pain management strategies including ice/heat therapy, distraction techniques, deep breathing/relaxation techniques, calming music, and repositioning  Continue to monitor for signs of opioid efficacy (pain scores, improved functionality) and toxicity (pinpoint pupils, excess sedation/drowsiness/confusion, respiratory depression, etc.)  Likely exacerbated by uncontrolled anxiety, see recs     Nausea  At risk for nausea and vomiting related to recent surgery and opioid use   Home regimen: None  No recent EKG on file in EMR.  PPI per primary   Continue ondansetron 4mg IV q8h PRN for n/v, first line [started by primary]     Constipation  At risk for constipation related to recent surgery and opioid use, currently not constipated.  Usual bowel pattern: Every day  Home regimen: None  LBM: 1/29/25 AM per pt  Continue scheduled Miralax 17g via PEG once daily  Continue scheduled senna syrup 10ml via PEG BID  Goal to have BM without straining q48-72h, adjust regimen as needed  Encourage mobility as tolerated, PT/OT following     Altered Mood  Acute on chronic anxiety related to pain and hospitalization.   Sub-optimally controlled.   Home regimen: clonazepam 1mg q6h PRN, had not been taking  buspirone at home  Continue clonazepam 1mg via PEG q6h PRN for anxiety  If need arises, consider consulting Psych for anxiety management and regimen optimization  Spiritual Care following for support     Altered Nutrition  Altered nutrition related to malignancy, recent surgery s/p trache/PEG, new TF initiation.   Weight loss: None  Home regimen: None  Consider consulting Nutrition if needed  Currently on trickle TF--tolerating     Medical Decision Making/Goals of Care/Advance Care Planning:  (1/29/25) Complete GOC/ACP planning not completed due to uncontrolled pain and emotional symptom burden. Confirmed surrogate decision maker and Full Code status with pt.      Advance  Directives  Existence of Advance Directives: None  Decision maker: Surrogate decision maker is pt's wife, Corinne ([phone])  Code Status: Full Code     Disposition:  Please start the process of having prior authorization with meds to beds deliver medications to patient prior to discharge via [facility] pharmacy. Prescriptions will need to be sent 48-72 hours prior to discharge so that a prior authorization can be completed.      Discharge date pending resolution of acute hospital issues and symptom control.  Per discussion with primary team, due to geographic location of pt's home, likely that pt will continue following with his existing PCP for pain and symptom management after discharge. PCP was previously managing pt's pain/symptom regimen.     Supportive and Palliative Oncology encounter:  Spoke with patient at bedside.  Emotional support provided.  Coordination of care: medication and symptom re-evaluation    Signature and billing:  Medical complexity was high level due to due to complexity of problems, extensive data review, and high risk of management/treatment.    I spent 75 minutes in the care of this patient which included chart review, interviewing patient/family, discussion with primary team, coordination of care, and documentation.    DATA   Diagnostic tests and information reviewed for today's visit:  Conversation with primary team, Most recent labs, Most recent EKG [acknowledged none on file], Medications     Some elements copied from my note on 1/30/25, the elements have been updated and all reflect current decision making from today, 1/31/2025.    Plan of Care discussed with: Primary team, pt    Thank you for asking Supportive and Palliative Oncology to assist with care of this patient.  Recommendations will be communicated back to the consulting service by way of shared electronic medical record/secure chat/email or face-to-face.   We will continue to follow.  Please contact us for additional  questions or concerns.    SIGNATURE: MEGA Gomes  PAGER/CONTACT:  Contact information:  Supportive and Palliative Oncology  Monday-Friday 8 AM-5 PM  Epic Secure chat or pager [phone].  After hours and weekends:  pager [phone]

## 2025-01-31 NOTE — PROGRESS NOTES
Spiritual Care Visit     Reason for Visit:  Routine Visit: Follow-up  Continue Visiting: Yes     Focus of Care:  Visited With: Patient       Spiritual Care Assessment    Care Provided:  Intended Effects: Build relationship of care and support, Demonstrate caring and concern  Methods: Encourage sharing of feelings, Offer support  Interventions: Acknowledge current situation, [place]    Sense of Community and or Caodaism Affiliation:  Jehovah's witness      Spiritual Care Annotation    Annotation:   followed up with patient Sim Guaman. Patient consented to visit with medical student who was shadowing . Patient communicated by writing how he was feeling, expressing he was feeling better and that God has a purpose for him, that his wife and children still need him. Patient requested prayer, which  provided. Patient was appreciative of care and did not express any other needs at this time. Spiritual Care remains available as needed/requested.    Rev. Latoya Rossi MDiv, [facility]

## 2025-01-31 NOTE — PROGRESS NOTES
Physical Therapy    Physical Therapy Treatment    Patient Name: Sim Guaman  MRN: [id number]  Department: [facility]  Room: [address] [place] [address]  Today's Date: 1/31/2025  Time Calculation  Start Time: 1324  Stop Time: 1414  Time Calculation (min): 50 min         Assessment/Plan   PT Assessment  End of Session Communication: Bedside nurse  Assessment Comment: Pt with improvements in mobility this session. Pt initially hesitant to mobilize 2/2 lines and drains, but eventually agreeable. Pt remains most limited by pain. Pt remains appropriate for Low Intensity Rehab after DC.  End of Session Patient Position: Up in chair, Alarm off, caregiver present (RN present)     PT Plan  Treatment/Interventions: Bed mobility, Transfer training, Stair training, Gait training, Balance training, Neuromuscular re-education, Strengthening, Endurance training, Therapeutic exercise, Therapeutic activity, Home exercise program, Positioning  PT Plan: Ongoing PT  PT Frequency: 3 times per week  PT Discharge Recommendations: Low intensity level of continued care (pending progress)  Equipment Recommended upon Discharge:  (TBD)  PT Recommended Transfer Status: Assist x1  PT - OK to Discharge: Yes (Once medically cleared)      General Visit Information:   PT  Visit  PT Received On: 01/31/25  Response to Previous Treatment: Patient with no complaints from previous session.  General  Family/Caregiver Present: No  Prior to Session Communication: Bedside nurse  Patient Position Received: Alarm off, not on at start of session, Up in chair (Sitter present)  Preferred Learning Style: auditory, verbal, visual  General Comment: Pt pleasant and agreeable to PT session    Subjective   Precautions:  Precautions  Hearing/Visual Limitations: WFL  Medical Precautions: Fall precautions     Date/Time Vitals Session Patient Position Pulse Resp SpO2 BP MAP (mmHg)    01/31/25 1324 During PT  --  --  --  97 %  --  --                 Objective   Pain:  Pain  Assessment  Pain Assessment: 0-10  0-10 (Numeric) Pain Score: 5 - Moderate pain  Pain Type: Surgical pain  Pain Location: Face  Pain Interventions: Repositioned, Ambulation/increased activity  Cognition:  Cognition  Overall Cognitive Status: Within Functional Limits  Orientation Level: Oriented X4  Insight: Mild  Impulsive: Mildly  Coordination:  Movements are Fluid and Coordinated: Yes  Postural Control:  Postural Control  Postural Control: Within Functional Limits  Static Sitting Balance  Static Sitting-Balance Support: Feet supported, No upper extremity supported  Static Sitting-Level of Assistance: Distant supervision  Static Standing Balance  Static Standing-Balance Support: No upper extremity supported  Static Standing-Level of Assistance: Close supervision  Static Standing-Comment/Number of Minutes: 15 mins total  Extremity/Trunk Assessments:    RLE   RLE : Within Functional Limits  LLE   LLE : Within Functional Limits  Activity Tolerance:  Activity Tolerance  Endurance: Endurance does not limit participation in activity  Early Mobility/Exercise Safety Screen: Proceed with mobilization - No exclusion criteria met  Treatments:    Therapeutic Activity  Therapeutic Activity Performed: Yes  Therapeutic Activity 1: Increased time in seated and standing balance to use bathroom and perform hygiene    Bed Mobility  Bed Mobility: No (Seated in chair to start and finish session)    Ambulation/Gait Training  Ambulation/Gait Training Performed: Yes  Ambulation/Gait Training 1  Surface 1: Level tile  Device 1: No device  Assistance 1: Close supervision  Quality of Gait 1:  (Decreased bill 2/2 lines and drains)  Comments/Distance (ft) 1: 20ft within room, deferring further ambulation 2/2 frustration with lines/drains  Transfers  Transfer: Yes  Transfer 1  Transfer From 1: Chair with arms to  Transfer to 1: Stand  Technique 1: Sit to stand  Transfer Level of Assistance 1: Close supervision  Transfers 2  Transfer From 2:  Stand to  Transfer to 2: Toilet  Technique 2: Stand to sit  Transfer Level of Assistance 2: Close supervision  Transfers 3  Transfer From 3: Toilet to  Transfer to 3: Stand  Technique 3: Sit to stand  Transfer Level of Assistance 3: Close supervision  Transfers 4  Transfer From 4: Stand to  Transfer to 4: Chair with arms  Technique 4: Stand to sit  Transfer Level of Assistance 4: Close supervision    Stairs  Stairs: No      Outcome Measures:  [facility] Basic Mobility  Turning from your back to your side while in a flat bed without using bedrails: A little  Moving from lying on your back to sitting on the side of a flat bed without using bedrails: A little  Moving to and from bed to chair (including a wheelchair): A little  Standing up from a chair using your arms (e.g. wheelchair or bedside chair): A little  To walk in hospital room: A little  Climbing 3-5 steps with railing: A little  Basic Mobility - Total Score: 18    Education Documentation  Precautions, taught by Zoey Mays PT at 1/31/2025  2:44 PM.  Learner: Patient  Readiness: Acceptance  Method: Explanation, Demonstration  Response: Verbalizes Understanding, Demonstrated Understanding    Body Mechanics, taught by Zoey Mays PT at 1/31/2025  2:44 PM.  Learner: Patient  Readiness: Acceptance  Method: Explanation, Demonstration  Response: Verbalizes Understanding, Demonstrated Understanding    Mobility Training, taught by Zoey Mays PT at 1/31/2025  2:44 PM.  Learner: Patient  Readiness: Acceptance  Method: Explanation, Demonstration  Response: Verbalizes Understanding, Demonstrated Understanding    Education Comments  No comments found.        Encounter Problems       Encounter Problems (Active)       PT Problem       Pt will perform supine to sit transfer independently  (Progressing)       Start:  01/29/25    Expected End:  02/12/25            Pt will ambulate 300 feet with mod I and LRAD  (Progressing)       Start:  01/29/25    Expected End:   02/12/25            Pt will ascend/descend 10 steps with mod I and LRAD  (Not Progressing)       Start:  01/29/25    Expected End:  02/12/25            Pt will score >24 on tinetti to ensure low falls risk  (Progressing)       Start:  01/29/25    Expected End:  02/12/25               Pain - Adult

## 2025-01-31 NOTE — NURSING NOTE
ENT Note:    RN to bedside for trach and PEG education throughout shift. Patient did not wish to engage with PEG tube feedings and med passes when encouraged but asked questions about home going. RN provided education on how to use PEG and expectations with medications at home. RN encouraged patient to engage with trach care but was met with resistance. Patient did not wish to learn tracheal suctioning or cleaning. RN explained importance of knowledge for safety purposes and demonstrated suctioning and cleaning at bedside. Will continue to encourage education to prepare for safe discharge.

## 2025-01-31 NOTE — CARE PLAN
The patient's goals for the shift include safety and comfort.      The clinical goals for the shift include Pt will remain safe and maintain flap/VSS throughout shift.    Over the shift, the patient did not make progress toward the following goals. Barriers to progression include . Recommendations to address these barriers include       Problem: Pain - Adult  Goal: Verbalizes/displays adequate comfort level or baseline comfort level  Outcome: Progressing     Problem: Safety - Adult  Goal: Free from fall injury  Outcome: Progressing     Problem: Discharge Planning  Goal: Discharge to home or other facility with appropriate resources  Outcome: Progressing     Problem: Chronic Conditions and Co-morbidities  Goal: Patient's chronic conditions and co-morbidity symptoms are monitored and maintained or improved  Outcome: Progressing     Problem: Nutrition  Goal: Nutrient intake appropriate for maintaining nutritional needs  Outcome: Progressing     Problem: Pain  Goal: Takes deep breaths with improved pain control throughout the shift  Outcome: Progressing  Goal: Turns in bed with improved pain control throughout the shift  Outcome: Progressing  Goal: Walks with improved pain control throughout the shift  Outcome: Progressing  Goal: Performs ADL's with improved pain control throughout shift  Outcome: Progressing  Goal: Participates in PT with improved pain control throughout the shift  Outcome: Progressing  Goal: Free from opioid side effects throughout the shift  Outcome: Progressing  Goal: Free from acute confusion related to pain meds throughout the shift  Outcome: Progressing     Problem: Fall/Injury  Goal: Not fall by end of shift  Outcome: Progressing  Goal: Be free from injury by end of the shift  Outcome: Progressing     Problem: Respiratory  Goal: Clear secretions with interventions this shift  Outcome: Progressing  Goal: Minimize anxiety/maximize coping throughout shift  Outcome: Progressing  Goal: No signs of  respiratory distress (eg. Use of accessory muscles. Peds grunting)  Outcome: Progressing  Goal: Patent airway maintained this shift  Outcome: Progressing  Goal: Wean oxygen to maintain O2 saturation per order/standard this shift  Outcome: Progressing  Goal: Increase self care and/or family involvement in next 24 hours  Outcome: Progressing

## 2025-01-31 NOTE — PROGRESS NOTES
01/31/25 1400   Discharge Planning   Living Arrangements Spouse/significant other   Support Systems Spouse/significant other   Assistance Needed skilled, PT/OT   Type of Residence Private residence   Do you have animals or pets at home? Yes   Type of Animals or Pets dog   Who is requesting discharge planning? Provider   Home or Post Acute Services Post acute facilities (Rehab/SNF/etc)   Type of Post Acute Facility Services Skilled nursing   Expected Discharge Disposition SNF   Does the patient need discharge transport arranged? Yes   RoundTrip coordination needed? Yes   Has discharge transport been arranged? No   What day is the transport expected? 02/04/25   What time is the transport expected? 1000   Financial Resource Strain   How hard is it for you to pay for the very basics like food, housing, medical care, and heating? Somewhat   Housing Stability   In the last 12 months, was there a time when you were not able to pay the mortgage or rent on time? N   In the past 12 months, how many times have you moved where you were living? 0   At any time in the past 12 months, were you homeless or living in a shelter (including now)? N   Transportation Needs   In the past 12 months, has lack of transportation kept you from medical appointments or from getting medications? yes   In the past 12 months, has lack of transportation kept you from meetings, work, or from getting things needed for daily living? Yes   Patient Choice   Provider Choice list and CMS website (https://medicare.gov/care-compare#search) for post-acute Quality and Resource Measure Data were provided and reviewed with: Family;Patient   Patient / Family choosing to utilize agency / facility established prior to hospitalization Yes     SW attempted to meet with pt but Respiratory was in the room.  Calls to pt spouse Corinne went unanswered.   SW will attempt again.  SW will follow. Hugo Guzman [facility]    01/31/2025 6522  SW called pt spouse  Corinne and left a voicemail asking for a call back.  SHASHI offered to send Corinne a SNF choice list if she would like to give SW her email address.   SHASHI will follow. Hugo Guzman [facility]

## 2025-01-31 NOTE — PROGRESS NOTES
"  Ear Nose & Throat Progress Note           Sim Guaman is a 44 y.o. male on day 3 of admission presenting with Cancer of soft palate (Multi). No acute events overnight. Flap checks stable.    Subjective   Patient doing well; His pain and anxiety are both better controlled. Supporctive oncology continues to follow the patient.     Objective   Physical Exam  Vitals reviewed in EMR  Gen: NAD, AOx3, resting comfortably in bed  Eyes: EOMI, sclera clear, PERRL  Ears: Normal external ears bilaterally  Nose: No rhinorrhea; anterior nares clear  Oral Cavity: All intraoral incisions c/d/i without evidence of dehiscence  -Flap soft and pink with good capillary refill  Head: normocephalic, atraumatic  Face/Neck:All incisions c/d/i, neck soft and flat without evidence of hematoma or fluid collection  -Internal doppler with strong arterial signal  -Trach in midline position without bleeding or thick tracheal secretions  Resp: 6.0 cuffed Shiley, cuff up, no labored breathing on   Cards: RRR on Doppler  Gastro: Soft, non-distended,  PEG tube in place  : Voiding clear yellow urine  MSK: left arm warm with intact movement and sensation  -left thigh skin graft donor site with tegaderm in place  Psych: Appropriate mood and affect  Drains: All drains in place and holding suction with serosanguinous drainage (stripped)    Last Recorded Vitals  Blood pressure 107/62, pulse 82, temperature 36.3 °C (97.4 °F), temperature source Temporal, resp. rate 16, height 1.702 m (5' 7\"), weight 58.4 kg (128 lb 12 oz), SpO2 95%.  Intake/Output last 3 Shifts:  I/O last 3 completed shifts:  In: 3321.4 (56.9 mL/kg) [I.V.:2184.4 (37.4 mL/kg); NG/GT:730; IV Piggyback:407]  Out: 1246 (21.3 mL/kg) [Urine:700 (0.3 mL/kg/hr); Drains:546]  Weight: 58.4 kg     Relevant Results  Results for orders placed or performed during the hospital encounter of 01/28/25 (from the past 24 hours)   Renal Function Panel   Result Value Ref Range    Glucose 99 74 - " 99 mg/dL    Sodium 138 136 - 145 mmol/L    Potassium 3.6 3.5 - 5.3 mmol/L    Chloride 106 98 - 107 mmol/L    Bicarbonate 26 21 - 32 mmol/L    Anion Gap 10 10 - 20 mmol/L    Urea Nitrogen 8 6 - 23 mg/dL    Creatinine 0.53 0.50 - 1.30 mg/dL    eGFR >90 >60 mL/min/1.73m*2    Calcium 8.0 (L) 8.6 - 10.6 mg/dL    Phosphorus 2.6 2.5 - 4.9 mg/dL    Albumin 3.0 (L) 3.4 - 5.0 g/dL   CBC   Result Value Ref Range    WBC 10.8 4.4 - 11.3 x10*3/uL    nRBC 0.0 0.0 - 0.0 /100 WBCs    RBC 2.66 (L) 4.50 - 5.90 x10*6/uL    Hemoglobin 7.5 (L) 13.5 - 17.5 g/dL    Hematocrit 23.1 (L) 41.0 - 52.0 %    MCV 87 80 - 100 fL    MCH 28.2 26.0 - 34.0 pg    MCHC 32.5 32.0 - 36.0 g/dL    RDW 24.2 (H) 11.5 - 14.5 %    Platelets 327 150 - 450 x10*3/uL   Magnesium   Result Value Ref Range    Magnesium 1.94 1.60 - 2.40 mg/dL      No results found.     Scheduled medications  acetaminophen, 1,000 mg, g-tube, q8h ELANA  aspirin, 325 mg, g-tube, Daily  enoxaparin, 40 mg, subcutaneous, q24h  esomeprazole, 40 mg, g-tube, Daily before breakfast  gabapentin, 300 mg, g-tube, Nightly  hydrogen peroxide, 1 Application, Topical, TID  oxygen, , inhalation, Continuous - Inhalation  polyethylene glycol, 17 g, g-tube, Daily  potassium chloride, 40 mEq, g-tube, Once  potassium, sodium phosphates, 1 packet, g-tube, 4x daily  senna, 10 mL, g-tube, BID  white petrolatum, 1 Application, Topical, TID      Continuous medications  HYDROmorphone,       PRN medications  PRN medications: clonazePAM, naloxone, ondansetron                         Assessment/Plan   Assessment & Plan  Cancer of soft palate (Multi)    Assessment:  Sim Guaman is a 44 year old male with a diagnosis of Oropharyngeal SCC with known neck metastasis statu post Direct Laryngoscopy, tracheotomy, PEG (Akil), pharyngectomy, right parasymphyseal mandibulotomy with recon bar, bilateral Selective Neck Dissection levels I-IV, excision of R retropharyngeal Lymph Node, reconconstruction with Left Radial  Forearm Free Flap, and Split Thickness Skin Graft on 1/29/2025  with Dr. Mclaughlin and Dr. Ferguson.      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  Cervical Lymph Node Mets  Nicotine Dependence  Acute Blood Loss Anemia  Dysphagia  Severe Anxiety  Agitation  Tachycardia, self resolves when patient is clam  Hypokalemia  Hypomagnesia  Hypophosphatemia  Acute  on Chronic Pain         Plan:  -Flap Checks: q6hrs ENT team checks and nursing per flap protocol  -Drains: Monitor output  -Analgesia:  Dilaudid PCA, Scheduled Acetaminophen, Gabapentin 300mg Hs  -Neuro: Restarted home clonazepam, Consult Supportive Oncology   -FEN: mIVFs, NPO, TF advancing to goal ratemonitor and replete electrolytes as needed  -Pulm: s/p trach, humidified air at all times, standard trach care/suctioning/teaching; wean oxygen to room air, Keep cuff up for 5 days  -ID: Unasyn 3g Q6hrs x 1day, stop date 1/29->Complete  -Cardiac: Vitals per ENT free flap protocol then transition to Q4hr vitals; Aspirin 325mg daily  -Heme: Trending H/H->stable  -Endo: No current interventions  -GI: Bowel regimen, PPI,  and PRN anti-emetic  -: Abrams catheter discontinued, afternoon void check->voiding  -Steroids/Special: Incision and oral care per ENT free flap protocol  -Embolic PPx: SQLovenox, SCDs while in bed  -Dispo: [facility]. PT/OT ordered; Discharge planning for POD 6 home with home care vs skilled nursing facility              -Dispo planning will difficult secondary to patient's insurance & location; it is true for both Home care & SNF        All plans discussed with attendings after morning rounds.       I spent 35 minutes in the professional and overall care of this patient.    Ligia Gordon APRN, CNP  Certified Family Nurse Practitioner  Nurse Practitioner III  Department of Otolaryngology: Head & Neck Surgery  Personal Pager [phone]  ENT Team  Head and Neck Phone: [phone]

## 2025-02-01 LAB
ALBUMIN SERPL BCP-MCNC: 3 G/DL (ref 3.4–5)
ANION GAP SERPL CALC-SCNC: 10 MMOL/L (ref 10–20)
BUN SERPL-MCNC: 6 MG/DL (ref 6–23)
CALCIUM SERPL-MCNC: 8.4 MG/DL (ref 8.6–10.6)
CHLORIDE SERPL-SCNC: 104 MMOL/L (ref 98–107)
CO2 SERPL-SCNC: 27 MMOL/L (ref 21–32)
CREAT SERPL-MCNC: 0.59 MG/DL (ref 0.5–1.3)
EGFRCR SERPLBLD CKD-EPI 2021: >90 ML/MIN/1.73M*2
ERYTHROCYTE [DISTWIDTH] IN BLOOD BY AUTOMATED COUNT: 24.5 % (ref 11.5–14.5)
ERYTHROCYTE [DISTWIDTH] IN BLOOD BY AUTOMATED COUNT: 24.6 % (ref 11.5–14.5)
GLUCOSE SERPL-MCNC: 90 MG/DL (ref 74–99)
HCT VFR BLD AUTO: 19.2 % (ref 41–52)
HCT VFR BLD AUTO: 22.1 % (ref 41–52)
HGB BLD-MCNC: 6 G/DL (ref 13.5–17.5)
HGB BLD-MCNC: 7.2 G/DL (ref 13.5–17.5)
MAGNESIUM SERPL-MCNC: 1.84 MG/DL (ref 1.6–2.4)
MCH RBC QN AUTO: 27.6 PG (ref 26–34)
MCH RBC QN AUTO: 28.7 PG (ref 26–34)
MCHC RBC AUTO-ENTMCNC: 31.3 G/DL (ref 32–36)
MCHC RBC AUTO-ENTMCNC: 32.6 G/DL (ref 32–36)
MCV RBC AUTO: 88 FL (ref 80–100)
MCV RBC AUTO: 89 FL (ref 80–100)
NRBC BLD-RTO: 0 /100 WBCS (ref 0–0)
NRBC BLD-RTO: 0 /100 WBCS (ref 0–0)
PHOSPHATE SERPL-MCNC: 2.3 MG/DL (ref 2.5–4.9)
PLATELET # BLD AUTO: 306 X10*3/UL (ref 150–450)
PLATELET # BLD AUTO: 352 X10*3/UL (ref 150–450)
POTASSIUM SERPL-SCNC: 4.2 MMOL/L (ref 3.5–5.3)
RBC # BLD AUTO: 2.17 X10*6/UL (ref 4.5–5.9)
RBC # BLD AUTO: 2.51 X10*6/UL (ref 4.5–5.9)
SODIUM SERPL-SCNC: 137 MMOL/L (ref 136–145)
WBC # BLD AUTO: 12.3 X10*3/UL (ref 4.4–11.3)
WBC # BLD AUTO: 9.4 X10*3/UL (ref 4.4–11.3)

## 2025-02-01 PROCEDURE — 36415 COLL VENOUS BLD VENIPUNCTURE: CPT | Performed by: STUDENT IN AN ORGANIZED HEALTH CARE EDUCATION/TRAINING PROGRAM

## 2025-02-01 PROCEDURE — 80069 RENAL FUNCTION PANEL: CPT | Performed by: STUDENT IN AN ORGANIZED HEALTH CARE EDUCATION/TRAINING PROGRAM

## 2025-02-01 PROCEDURE — 99233 SBSQ HOSP IP/OBS HIGH 50: CPT | Performed by: NURSE PRACTITIONER

## 2025-02-01 PROCEDURE — 2060000001 HC INTERMEDIATE ICU ROOM DAILY

## 2025-02-01 PROCEDURE — 86901 BLOOD TYPING SEROLOGIC RH(D): CPT

## 2025-02-01 PROCEDURE — 2500000001 HC RX 250 WO HCPCS SELF ADMINISTERED DRUGS (ALT 637 FOR MEDICARE OP): Mod: SE | Performed by: STUDENT IN AN ORGANIZED HEALTH CARE EDUCATION/TRAINING PROGRAM

## 2025-02-01 PROCEDURE — 85027 COMPLETE CBC AUTOMATED: CPT

## 2025-02-01 PROCEDURE — 85027 COMPLETE CBC AUTOMATED: CPT | Performed by: STUDENT IN AN ORGANIZED HEALTH CARE EDUCATION/TRAINING PROGRAM

## 2025-02-01 PROCEDURE — 83735 ASSAY OF MAGNESIUM: CPT | Performed by: STUDENT IN AN ORGANIZED HEALTH CARE EDUCATION/TRAINING PROGRAM

## 2025-02-01 PROCEDURE — 2500000001 HC RX 250 WO HCPCS SELF ADMINISTERED DRUGS (ALT 637 FOR MEDICARE OP): Mod: SE | Performed by: NURSE PRACTITIONER

## 2025-02-01 PROCEDURE — 2500000004 HC RX 250 GENERAL PHARMACY W/ HCPCS (ALT 636 FOR OP/ED): Mod: SE

## 2025-02-01 PROCEDURE — 2500000005 HC RX 250 GENERAL PHARMACY W/O HCPCS: Mod: SE | Performed by: STUDENT IN AN ORGANIZED HEALTH CARE EDUCATION/TRAINING PROGRAM

## 2025-02-01 PROCEDURE — 2500000004 HC RX 250 GENERAL PHARMACY W/ HCPCS (ALT 636 FOR OP/ED): Mod: SE | Performed by: STUDENT IN AN ORGANIZED HEALTH CARE EDUCATION/TRAINING PROGRAM

## 2025-02-01 PROCEDURE — 2500000005 HC RX 250 GENERAL PHARMACY W/O HCPCS: Mod: SE

## 2025-02-01 PROCEDURE — 2500000004 HC RX 250 GENERAL PHARMACY W/ HCPCS (ALT 636 FOR OP/ED): Mod: JZ,SE | Performed by: NURSE PRACTITIONER

## 2025-02-01 PROCEDURE — 36415 COLL VENOUS BLD VENIPUNCTURE: CPT

## 2025-02-01 PROCEDURE — 86923 COMPATIBILITY TEST ELECTRIC: CPT

## 2025-02-01 RX ORDER — MAGNESIUM SULFATE HEPTAHYDRATE 40 MG/ML
2 INJECTION, SOLUTION INTRAVENOUS ONCE
Status: COMPLETED | OUTPATIENT
Start: 2025-02-01 | End: 2025-02-01

## 2025-02-01 RX ADMIN — GABAPENTIN 300 MG: 250 SOLUTION ORAL at 20:57

## 2025-02-01 RX ADMIN — CLONAZEPAM 1 MG: 0.5 TABLET ORAL at 21:18

## 2025-02-01 RX ADMIN — HYDROGEN PEROXIDE 1 APPLICATION: 30 SOLUTION TOPICAL at 20:58

## 2025-02-01 RX ADMIN — OXYCODONE HYDROCHLORIDE 25 MG: 5 SOLUTION ORAL at 10:35

## 2025-02-01 RX ADMIN — PETROLATUM 1 APPLICATION: 1 OINTMENT TOPICAL at 20:58

## 2025-02-01 RX ADMIN — ENOXAPARIN SODIUM 40 MG: 100 INJECTION SUBCUTANEOUS at 08:04

## 2025-02-01 RX ADMIN — ESOMEPRAZOLE MAGNESIUM 40 MG: 40 FOR SUSPENSION ORAL at 05:19

## 2025-02-01 RX ADMIN — HYDROMORPHONE HYDROCHLORIDE 1 MG: 1 INJECTION, SOLUTION INTRAMUSCULAR; INTRAVENOUS; SUBCUTANEOUS at 15:12

## 2025-02-01 RX ADMIN — HYDROMORPHONE HYDROCHLORIDE 1 MG: 1 INJECTION, SOLUTION INTRAMUSCULAR; INTRAVENOUS; SUBCUTANEOUS at 20:57

## 2025-02-01 RX ADMIN — MAGNESIUM SULFATE HEPTAHYDRATE 2 G: 40 INJECTION, SOLUTION INTRAVENOUS at 11:05

## 2025-02-01 RX ADMIN — SODIUM PHOSPHATE, MONOBASIC, MONOHYDRATE AND SODIUM PHOSPHATE, DIBASIC, ANHYDROUS 15 MMOL: 142; 276 INJECTION, SOLUTION INTRAVENOUS at 15:30

## 2025-02-01 RX ADMIN — POLYETHYLENE GLYCOL 3350 17 G: 17 POWDER, FOR SOLUTION ORAL at 08:07

## 2025-02-01 RX ADMIN — HYDROMORPHONE HYDROCHLORIDE 1 MG: 1 INJECTION, SOLUTION INTRAMUSCULAR; INTRAVENOUS; SUBCUTANEOUS at 02:01

## 2025-02-01 RX ADMIN — ACETAMINOPHEN 1000 MG: 160 SOLUTION ORAL at 13:04

## 2025-02-01 RX ADMIN — OXYCODONE HYDROCHLORIDE 25 MG: 5 SOLUTION ORAL at 22:45

## 2025-02-01 RX ADMIN — SENNA 10 ML: 417.12 SYRUP ORAL at 08:06

## 2025-02-01 RX ADMIN — HYDROMORPHONE HYDROCHLORIDE 1 MG: 1 INJECTION, SOLUTION INTRAMUSCULAR; INTRAVENOUS; SUBCUTANEOUS at 05:19

## 2025-02-01 RX ADMIN — HYDROGEN PEROXIDE 1 APPLICATION: 30 SOLUTION TOPICAL at 08:05

## 2025-02-01 RX ADMIN — Medication 21 PERCENT: at 20:58

## 2025-02-01 RX ADMIN — OXYCODONE HYDROCHLORIDE 25 MG: 5 SOLUTION ORAL at 04:12

## 2025-02-01 RX ADMIN — PETROLATUM 1 APPLICATION: 1 OINTMENT TOPICAL at 09:34

## 2025-02-01 RX ADMIN — Medication 30 PERCENT: at 11:48

## 2025-02-01 RX ADMIN — ACETAMINOPHEN 1000 MG: 160 SOLUTION ORAL at 22:45

## 2025-02-01 RX ADMIN — OXYCODONE HYDROCHLORIDE 25 MG: 5 SOLUTION ORAL at 13:32

## 2025-02-01 RX ADMIN — HYDROGEN PEROXIDE 1 APPLICATION: 30 SOLUTION TOPICAL at 16:35

## 2025-02-01 RX ADMIN — ACETAMINOPHEN 1000 MG: 160 SOLUTION ORAL at 05:19

## 2025-02-01 RX ADMIN — OXYCODONE HYDROCHLORIDE 25 MG: 5 SOLUTION ORAL at 00:53

## 2025-02-01 RX ADMIN — PETROLATUM 1 APPLICATION: 1 OINTMENT TOPICAL at 16:35

## 2025-02-01 RX ADMIN — OXYCODONE HYDROCHLORIDE 25 MG: 5 SOLUTION ORAL at 07:21

## 2025-02-01 RX ADMIN — ASPIRIN 325 MG ORAL TABLET 325 MG: 325 PILL ORAL at 08:07

## 2025-02-01 RX ADMIN — OXYCODONE HYDROCHLORIDE 25 MG: 5 SOLUTION ORAL at 18:25

## 2025-02-01 RX ADMIN — CLONAZEPAM 1 MG: 0.5 TABLET ORAL at 12:58

## 2025-02-01 RX ADMIN — CLONAZEPAM 1 MG: 0.5 TABLET ORAL at 05:19

## 2025-02-01 ASSESSMENT — COGNITIVE AND FUNCTIONAL STATUS - GENERAL
WALKING IN HOSPITAL ROOM: A LITTLE
PERSONAL GROOMING: A LITTLE
HELP NEEDED FOR BATHING: A LITTLE
STANDING UP FROM CHAIR USING ARMS: A LITTLE
CLIMB 3 TO 5 STEPS WITH RAILING: A LITTLE
DRESSING REGULAR LOWER BODY CLOTHING: A LITTLE
MOVING TO AND FROM BED TO CHAIR: A LITTLE
TURNING FROM BACK TO SIDE WHILE IN FLAT BAD: A LITTLE
DRESSING REGULAR UPPER BODY CLOTHING: A LITTLE
DAILY ACTIVITIY SCORE: 18
TOILETING: A LITTLE
EATING MEALS: A LITTLE
MOBILITY SCORE: 19

## 2025-02-01 ASSESSMENT — PAIN SCALES - GENERAL
PAINLEVEL_OUTOF10: 7
PAINLEVEL_OUTOF10: 7
PAINLEVEL_OUTOF10: 10 - WORST POSSIBLE PAIN
PAINLEVEL_OUTOF10: 7
PAINLEVEL_OUTOF10: 7
PAINLEVEL_OUTOF10: 10 - WORST POSSIBLE PAIN
PAINLEVEL_OUTOF10: 7

## 2025-02-01 ASSESSMENT — PAIN DESCRIPTION - LOCATION: LOCATION: FACE

## 2025-02-01 ASSESSMENT — PAIN - FUNCTIONAL ASSESSMENT
PAIN_FUNCTIONAL_ASSESSMENT: 0-10
PAIN_FUNCTIONAL_ASSESSMENT: 0-10

## 2025-02-01 NOTE — CARE PLAN
The patient's goals for the shift include      The clinical goals for the shift include pt will remain free from falls    Problem: Nutrition  Goal: Nutrient intake appropriate for maintaining nutritional needs  Outcome: Progressing     Problem: Discharge Planning  Goal: Discharge to home or other facility with appropriate resources  Outcome: Progressing

## 2025-02-01 NOTE — PROGRESS NOTES
Sim Guaman is a 44 y.o. male on day 4 of admission presenting with Cancer of soft palate (Multi).  SUPPORTIVE AND PALLIATIVE ONCOLOGY PROGRESS NOTE      SERVICE DATE: 2/1/2025      SUBJECTIVE:  Interval Events:  Asked by primary team to see pt for complaints of uncontrolled pain.  Upon entering room pt was sleeping comfortably in bedside chair.  He awakened easily.  Spent first 15 min talking about frustrations with his situation and asking when he can go home.  His wife is unable to visit him in hospital.  They have 2 young children.  He is feeling overwhelmed and alone.  Pt did not bring up pain- once I did he was hyperfocused on his regimen.  We discussed maintaining current regimen and not increasing anything.  Pt expressed understanding.    Pain Assessment:  Location: Bilateral and anterior neck  Duration: Constant  Characteristics:  Rating: severe  Descriptors: Aching, throbbing, sharp, poking  Aggravating: Movement, swallowing, coughing   Relieving: Analgesics--opioids, positioning, and modifying activity  Intolerances: None  Interference with Function: None    Opioid Requirements  Past 24 h opioid requirements (1/31/25 at 0800 to 02/01/25 at 0800):   Hydromorphone 1 mg IV x 6 doses = 6 mg = 96 OME  Oxycodone IR 25 mg PO x 8 doses = 200 mg = 300 OME  Total 24h OME use:  396    Symptom Assessment:  Anxiety very much   Depressive symptoms very much   Nausea none  Constipation none    Information obtained from: chart review, interview of patient, discussion with RN, and discussion with primary team  ______________________________________________________________________        Objective       Lab Results   Component Value Date    WBC 12.3 (H) 02/01/2025    HGB 7.2 (L) 02/01/2025    HCT 22.1 (L) 02/01/2025    MCV 88 02/01/2025     02/01/2025      Lab Results   Component Value Date    GLUCOSE 90 02/01/2025    CALCIUM 8.4 (L) 02/01/2025     02/01/2025    K 4.2 02/01/2025    CO2 27  02/01/2025     02/01/2025    BUN 6 02/01/2025    CREATININE 0.59 02/01/2025     Lab Results   Component Value Date    ALT 14 01/10/2025    AST 28 01/10/2025    ALKPHOS 72 01/10/2025    BILITOT 0.2 01/10/2025     Estimated Creatinine Clearance: 125 mL/min (by C-G formula based on SCr of 0.59 mg/dL).       Scheduled medications   acetaminophen, 1,000 mg, g-tube, q8h ELANA  aspirin, 325 mg, g-tube, Daily  enoxaparin, 40 mg, subcutaneous, q24h  esomeprazole, 40 mg, g-tube, Daily before breakfast  gabapentin, 300 mg, g-tube, Nightly  hydrogen peroxide, 1 Application, Topical, TID  oxygen, , inhalation, Continuous - Inhalation  polyethylene glycol, 17 g, g-tube, Daily  senna, 10 mL, g-tube, BID  sodium phosphate, 15 mmol, intravenous, Once  white petrolatum, 1 Application, Topical, TID      Continuous medications     PRN medications  clonazePAM, 1 mg, 4x daily PRN  HYDROmorphone, 1 mg, q2h PRN  naloxone, 0.2 mg, q5 min PRN  ondansetron, 4 mg, q8h PRN  oxyCODONE, 15 mg, q3h PRN  oxyCODONE, 25 mg, q3h PRN                    PHYSICAL EXAMINATION:  Vital Signs:   Vital signs reviewed  Vitals:    02/01/25 0928   BP: 113/71   Pulse: 82   Resp: 16   Temp: 37 °C (98.6 °F)   SpO2: 97%     Pain Score: 7     Physical Exam  Chronically ill-appearing  M seated in chair, NAD  A&O x 3, pleasant and cooperative with interview & exam  Communicates via mouthing words and writing  Breathing comfortably on trach mask  3 surgical drains- serosanguinous fluid  Abd soft, NTND  No edema in ext      ASSESSMENT/PLAN:  Sim Guaman is a 44 y.o. 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 PMHx significant for anxiety, hypothyroidism, and arthritis. Now s/p DL, tracheostomy, PEG, pharyngectomy, R parasymphyseal mandibulotomy with recon bar, bilat SND 1-4, excision of R retropharyngeal LN, recon with L RFFF, and STSG on 1/28/25 with with Dr. Mclaughlin and Dr. Ferguson. Supportive and  Palliative Oncology is following for assistance with pain and anxiety management.      Neoplasm Related Pain  Post Operative Pain  Bilateral and anterior neck pain related to known malignancy. C/b accompanying headaches.   Acute post-operative pain 2/2 DL, tracheostomy, PEG, pharyngectomy, R parasymphyseal mandibulotomy with recon bar, bilat SND 1-4, excision of R retropharyngeal LN, recon with L RFFF, and STSG on 1/28/25.  Type: Somatic, possibly neuropathic  Pain control: Well controlled  Home regimen: acetaminophen, oxycodone IR 15mg q4h PRN   Intolerances: None  Previously tried: hydromorphone IR PO, Percocet  Total OME usage for the past 24 hours: 396 OME  Reviewed: (1/10/25) Hepatic function historically WNL.  Renal function WNL.   Continue oxycodone solution 15mg via PEG q3h PRN for moderate pain  Continue oxycodone solution 25mg via PEG q3h PRN for severe pain  Continue hydromorphone 1mg IV q2h PRN for breakthrough pain  Continue scheduled acetaminophen 1g oral liquid via PEG q8h  Continue gabapentin 300mg PO once daily HS--will assist with neuropathic pain and possibly aide mood     Nausea  At risk for nausea and vomiting related to recent surgery and opioid use   Home regimen: None  No recent EKG on file in EMR.  PPI per primary   Continue ondansetron 4mg IV q8h PRN for n/v, first line [started by primary]     Constipation  At risk for constipation related to recent surgery and opioid use, currently not constipated.  Usual bowel pattern: Every day  Home regimen: None  LBM: 1/31/25 AM per pt  Continue scheduled Miralax 17g via PEG once daily  Continue scheduled senna syrup 10ml via PEG BID  Goal to have BM without straining q48-72h, adjust regimen as needed  Encourage mobility as tolerated, PT/OT following     Altered Mood  Acute on chronic anxiety related to pain and hospitalization.   Sub-optimally controlled.   Home regimen: clonazepam 1mg q6h PRN, had not been taking  buspirone at home  Continue  clonazepam 1mg via PEG q6h PRN for anxiety  Spiritual Care following for support  Music therapy consult placed     Altered Nutrition  Altered nutrition related to malignancy, recent surgery s/p trache/PEG, new TF initiation.   Weight loss: None  Home regimen: None  Consider consulting Nutrition if needed  Currently on trickle TF--tolerating     Medical Decision Making/Goals of Care/Advance Care Planning:  (1/29/25) Complete GOC/ACP planning not completed due to uncontrolled pain and emotional symptom burden. Confirmed surrogate decision maker and Full Code status with pt.   2/1/25:  Asked by primary team to see pt for complaints of uncontrolled pain.  Upon entering room pt was sleeping comfortably in bedside chair.  He awakened easily.  Spent first 15 min talking about frustrations with his situation and asking when he can go home.  His wife is unable to visit him in hospital.  They have 2 young children.  He is feeling overwhelmed and alone.  Pt did not bring up pain- once I did he was hyperfocused on his regimen.  We discussed maintaining current regimen and not increasing anything.  Pt expressed understanding.    Advance Directives  Existence of Advance Directives: None  Decision maker: Surrogate decision maker is pt's wifeCorinne ([phone])  Code Status: Full Code     Disposition:  Please start the process of having prior authorization with meds to beds deliver medications to patient prior to discharge via [facility] pharmacy. Prescriptions will need to be sent 48-72 hours prior to discharge so that a prior authorization can be completed.      Discharge date pending resolution of acute hospital issues and symptom control.  Per discussion with primary team, due to geographic location of pt's home, likely that pt will continue following with his existing PCP for pain and symptom management after discharge. PCP was previously managing pt's pain/symptom regimen.     Supportive and Palliative Oncology  encounter:  Spoke with patient at bedside  Emotional support provided    Signature and billing:  Medical complexity was high level due to due to complexity of problems, extensive data review, and high risk of management/treatment.    DATA   Diagnostic tests and information reviewed for today's visit:  Conversation with primary team, Most recent labs and imaging results, Medications       Some elements copied from PAULINA Cesar CNP note on 1/31/25, the elements have been updated and all reflect current decision making from today, 2/1/2025.    Plan of Care discussed with: Provider, RN, Patient    Thank you for asking Supportive and Palliative Oncology to assist with care of this patient.  Recommendations will be communicated back to the consulting service by way of shared electronic medical record/secure chat/email or face-to-face.   We will continue to follow.  Please contact us for additional questions or concerns.      SIGNATURE: MEGA Hogan  PAGER/CONTACT:  Contact information:  Supportive and Palliative Oncology  Monday-Friday 8 AM-5 PM  Epic Secure chat or pager [phone].  After hours and weekends:  pager [phone]

## 2025-02-01 NOTE — PROGRESS NOTES
"  Ear Nose & Throat Progress Note           Sim Guaman is a 44 y.o. male on day 4 of admission presenting with Cancer of soft palate (Multi). No acute events overnight. Flap checks stable.    Subjective   No acute events overnight. This morning patient had increased anxiety, which resolved after patient received his pain medication.     Objective   Physical Exam  Vitals reviewed in EMR  Gen: NAD, AOx3, resting comfortably in bed  Eyes: EOMI, sclera clear, PERRL  Ears: Normal external ears bilaterally  Nose: No rhinorrhea; anterior nares clear  Oral Cavity: All intraoral incisions c/d/i without evidence of dehiscence  -Flap soft and pink with good capillary refill  Head: normocephalic, atraumatic  Face/Neck:All incisions c/d/i, neck soft and flat without evidence of hematoma or fluid collection  -Internal doppler with strong arterial signal  -Trach in midline position without bleeding or thick tracheal secretions  Resp: 6.0 cuffed Shiley, cuff up likely down on 2/2, no labored breathing on   Cards: RRR on Doppler  Gastro: Soft, non-distended,  PEG tube in place  : Voiding clear yellow urine  MSK: left arm warm with intact movement and sensation  -left thigh skin graft donor site with tegaderm in place  Psych: Appropriate mood and affect  Drains: All drains in place and holding suction with serosanguinous drainage (stripped)    Last Recorded Vitals  Blood pressure 113/71, pulse 82, temperature 37 °C (98.6 °F), temperature source Temporal, resp. rate 16, height 1.702 m (5' 7\"), weight 58.4 kg (128 lb 12 oz), SpO2 97%.  Intake/Output last 3 Shifts:  I/O last 3 completed shifts:  In: 2873.8 (49.2 mL/kg) [I.V.:712.1 (12.2 mL/kg); NG/GT:1445; IV Piggyback:716.7]  Out: 1045.5 (17.9 mL/kg) [Urine:450 (0.2 mL/kg/hr); Emesis/NG output:180; Drains:415.5]  Weight: 58.4 kg     Relevant Results  Results from last 7 days   Lab Units 02/01/25  0541 01/31/25  0605 01/30/25  1124   WBC AUTO x10*3/uL 12.3* 10.8 14.1* "   HEMOGLOBIN g/dL 7.2* 7.5* 7.5*   PLATELETS AUTO x10*3/uL 352 327 305      Results from last 7 days   Lab Units 02/01/25  0541 01/31/25  0605 01/30/25  1124   SODIUM mmol/L 137 138 138   POTASSIUM mmol/L 4.2 3.6 3.6   CHLORIDE mmol/L 104 106 104   CO2 mmol/L 27 26 30   BUN mg/dL 6 8 8   CREATININE mg/dL 0.59 0.53 0.65   GLUCOSE mg/dL 90 99 82   MAGNESIUM mg/dL 1.84 1.94 2.07   PHOSPHORUS mg/dL 2.3* 2.6 2.4*             Assessment/Plan   Assessment & Plan  Cancer of soft palate (Multi)    Assessment:  Sim Guaman is a 44 year old male with a diagnosis of Oropharyngeal SCC with known neck metastasis statu post Direct Laryngoscopy, tracheotomy, PEG (Akil), pharyngectomy, right parasymphyseal mandibulotomy with recon bar, bilateral Selective Neck Dissection levels I-IV, excision of R retropharyngeal Lymph Node, reconconstruction with Left Radial Forearm Free Flap, and Split Thickness Skin Graft on 1/29/2025  with Dr. Mclaughlin and Dr. Ferguson.      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  Cervical Lymph Node Mets  Nicotine Dependence  Acute Blood Loss Anemia  Dysphagia  Severe Anxiety  Agitation  Tachycardia, self resolves when patient is clam  Hypokalemia  Hypomagnesia  Hypophosphatemia  Acute  on Chronic Pain         Plan:  -Flap Checks: q6hrs ENT team checks and nursing per flap protocol  -Drains: Monitor output  -Analgesia:  Dilaudid PCA, Scheduled Acetaminophen, Gabapentin 300mg Hs, oxy 15/25 every 3 hours  -Neuro: Restarted home clonazepam, Consulted Supportive Oncology   -FEN: mIVFs, NPO, TF advancing to goal rate, monitor and replete electrolytes as needed  -Pulm: s/p trach, humidified air at all times, standard trach care/suctioning/teaching; wean oxygen to room air, Keep cuff up for 5 days  -ID: Unasyn 3g Q6hrs x 1day, stop date 1/29->Complete  -Cardiac: Vitals per ENT free flap protocol then transition to Q4hr vitals; Aspirin 325mg daily  -Heme: Trending H/H-> down to 7.2  -Endo: No current  interventions  -GI: Bowel regimen, PPI,  and PRN anti-emetic  -: Abrams catheter discontinued, afternoon void check->voiding  -Steroids/Special: Incision and oral care per ENT free flap protocol  -Embolic PPx: SQLovenox, SCDs while in bed  -Dispo: [facility]. PT/OT ordered; Discharge planning for POD 6 home with home care vs skilled nursing facility              -Dispo planning difficult secondary to patient's insurance & location; it is true for both Home care & SNF        Delmi Nair MD - PGY1  Otolaryngology - Head & Neck Surgery    ENT Consult pager: [phone]  ENT Peds pager: [phone]  ENT Head & Neck Surgery Phone: [phone]  ENT Subspecialty team (otology, rhinology, laryngology, plastics, sleep):   M-F 6am-5pm- EpicChat or page the resident who wrote the daily note   Nights & weekends- [phone]  ENT Outpatient scheduling number: [phone]  Please Page [phone] or call [phone] if urgent

## 2025-02-01 NOTE — PROGRESS NOTES
02/01/25 1500   Discharge Planning   Living Arrangements Spouse/significant other   Support Systems Spouse/significant other     SW following to assist with discharge planning.  Pt recommended for SNF.  SW has been unable to reach wife. SW met with pt to obtain additional contact information.  While meeting with pt, pts wife called pt.  SW spoke with pts wife Casandra.  Casandra requested a list be emailed to [email or web address].  SW asked Casandra to select at least 5 SNF's.  List emailed to wife from [facility]. SW to continue to follow and will follow up for choices.  SW to advise of updates.  Elizabeth Gunsalus LISW-S  Care Transitions Supervisor

## 2025-02-01 NOTE — SIGNIFICANT EVENT
ENT Flap Check    Subjective:  Patient continues to be anxious and is unhappy with his pain control. He is sitting up in the chair. Supportive Oncology continues to follow.    Objective:      1/31/2025     5:52 AM 1/31/2025     9:51 AM 1/31/2025    12:30 PM 1/31/2025     5:00 PM 1/31/2025     8:53 PM 1/31/2025    11:19 PM 2/1/2025     9:28 AM   Vitals   Systolic 96 107 96 96 121 117 113   Diastolic 61 62 60 54 78 75 71   BP Location Right arm Right arm Right arm Right arm Right arm Right arm Right arm   Heart Rate 72 82 80 82 82 85 82   Temp 36.1 °C (97 °F) 36.3 °C (97.4 °F) 36.5 °C (97.7 °F) 36.3 °C (97.4 °F) 36.1 °C (97 °F) 36.2 °C (97.2 °F) 37 °C (98.6 °F)   Resp 16 16 16 16 16 16 16     Gen: NAD, resting in chair  Face/Neck: All incisions c/d/i, neck soft and flat without evidence of hematoma or fluid collection  -Internal doppler with strong arterial signal  -Trach in midline position without bleeding or thick tracheal secretions  Oral Cavity: All intraoral incisions c/d/i without evidence of dehiscence  -Flap soft and pink with good capillary refill  Extremities: left arm warm with intact movement and sensation  -left thigh skin graft donor site with tegaderm in place  Abdomen: PEG in place  Drains: All drains in place and holding suction with serosanguinous drainage (stripped)    Assessment:  S/p DL, trach, PEG (Akil), pharyngectomy, right parasymphyseal mandibulotomy with recon bar, bilat SND 1-4, excision of R retropharyngeal LN, recon with L RFFF, STSG 1/29 with Dr. Mclaughlin and Dr. Ferguson.     -Continue Q6hr ENT flap checks    Delmi Nair MD

## 2025-02-01 NOTE — SIGNIFICANT EVENT
ENT Flap Check    Subjective:  Patient doing well. He is sitting up in the chair. Supportive Oncology continues to follow.    Objective:      1/31/2025     4:13 AM 1/31/2025     5:52 AM 1/31/2025     9:51 AM 1/31/2025    12:30 PM 1/31/2025     5:00 PM 1/31/2025     8:53 PM 1/31/2025    11:19 PM   Vitals   Systolic 90 96 107 96 96 121 117   Diastolic 60 61 62 60 54 78 75   BP Location  Right arm Right arm Right arm Right arm Right arm Right arm   Heart Rate 88 72 82 80 82 82 85   Temp  36.1 °C (97 °F) 36.3 °C (97.4 °F) 36.5 °C (97.7 °F) 36.3 °C (97.4 °F) 36.1 °C (97 °F) 36.2 °C (97.2 °F)   Resp  16 16 16 16 16 16     Gen: NAD, resting comfortably in chair  Face/Neck: All incisions c/d/i, neck soft and flat without evidence of hematoma or fluid collection  -Internal doppler with strong arterial signal  -Trach in midline position without bleeding or thick tracheal secretions  Oral Cavity: All intraoral incisions c/d/i without evidence of dehiscence  -Flap soft and pink with good capillary refill  Extremities: left arm warm with intact movement and sensation  -left thigh skin graft donor site with tegaderm in place  Abdomen: PEG in place  Drains: All drains in place and holding suction with serosanguinous drainage (stripped)    Assessment:  S/p DL, trach, PEG (Akil), pharyngectomy, right parasymphyseal mandibulotomy with recon bar, bilat SND 1-4, excision of R retropharyngeal LN, recon with L RFFF, STSG 1/29 with Dr. Mclaughlin and Dr. Ferguson.     -Continue Q6hr ENT flap checks    Gonzalez Yoo MD

## 2025-02-02 VITALS
RESPIRATION RATE: 18 BRPM | BODY MASS INDEX: 20.21 KG/M2 | TEMPERATURE: 97 F | OXYGEN SATURATION: 98 % | SYSTOLIC BLOOD PRESSURE: 130 MMHG | HEIGHT: 67 IN | HEART RATE: 102 BPM | DIASTOLIC BLOOD PRESSURE: 78 MMHG | WEIGHT: 128.75 LBS

## 2025-02-02 LAB
ABO GROUP (TYPE) IN BLOOD: NORMAL
ALBUMIN SERPL BCP-MCNC: 2.7 G/DL (ref 3.4–5)
ANION GAP SERPL CALC-SCNC: 13 MMOL/L (ref 10–20)
ANTIBODY SCREEN: NORMAL
BLOOD EXPIRATION DATE: NORMAL
BLOOD EXPIRATION DATE: NORMAL
BUN SERPL-MCNC: 5 MG/DL (ref 6–23)
CALCIUM SERPL-MCNC: 7.9 MG/DL (ref 8.6–10.6)
CHLORIDE SERPL-SCNC: 104 MMOL/L (ref 98–107)
CO2 SERPL-SCNC: 27 MMOL/L (ref 21–32)
CREAT SERPL-MCNC: 0.54 MG/DL (ref 0.5–1.3)
DISPENSE STATUS: NORMAL
DISPENSE STATUS: NORMAL
EGFRCR SERPLBLD CKD-EPI 2021: >90 ML/MIN/1.73M*2
ERYTHROCYTE [DISTWIDTH] IN BLOOD BY AUTOMATED COUNT: 21.1 % (ref 11.5–14.5)
GLUCOSE SERPL-MCNC: 104 MG/DL (ref 74–99)
HCT VFR BLD AUTO: 23.7 % (ref 41–52)
HGB BLD-MCNC: 8.4 G/DL (ref 13.5–17.5)
MAGNESIUM SERPL-MCNC: 2.02 MG/DL (ref 1.6–2.4)
MCH RBC QN AUTO: 28.9 PG (ref 26–34)
MCHC RBC AUTO-ENTMCNC: 35.4 G/DL (ref 32–36)
MCV RBC AUTO: 81 FL (ref 80–100)
NRBC BLD-RTO: 0 /100 WBCS (ref 0–0)
PHOSPHATE SERPL-MCNC: 2.6 MG/DL (ref 2.5–4.9)
PLATELET # BLD AUTO: 344 X10*3/UL (ref 150–450)
POTASSIUM SERPL-SCNC: 3.9 MMOL/L (ref 3.5–5.3)
PRODUCT BLOOD TYPE: 9500
PRODUCT BLOOD TYPE: 9500
PRODUCT CODE: NORMAL
PRODUCT CODE: NORMAL
RBC # BLD AUTO: 2.91 X10*6/UL (ref 4.5–5.9)
RH FACTOR (ANTIGEN D): NORMAL
SODIUM SERPL-SCNC: 140 MMOL/L (ref 136–145)
UNIT ABO: NORMAL
UNIT ABO: NORMAL
UNIT NUMBER: NORMAL
UNIT NUMBER: NORMAL
UNIT RH: NORMAL
UNIT RH: NORMAL
UNIT VOLUME: 310
UNIT VOLUME: 350
WBC # BLD AUTO: 11.3 X10*3/UL (ref 4.4–11.3)
XM INTEP: NORMAL
XM INTEP: NORMAL

## 2025-02-02 PROCEDURE — P9040 RBC LEUKOREDUCED IRRADIATED: HCPCS

## 2025-02-02 PROCEDURE — 2500000001 HC RX 250 WO HCPCS SELF ADMINISTERED DRUGS (ALT 637 FOR MEDICARE OP): Mod: SE | Performed by: NURSE PRACTITIONER

## 2025-02-02 PROCEDURE — 36430 TRANSFUSION BLD/BLD COMPNT: CPT

## 2025-02-02 PROCEDURE — 1170000001 HC PRIVATE ONCOLOGY ROOM DAILY

## 2025-02-02 PROCEDURE — 2060000001 HC INTERMEDIATE ICU ROOM DAILY

## 2025-02-02 PROCEDURE — 80069 RENAL FUNCTION PANEL: CPT | Performed by: STUDENT IN AN ORGANIZED HEALTH CARE EDUCATION/TRAINING PROGRAM

## 2025-02-02 PROCEDURE — 83735 ASSAY OF MAGNESIUM: CPT | Performed by: STUDENT IN AN ORGANIZED HEALTH CARE EDUCATION/TRAINING PROGRAM

## 2025-02-02 PROCEDURE — 99024 POSTOP FOLLOW-UP VISIT: CPT | Performed by: OTOLARYNGOLOGY

## 2025-02-02 PROCEDURE — 2500000005 HC RX 250 GENERAL PHARMACY W/O HCPCS: Mod: SE | Performed by: STUDENT IN AN ORGANIZED HEALTH CARE EDUCATION/TRAINING PROGRAM

## 2025-02-02 PROCEDURE — 2500000004 HC RX 250 GENERAL PHARMACY W/ HCPCS (ALT 636 FOR OP/ED): Mod: SE | Performed by: STUDENT IN AN ORGANIZED HEALTH CARE EDUCATION/TRAINING PROGRAM

## 2025-02-02 PROCEDURE — 2500000001 HC RX 250 WO HCPCS SELF ADMINISTERED DRUGS (ALT 637 FOR MEDICARE OP): Mod: SE | Performed by: STUDENT IN AN ORGANIZED HEALTH CARE EDUCATION/TRAINING PROGRAM

## 2025-02-02 PROCEDURE — 85027 COMPLETE CBC AUTOMATED: CPT | Performed by: STUDENT IN AN ORGANIZED HEALTH CARE EDUCATION/TRAINING PROGRAM

## 2025-02-02 PROCEDURE — 36415 COLL VENOUS BLD VENIPUNCTURE: CPT | Performed by: STUDENT IN AN ORGANIZED HEALTH CARE EDUCATION/TRAINING PROGRAM

## 2025-02-02 PROCEDURE — 2500000004 HC RX 250 GENERAL PHARMACY W/ HCPCS (ALT 636 FOR OP/ED): Mod: JZ,SE | Performed by: NURSE PRACTITIONER

## 2025-02-02 RX ORDER — CLONAZEPAM 0.5 MG/1
0.5 TABLET ORAL ONCE
Status: COMPLETED | OUTPATIENT
Start: 2025-02-02 | End: 2025-02-02

## 2025-02-02 RX ADMIN — Medication 28 PERCENT: at 19:10

## 2025-02-02 RX ADMIN — CLONAZEPAM 0.5 MG: 0.5 TABLET ORAL at 13:35

## 2025-02-02 RX ADMIN — OXYCODONE HYDROCHLORIDE 25 MG: 5 SOLUTION ORAL at 16:04

## 2025-02-02 RX ADMIN — ACETAMINOPHEN 1000 MG: 160 SOLUTION ORAL at 22:36

## 2025-02-02 RX ADMIN — HYDROGEN PEROXIDE 1 APPLICATION: 30 SOLUTION TOPICAL at 09:11

## 2025-02-02 RX ADMIN — HYDROMORPHONE HYDROCHLORIDE 1 MG: 1 INJECTION, SOLUTION INTRAMUSCULAR; INTRAVENOUS; SUBCUTANEOUS at 03:16

## 2025-02-02 RX ADMIN — ESOMEPRAZOLE MAGNESIUM 40 MG: 40 FOR SUSPENSION ORAL at 05:33

## 2025-02-02 RX ADMIN — CLONAZEPAM 1 MG: 0.5 TABLET ORAL at 12:15

## 2025-02-02 RX ADMIN — ACETAMINOPHEN 1000 MG: 160 SOLUTION ORAL at 13:35

## 2025-02-02 RX ADMIN — HYDROMORPHONE HYDROCHLORIDE 1 MG: 1 INJECTION, SOLUTION INTRAMUSCULAR; INTRAVENOUS; SUBCUTANEOUS at 01:00

## 2025-02-02 RX ADMIN — ACETAMINOPHEN 1000 MG: 160 SOLUTION ORAL at 05:33

## 2025-02-02 RX ADMIN — ASPIRIN 325 MG ORAL TABLET 325 MG: 325 PILL ORAL at 09:13

## 2025-02-02 RX ADMIN — HYDROMORPHONE HYDROCHLORIDE 1 MG: 1 INJECTION, SOLUTION INTRAMUSCULAR; INTRAVENOUS; SUBCUTANEOUS at 20:47

## 2025-02-02 RX ADMIN — POTASSIUM PHOSPHATE, MONOBASIC POTASSIUM PHOSPHATE, DIBASIC 15 MMOL: 224; 236 INJECTION, SOLUTION, CONCENTRATE INTRAVENOUS at 13:36

## 2025-02-02 RX ADMIN — OXYCODONE HYDROCHLORIDE 25 MG: 5 SOLUTION ORAL at 12:15

## 2025-02-02 RX ADMIN — OXYCODONE HYDROCHLORIDE 25 MG: 5 SOLUTION ORAL at 22:37

## 2025-02-02 RX ADMIN — CLONAZEPAM 1 MG: 0.5 TABLET ORAL at 13:52

## 2025-02-02 RX ADMIN — GABAPENTIN 300 MG: 250 SOLUTION ORAL at 19:29

## 2025-02-02 RX ADMIN — OXYCODONE HYDROCHLORIDE 25 MG: 5 SOLUTION ORAL at 09:11

## 2025-02-02 RX ADMIN — CLONAZEPAM 1 MG: 0.5 TABLET ORAL at 19:29

## 2025-02-02 RX ADMIN — OXYCODONE HYDROCHLORIDE 25 MG: 5 SOLUTION ORAL at 19:29

## 2025-02-02 RX ADMIN — OXYCODONE HYDROCHLORIDE 25 MG: 5 SOLUTION ORAL at 05:33

## 2025-02-02 RX ADMIN — Medication 28 PERCENT: at 08:10

## 2025-02-02 RX ADMIN — OXYCODONE HYDROCHLORIDE 25 MG: 5 SOLUTION ORAL at 02:03

## 2025-02-02 RX ADMIN — HYDROMORPHONE HYDROCHLORIDE 1 MG: 1 INJECTION, SOLUTION INTRAMUSCULAR; INTRAVENOUS; SUBCUTANEOUS at 13:36

## 2025-02-02 RX ADMIN — HYDROMORPHONE HYDROCHLORIDE 1 MG: 1 INJECTION, SOLUTION INTRAMUSCULAR; INTRAVENOUS; SUBCUTANEOUS at 10:36

## 2025-02-02 RX ADMIN — ENOXAPARIN SODIUM 40 MG: 100 INJECTION SUBCUTANEOUS at 09:11

## 2025-02-02 RX ADMIN — HYDROMORPHONE HYDROCHLORIDE 1 MG: 1 INJECTION, SOLUTION INTRAMUSCULAR; INTRAVENOUS; SUBCUTANEOUS at 06:56

## 2025-02-02 RX ADMIN — PETROLATUM 1 APPLICATION: 1 OINTMENT TOPICAL at 09:11

## 2025-02-02 RX ADMIN — CLONAZEPAM 1 MG: 0.5 TABLET ORAL at 02:26

## 2025-02-02 ASSESSMENT — PAIN - FUNCTIONAL ASSESSMENT
PAIN_FUNCTIONAL_ASSESSMENT: 0-10
PAIN_FUNCTIONAL_ASSESSMENT: UNABLE TO SELF-REPORT
PAIN_FUNCTIONAL_ASSESSMENT: 0-10

## 2025-02-02 ASSESSMENT — COGNITIVE AND FUNCTIONAL STATUS - GENERAL
MOVING TO AND FROM BED TO CHAIR: A LITTLE
HELP NEEDED FOR BATHING: A LITTLE
TURNING FROM BACK TO SIDE WHILE IN FLAT BAD: A LITTLE
CLIMB 3 TO 5 STEPS WITH RAILING: A LITTLE
DRESSING REGULAR UPPER BODY CLOTHING: A LITTLE
WALKING IN HOSPITAL ROOM: A LITTLE
STANDING UP FROM CHAIR USING ARMS: A LITTLE
EATING MEALS: A LITTLE
DRESSING REGULAR LOWER BODY CLOTHING: A LITTLE
DAILY ACTIVITIY SCORE: 18
PERSONAL GROOMING: A LITTLE
TOILETING: A LITTLE
MOBILITY SCORE: 19

## 2025-02-02 ASSESSMENT — PAIN SCALES - GENERAL
PAINLEVEL_OUTOF10: 6
PAINLEVEL_OUTOF10: 8
PAINLEVEL_OUTOF10: 10 - WORST POSSIBLE PAIN
PAINLEVEL_OUTOF10: 8
PAINLEVEL_OUTOF10: 10 - WORST POSSIBLE PAIN
PAINLEVEL_OUTOF10: 6
PAINLEVEL_OUTOF10: 8
PAINLEVEL_OUTOF10: 10 - WORST POSSIBLE PAIN
PAINLEVEL_OUTOF10: 10 - WORST POSSIBLE PAIN
PAINLEVEL_OUTOF10: 8
PAINLEVEL_OUTOF10: 8
PAINLEVEL_OUTOF10: 10 - WORST POSSIBLE PAIN
PAINLEVEL_OUTOF10: 8
PAINLEVEL_OUTOF10: 10 - WORST POSSIBLE PAIN
PAINLEVEL_OUTOF10: 8
PAINLEVEL_OUTOF10: 8

## 2025-02-02 ASSESSMENT — PAIN DESCRIPTION - LOCATION
LOCATION: FACE
LOCATION: FACE

## 2025-02-02 NOTE — PROGRESS NOTES
"  Ear Nose & Throat Progress Note           Sim Guaman is a 44 y.o. male on day 5 of admission presenting with Cancer of soft palate (Multi). No acute events overnight. Flap checks stable.    Subjective   No acute events overnight. Pain and nausea controlled. No complaints at this time.    Objective   Physical Exam  Vitals reviewed in EMR  Gen: NAD, AOx3, resting comfortably in bed  Eyes: EOMI, sclera clear, PERRL  Ears: Normal external ears bilaterally  Nose: No rhinorrhea; anterior nares clear  Oral Cavity: All intraoral incisions c/d/i without evidence of dehiscence  -Flap soft and pink with good capillary refill  Head: normocephalic, atraumatic  Face/Neck:All incisions c/d/i, neck soft and flat without evidence of hematoma or fluid collection  -Internal doppler with strong arterial signal  -Trach in midline position without bleeding or thick tracheal secretions  Resp: 6.0 cuffed Shiley, cuff up likely down on 2/2, no labored breathing on   Cards: RRR on Doppler  Gastro: Soft, non-distended,  PEG tube in place  : Voiding clear yellow urine  MSK: left arm warm with intact movement and sensation  -left thigh skin graft donor site with tegaderm in place  Psych: Appropriate mood and affect  Drains: All drains in place and holding suction with serosanguinous drainage (stripped)    Last Recorded Vitals  Blood pressure 108/69, pulse 95, temperature 36.9 °C (98.4 °F), temperature source Temporal, resp. rate 18, height 1.702 m (5' 7\"), weight 58.4 kg (128 lb 12 oz), SpO2 98%.  Intake/Output last 3 Shifts:  I/O last 3 completed shifts:  In: 2034.2 (34.8 mL/kg) [I.V.:55.4 (0.9 mL/kg); Blood:343.8; NG/GT:1380; IV Piggyback:255]  Out: 2755.5 (47.2 mL/kg) [Urine:1600 (0.8 mL/kg/hr); Emesis/NG output:180; Drains:975.5]  Weight: 58.4 kg     Relevant Results  Results from last 7 days   Lab Units 02/02/25  0613 02/01/25  1816 02/01/25  0541   WBC AUTO x10*3/uL 11.3 9.4 12.3*   HEMOGLOBIN g/dL 8.4* 6.0* 7.2* "   PLATELETS AUTO x10*3/uL 344 306 352      Results from last 7 days   Lab Units 02/02/25  0613 02/01/25  0541 01/31/25  0605   SODIUM mmol/L 140 137 138   POTASSIUM mmol/L 3.9 4.2 3.6   CHLORIDE mmol/L 104 104 106   CO2 mmol/L 27 27 26   BUN mg/dL 5* 6 8   CREATININE mg/dL 0.54 0.59 0.53   GLUCOSE mg/dL 104* 90 99   MAGNESIUM mg/dL 2.02 1.84 1.94   PHOSPHORUS mg/dL 2.6 2.3* 2.6             Assessment/Plan   Assessment & Plan  Cancer of soft palate (Multi)    Assessment:  Sim Guaman is a 44 year old male with a diagnosis of Oropharyngeal SCC with known neck metastasis statu post Direct Laryngoscopy, tracheotomy, PEG (Akil), pharyngectomy, right parasymphyseal mandibulotomy with recon bar, bilateral Selective Neck Dissection levels I-IV, excision of R retropharyngeal Lymph Node, reconconstruction with Left Radial Forearm Free Flap, and Split Thickness Skin Graft on 1/29/2025  with Dr. Mclaughlin and Dr. Ferguson.      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  Cervical Lymph Node Mets  Nicotine Dependence  Acute Blood Loss Anemia  Dysphagia  Severe Anxiety  Agitation  Tachycardia, self resolves when patient is clam  Hypokalemia  Hypomagnesia  Hypophosphatemia  Acute  on Chronic Pain         Plan:  -Flap Checks: q12hrs ENT team checks and nursing per flap protocol  -Drains: Monitor output  -Analgesia:  Dilaudid breakthrough pain, Scheduled Acetaminophen, Gabapentin 300mg Hs, oxy 15/25 every 3 hours  -Neuro: Restarted home clonazepam, Consulted Supportive Oncology   -FEN: mIVFs, NPO, TF at goal rate, transition to bolus TF today, monitor and replete electrolytes as needed  -Pulm: s/p trach, humidified air at all times, standard trach care/suctioning/teaching; wean oxygen to room air, Cuff down today  -ID: Unasyn 3g Q6hrs x 1day, stop date 1/29->Complete  -Cardiac: Vitals per ENT free flap protocol then transition to Q4hr vitals; Aspirin 325mg daily  -Heme: Trending H/H-> down to 7.2  -Endo: No current  interventions  -GI: Bowel regimen, PPI,  and PRN anti-emetic  -: Abrams catheter discontinued, afternoon void check->voiding  -Steroids/Special: Incision and oral care per ENT free flap protocol  -Embolic PPx: SQLovenox, SCDs while in bed  -Dispo: [facility]. PT/OT ordered; Discharge planning for POD 6 home with home care vs skilled nursing facility              -Dispo planning difficult secondary to patient's insurance & location; it is true for both Home care & SNF        J Carlos Jang DO, PGY4  Otolaryngology - Head & Neck Surgery    ENT Consult pager: [phone]  ENT Peds pager: [phone]  ENT Head & Neck Surgery Phone: [phone]  ENT Subspecialty team (otology, rhinology, laryngology, plastics, sleep):   M-F 6am-5pm- EpicChat or page the resident who wrote the daily note   Nights & weekends- [phone]  ENT Outpatient scheduling number: [phone]  Please Page [phone] or call [phone] if urgent

## 2025-02-02 NOTE — PROGRESS NOTES
Reached out and left voice message for patient's wife regarding choice of facilities.  No answer received from wife yet but will plan to follow up again later this afternoon to obtain choice.

## 2025-02-02 NOTE — SIGNIFICANT EVENT
ENT Flap Check    Subjective:  PM Hgb 6.0, vitals stable, asymptomatic    Objective:      1/31/2025     5:00 PM 1/31/2025     8:53 PM 1/31/2025    11:19 PM 2/1/2025     9:28 AM 2/1/2025    11:48 AM 2/1/2025     3:52 PM 2/1/2025     8:54 PM   Vitals   Systolic 96 121 117 113 117 116 138   Diastolic 54 78 75 71 73 70 82   BP Location Right arm Right arm Right arm Right arm  Right arm Right arm   Heart Rate 82 82 85 82 100 76 91   Temp 36.3 °C (97.4 °F) 36.1 °C (97 °F) 36.2 °C (97.2 °F) 37 °C (98.6 °F) 36.8 °C (98.2 °F) 36.3 °C (97.3 °F) 36.9 °C (98.4 °F)   Resp 16 16 16 16 16 16 16     Gen: NAD, resting in chair  Face/Neck: All incisions c/d/i, neck soft and flat without evidence of hematoma or fluid collection  -Internal doppler with strong arterial signal  -Trach in midline position without bleeding or thick tracheal secretions  Oral Cavity: All intraoral incisions c/d/i without evidence of dehiscence  -Flap soft and pink with good capillary refill  Extremities: left arm warm with intact movement and sensation  -left thigh skin graft donor site with tegaderm in place  Abdomen: PEG in place  Drains: All drains in place and holding suction with serosanguinous drainage (stripped)    Assessment:  S/p DL, trach, PEG (Akil), pharyngectomy, right parasymphyseal mandibulotomy with recon bar, bilat SND 1-4, excision of R retropharyngeal LN, recon with L RFFF, STSG 1/29 with Dr. Mclaughlin and Dr. Ferguson.     -Continue Q6hr ENT flap checks  - +1u PRBC    Yun Hernandez MD

## 2025-02-02 NOTE — SIGNIFICANT EVENT
ENT Flap Check    Subjective:  Asking for pain meds. Appears less anxious    Objective:      1/31/2025    12:30 PM 1/31/2025     5:00 PM 1/31/2025     8:53 PM 1/31/2025    11:19 PM 2/1/2025     9:28 AM 2/1/2025    11:48 AM 2/1/2025     3:52 PM   Vitals   Systolic 96 96 121 117 113 117 116   Diastolic 60 54 78 75 71 73 70   BP Location Right arm Right arm Right arm Right arm Right arm  Right arm   Heart Rate 80 82 82 85 82 100 76   Temp 36.5 °C (97.7 °F) 36.3 °C (97.4 °F) 36.1 °C (97 °F) 36.2 °C (97.2 °F) 37 °C (98.6 °F) 36.8 °C (98.2 °F) 36.3 °C (97.3 °F)   Resp 16 16 16 16 16 16 16     Gen: NAD, resting in chair  Face/Neck: All incisions c/d/i, neck soft and flat without evidence of hematoma or fluid collection  -Internal doppler with strong arterial signal  -Trach in midline position without bleeding or thick tracheal secretions  Oral Cavity: All intraoral incisions c/d/i without evidence of dehiscence  -Flap soft and pink with good capillary refill  Extremities: left arm warm with intact movement and sensation  -left thigh skin graft donor site with tegaderm in place  Abdomen: PEG in place  Drains: All drains in place and holding suction with serosanguinous drainage (stripped)    Assessment:  S/p DL, trach, PEG (Akil), pharyngectomy, right parasymphyseal mandibulotomy with recon bar, bilat SND 1-4, excision of R retropharyngeal LN, recon with L RFFF, STSG 1/29 with Dr. Mclaughlin and Dr. Ferguson.     -Continue Q6hr ENT flap checks    Yun Hernandez MD

## 2025-02-02 NOTE — CARE PLAN
The patient's goals for the shift include  patient will have good pain control this shift    The clinical goals for the shift include patient will remain stable this shift

## 2025-02-02 NOTE — CARE PLAN
Problem: Pain - Adult  Goal: Verbalizes/displays adequate comfort level or baseline comfort level  Outcome: Progressing     Problem: Safety - Adult  Goal: Free from fall injury  Outcome: Progressing     Problem: Discharge Planning  Goal: Discharge to home or other facility with appropriate resources  Outcome: Progressing     Problem: Chronic Conditions and Co-morbidities  Goal: Patient's chronic conditions and co-morbidity symptoms are monitored and maintained or improved  Outcome: Progressing     Problem: Nutrition  Goal: Nutrient intake appropriate for maintaining nutritional needs  Outcome: Progressing     Problem: Pain  Goal: Takes deep breaths with improved pain control throughout the shift  Outcome: Progressing  Goal: Turns in bed with improved pain control throughout the shift  Outcome: Progressing  Goal: Walks with improved pain control throughout the shift  Outcome: Progressing  Goal: Performs ADL's with improved pain control throughout shift  Outcome: Progressing  Goal: Participates in PT with improved pain control throughout the shift  Outcome: Progressing  Goal: Free from opioid side effects throughout the shift  Outcome: Progressing  Goal: Free from acute confusion related to pain meds throughout the shift  Outcome: Progressing     Problem: Fall/Injury  Goal: Not fall by end of shift  Outcome: Progressing  Goal: Be free from injury by end of the shift  Outcome: Progressing     Problem: Respiratory  Goal: Clear secretions with interventions this shift  Outcome: Progressing  Goal: Minimize anxiety/maximize coping throughout shift  Outcome: Progressing  Goal: No signs of respiratory distress (eg. Use of accessory muscles. Peds grunting)  Outcome: Progressing  Goal: Patent airway maintained this shift  Outcome: Progressing  Goal: Wean oxygen to maintain O2 saturation per order/standard this shift  Outcome: Progressing  Goal: Increase self care and/or family involvement in next 24 hours  Outcome:  Progressing       The clinical goals for the shift include Patient's flap will remain stable (well perfused and positive doppler) throughout shift

## 2025-02-03 LAB
ALBUMIN SERPL BCP-MCNC: 2.9 G/DL (ref 3.4–5)
ANION GAP SERPL CALC-SCNC: 8 MMOL/L (ref 10–20)
BUN SERPL-MCNC: 4 MG/DL (ref 6–23)
CALCIUM SERPL-MCNC: 8.6 MG/DL (ref 8.6–10.6)
CHLORIDE SERPL-SCNC: 104 MMOL/L (ref 98–107)
CO2 SERPL-SCNC: 29 MMOL/L (ref 21–32)
CREAT SERPL-MCNC: 0.56 MG/DL (ref 0.5–1.3)
EGFRCR SERPLBLD CKD-EPI 2021: >90 ML/MIN/1.73M*2
ERYTHROCYTE [DISTWIDTH] IN BLOOD BY AUTOMATED COUNT: 22.5 % (ref 11.5–14.5)
GLUCOSE SERPL-MCNC: 101 MG/DL (ref 74–99)
HCT VFR BLD AUTO: 26.5 % (ref 41–52)
HGB BLD-MCNC: 8.7 G/DL (ref 13.5–17.5)
MAGNESIUM SERPL-MCNC: 1.85 MG/DL (ref 1.6–2.4)
MCH RBC QN AUTO: 28.2 PG (ref 26–34)
MCHC RBC AUTO-ENTMCNC: 32.8 G/DL (ref 32–36)
MCV RBC AUTO: 86 FL (ref 80–100)
NRBC BLD-RTO: 0 /100 WBCS (ref 0–0)
PHOSPHATE SERPL-MCNC: 3.3 MG/DL (ref 2.5–4.9)
PLATELET # BLD AUTO: 390 X10*3/UL (ref 150–450)
POTASSIUM SERPL-SCNC: 3.9 MMOL/L (ref 3.5–5.3)
RBC # BLD AUTO: 3.08 X10*6/UL (ref 4.5–5.9)
SODIUM SERPL-SCNC: 137 MMOL/L (ref 136–145)
WBC # BLD AUTO: 11.8 X10*3/UL (ref 4.4–11.3)

## 2025-02-03 PROCEDURE — 2500000001 HC RX 250 WO HCPCS SELF ADMINISTERED DRUGS (ALT 637 FOR MEDICARE OP): Mod: SE | Performed by: NURSE PRACTITIONER

## 2025-02-03 PROCEDURE — 85027 COMPLETE CBC AUTOMATED: CPT | Performed by: STUDENT IN AN ORGANIZED HEALTH CARE EDUCATION/TRAINING PROGRAM

## 2025-02-03 PROCEDURE — 1170000001 HC PRIVATE ONCOLOGY ROOM DAILY

## 2025-02-03 PROCEDURE — 2500000004 HC RX 250 GENERAL PHARMACY W/ HCPCS (ALT 636 FOR OP/ED): Mod: JZ,SE | Performed by: NURSE PRACTITIONER

## 2025-02-03 PROCEDURE — 2500000004 HC RX 250 GENERAL PHARMACY W/ HCPCS (ALT 636 FOR OP/ED): Mod: SE | Performed by: STUDENT IN AN ORGANIZED HEALTH CARE EDUCATION/TRAINING PROGRAM

## 2025-02-03 PROCEDURE — 99232 SBSQ HOSP IP/OBS MODERATE 35: CPT | Performed by: NURSE PRACTITIONER

## 2025-02-03 PROCEDURE — 80069 RENAL FUNCTION PANEL: CPT | Performed by: STUDENT IN AN ORGANIZED HEALTH CARE EDUCATION/TRAINING PROGRAM

## 2025-02-03 PROCEDURE — 2500000005 HC RX 250 GENERAL PHARMACY W/O HCPCS: Mod: SE | Performed by: STUDENT IN AN ORGANIZED HEALTH CARE EDUCATION/TRAINING PROGRAM

## 2025-02-03 PROCEDURE — 2500000001 HC RX 250 WO HCPCS SELF ADMINISTERED DRUGS (ALT 637 FOR MEDICARE OP): Mod: SE | Performed by: STUDENT IN AN ORGANIZED HEALTH CARE EDUCATION/TRAINING PROGRAM

## 2025-02-03 PROCEDURE — 36415 COLL VENOUS BLD VENIPUNCTURE: CPT | Performed by: STUDENT IN AN ORGANIZED HEALTH CARE EDUCATION/TRAINING PROGRAM

## 2025-02-03 PROCEDURE — 83735 ASSAY OF MAGNESIUM: CPT | Performed by: STUDENT IN AN ORGANIZED HEALTH CARE EDUCATION/TRAINING PROGRAM

## 2025-02-03 RX ADMIN — OXYCODONE HYDROCHLORIDE 25 MG: 5 SOLUTION ORAL at 15:35

## 2025-02-03 RX ADMIN — ACETAMINOPHEN 1000 MG: 160 SOLUTION ORAL at 15:35

## 2025-02-03 RX ADMIN — HYDROMORPHONE HYDROCHLORIDE 1 MG: 1 INJECTION, SOLUTION INTRAMUSCULAR; INTRAVENOUS; SUBCUTANEOUS at 06:29

## 2025-02-03 RX ADMIN — ACETAMINOPHEN 1000 MG: 160 SOLUTION ORAL at 06:29

## 2025-02-03 RX ADMIN — PETROLATUM 1 APPLICATION: 1 OINTMENT TOPICAL at 15:47

## 2025-02-03 RX ADMIN — CLONAZEPAM 1 MG: 0.5 TABLET ORAL at 00:34

## 2025-02-03 RX ADMIN — OXYCODONE HYDROCHLORIDE 25 MG: 5 SOLUTION ORAL at 09:48

## 2025-02-03 RX ADMIN — HYDROMORPHONE HYDROCHLORIDE 1 MG: 1 INJECTION, SOLUTION INTRAMUSCULAR; INTRAVENOUS; SUBCUTANEOUS at 18:02

## 2025-02-03 RX ADMIN — HYDROGEN PEROXIDE 1 APPLICATION: 30 SOLUTION TOPICAL at 15:38

## 2025-02-03 RX ADMIN — HYDROMORPHONE HYDROCHLORIDE 1 MG: 1 INJECTION, SOLUTION INTRAMUSCULAR; INTRAVENOUS; SUBCUTANEOUS at 00:34

## 2025-02-03 RX ADMIN — Medication 1 L/MIN: at 09:53

## 2025-02-03 RX ADMIN — OXYCODONE HYDROCHLORIDE 25 MG: 5 SOLUTION ORAL at 03:26

## 2025-02-03 RX ADMIN — HYDROGEN PEROXIDE 1 APPLICATION: 30 SOLUTION TOPICAL at 09:50

## 2025-02-03 RX ADMIN — GABAPENTIN 300 MG: 250 SOLUTION ORAL at 20:18

## 2025-02-03 RX ADMIN — HYDROGEN PEROXIDE 1 APPLICATION: 30 SOLUTION TOPICAL at 20:27

## 2025-02-03 RX ADMIN — ASPIRIN 325 MG ORAL TABLET 325 MG: 325 PILL ORAL at 09:48

## 2025-02-03 RX ADMIN — CLONAZEPAM 1 MG: 0.5 TABLET ORAL at 04:35

## 2025-02-03 RX ADMIN — HYDROMORPHONE HYDROCHLORIDE 1 MG: 1 INJECTION, SOLUTION INTRAMUSCULAR; INTRAVENOUS; SUBCUTANEOUS at 11:10

## 2025-02-03 RX ADMIN — ENOXAPARIN SODIUM 40 MG: 100 INJECTION SUBCUTANEOUS at 09:48

## 2025-02-03 RX ADMIN — CLONAZEPAM 1 MG: 0.5 TABLET ORAL at 15:36

## 2025-02-03 RX ADMIN — OXYCODONE HYDROCHLORIDE 25 MG: 5 SOLUTION ORAL at 20:20

## 2025-02-03 RX ADMIN — HYDROMORPHONE HYDROCHLORIDE 1 MG: 1 INJECTION, SOLUTION INTRAMUSCULAR; INTRAVENOUS; SUBCUTANEOUS at 22:48

## 2025-02-03 RX ADMIN — SALINE NASAL SPRAY 2 SPRAY: 1.5 SOLUTION NASAL at 15:57

## 2025-02-03 RX ADMIN — ESOMEPRAZOLE MAGNESIUM 40 MG: 40 FOR SUSPENSION ORAL at 06:29

## 2025-02-03 RX ADMIN — PETROLATUM 1 APPLICATION: 1 OINTMENT TOPICAL at 10:50

## 2025-02-03 RX ADMIN — CLONAZEPAM 1 MG: 0.5 TABLET ORAL at 21:07

## 2025-02-03 RX ADMIN — ACETAMINOPHEN 1000 MG: 160 SOLUTION ORAL at 22:37

## 2025-02-03 RX ADMIN — SALINE NASAL SPRAY 2 SPRAY: 1.5 SOLUTION NASAL at 21:07

## 2025-02-03 RX ADMIN — PETROLATUM 1 APPLICATION: 1 OINTMENT TOPICAL at 20:28

## 2025-02-03 RX ADMIN — OXYCODONE HYDROCHLORIDE 25 MG: 5 SOLUTION ORAL at 23:55

## 2025-02-03 ASSESSMENT — PAIN DESCRIPTION - LOCATION
LOCATION: FACE

## 2025-02-03 ASSESSMENT — PAIN SCALES - GENERAL
PAINLEVEL_OUTOF10: 10 - WORST POSSIBLE PAIN
PAINLEVEL_OUTOF10: 10 - WORST POSSIBLE PAIN
PAINLEVEL_OUTOF10: 8
PAINLEVEL_OUTOF10: 8
PAINLEVEL_OUTOF10: 10 - WORST POSSIBLE PAIN
PAINLEVEL_OUTOF10: 9
PAINLEVEL_OUTOF10: 8
PAINLEVEL_OUTOF10: 10 - WORST POSSIBLE PAIN
PAINLEVEL_OUTOF10: 9
PAINLEVEL_OUTOF10: 8

## 2025-02-03 ASSESSMENT — PAIN - FUNCTIONAL ASSESSMENT
PAIN_FUNCTIONAL_ASSESSMENT: 0-10

## 2025-02-03 NOTE — PROGRESS NOTES
"  Ear Nose & Throat Progress Note           Sim Guaman is a 44 y.o. male on day 6 of admission presenting with Cancer of soft palate (Multi). No acute events overnight. Flap checks stable.    Subjective   Patient ding well. His anxiety has decreased compared to the first few days. His pain is being controlled on current regimen; Supportive onc will follow. He has also been able to speak over his trachsince being changed & down sized.     Objective   Physical Exam  Vitals reviewed in EMR  Gen: NAD, AOx3, resting comfortably in bed  Eyes: EOMI, sclera clear, PERRL  Ears: Normal external ears bilaterally  Nose: No rhinorrhea; anterior nares clear  Oral Cavity: All intraoral incisions c/d/i without evidence of dehiscence  -Flap soft and pink with good capillary refill  Head: normocephalic, atraumatic  Face/Neck:All incisions c/d/i, neck soft and flat without evidence of hematoma or fluid collection  -Internal doppler with strong arterial signal  -Trach in midline position without bleeding or thick tracheal secretions  Resp: 4.0 cuffless Shiley, non-labored breathing on room air  Cards: RRR on Doppler  Gastro: Soft, non-distended,  PEG tube in place  : Voiding clear yellow urine  MSK: left arm warm with intact movement and sensation  -left thigh skin graft donor site with tegaderm in place  Psych: Appropriate mood and affect  Drains: All drains in place and holding suction with serosanguinous drainage (stripped)    Last Recorded Vitals  Blood pressure 126/80, pulse 102, temperature 36.6 °C (97.9 °F), temperature source Temporal, resp. rate 18, height 1.702 m (5' 7\"), weight 58.4 kg (128 lb 12 oz), SpO2 98%.  Intake/Output last 3 Shifts:  I/O last 3 completed shifts:  In: 2798.8 (47.9 mL/kg) [Blood:343.8; NG/GT:2200; IV Piggyback:255]  Out: 2960 (50.7 mL/kg) [Urine:2550 (1.2 mL/kg/hr); Drains:410]  Weight: 58.4 kg     Relevant Results  Results for orders placed or performed during the hospital encounter of " 01/28/25 (from the past 24 hours)   Renal Function Panel   Result Value Ref Range    Glucose 101 (H) 74 - 99 mg/dL    Sodium 137 136 - 145 mmol/L    Potassium 3.9 3.5 - 5.3 mmol/L    Chloride 104 98 - 107 mmol/L    Bicarbonate 29 21 - 32 mmol/L    Anion Gap 8 (L) 10 - 20 mmol/L    Urea Nitrogen 4 (L) 6 - 23 mg/dL    Creatinine 0.56 0.50 - 1.30 mg/dL    eGFR >90 >60 mL/min/1.73m*2    Calcium 8.6 8.6 - 10.6 mg/dL    Phosphorus 3.3 2.5 - 4.9 mg/dL    Albumin 2.9 (L) 3.4 - 5.0 g/dL   CBC   Result Value Ref Range    WBC 11.8 (H) 4.4 - 11.3 x10*3/uL    nRBC 0.0 0.0 - 0.0 /100 WBCs    RBC 3.08 (L) 4.50 - 5.90 x10*6/uL    Hemoglobin 8.7 (L) 13.5 - 17.5 g/dL    Hematocrit 26.5 (L) 41.0 - 52.0 %    MCV 86 80 - 100 fL    MCH 28.2 26.0 - 34.0 pg    MCHC 32.8 32.0 - 36.0 g/dL    RDW 22.5 (H) 11.5 - 14.5 %    Platelets 390 150 - 450 x10*3/uL   Magnesium   Result Value Ref Range    Magnesium 1.85 1.60 - 2.40 mg/dL        No results found.     Scheduled medications  acetaminophen, 1,000 mg, g-tube, q8h ELANA  aspirin, 325 mg, g-tube, Daily  enoxaparin, 40 mg, subcutaneous, q24h  esomeprazole, 40 mg, g-tube, Daily before breakfast  gabapentin, 300 mg, g-tube, Nightly  hydrogen peroxide, 1 Application, Topical, TID  oxygen, , inhalation, Continuous - Inhalation  polyethylene glycol, 17 g, g-tube, Daily  senna, 10 mL, g-tube, BID  white petrolatum, 1 Application, Topical, TID      Continuous medications     PRN medications  PRN medications: clonazePAM, HYDROmorphone, naloxone, ondansetron, oxyCODONE, oxyCODONE                         Assessment/Plan   Assessment & Plan  Cancer of soft palate (Multi)    Assessment:  Sim Guaman is a 44 year old male with a diagnosis of Oropharyngeal SCC with known neck metastasis statu post Direct Laryngoscopy, tracheotomy, PEG (Akil), pharyngectomy, right parasymphyseal mandibulotomy with recon bar, bilateral Selective Neck Dissection levels I-IV, excision of R retropharyngeal Lymph Node,  reconconstruction with Left Radial Forearm Free Flap, and Split Thickness Skin Graft on 1/29/2025  with Dr. Mclaughlin and Dr. Ferguson.      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  Cervical Lymph Node Mets  Nicotine Dependence  Acute Blood Loss Anemia  Dysphagia  Severe Anxiety  Agitation  Tachycardia, self resolves when patient is clam  Hypokalemia  Hypomagnesia  Hypophosphatemia  Acute  on Chronic Pain         Plan:  -Flap Checks: q12hrs ENT team checks and nursing per flap protocol  -Drains: Monitor output  -Analgesia:  Dilaudid breakthrough pain, Scheduled Acetaminophen, Gabapentin 300mg Hs, oxy 15/25 every 3 hours  -Neuro: Restarted home clonazepam, Consulted Supportive Oncology   -FEN: mIVFs, NPO, TF at goal rate, transition to home bolus TF today, monitor and replete electrolytes as needed  -Pulm: s/p trach, humidified air at all times, standard trach care/suctioning/teaching; wean oxygen to room air, Capping trial initiated   -ID: Unasyn 3g Q6hrs x 1day, stop date 1/29->Complete  -Cardiac: Vitals per ENT free flap protocol then transition to Q4hr vitals; Aspirin 325mg daily  -Heme: Trending H/H-> down to 7.2  -Endo: No current interventions  -GI: Bowel regimen, PPI,  and PRN anti-emetic  -: Abrams catheter discontinued, afternoon void check->voiding  -Steroids/Special: Incision and oral care per ENT free flap protocol  -Embolic PPx: SQLovenox, SCDs while in bed  -Dispo: [facility]. PT/OT ordered; Discharge planning for POD 6 home with home care vs skilled nursing facility              -Dispo planning difficult secondary to patient's insurance & location; it is true for both Home care & SNF      All plans discussed with attending after morning rounds.    I spent 35 minutes in the professional and overall care of this patient.    Ligia Gordon APRN, CNP  Certified Family Nurse Practitioner  Nurse Practitioner III  Department of Otolaryngology: Head & Neck Surgery  Personal Pager [phone]  ENT Team  Head and  Neck Phone: [phone]

## 2025-02-04 LAB
ALBUMIN SERPL BCP-MCNC: 2.9 G/DL (ref 3.4–5)
ANION GAP SERPL CALC-SCNC: 12 MMOL/L (ref 10–20)
BLOOD EXPIRATION DATE: NORMAL
BLOOD EXPIRATION DATE: NORMAL
BUN SERPL-MCNC: 7 MG/DL (ref 6–23)
CALCIUM SERPL-MCNC: 8.8 MG/DL (ref 8.6–10.6)
CHLORIDE SERPL-SCNC: 101 MMOL/L (ref 98–107)
CO2 SERPL-SCNC: 27 MMOL/L (ref 21–32)
CREAT SERPL-MCNC: 0.64 MG/DL (ref 0.5–1.3)
DISPENSE STATUS: NORMAL
DISPENSE STATUS: NORMAL
EGFRCR SERPLBLD CKD-EPI 2021: >90 ML/MIN/1.73M*2
ERYTHROCYTE [DISTWIDTH] IN BLOOD BY AUTOMATED COUNT: 22.5 % (ref 11.5–14.5)
GLUCOSE SERPL-MCNC: 103 MG/DL (ref 74–99)
HCT VFR BLD AUTO: 26.8 % (ref 41–52)
HGB BLD-MCNC: 8.9 G/DL (ref 13.5–17.5)
MAGNESIUM SERPL-MCNC: 1.84 MG/DL (ref 1.6–2.4)
MCH RBC QN AUTO: 28.8 PG (ref 26–34)
MCHC RBC AUTO-ENTMCNC: 33.2 G/DL (ref 32–36)
MCV RBC AUTO: 87 FL (ref 80–100)
NRBC BLD-RTO: 0 /100 WBCS (ref 0–0)
PHOSPHATE SERPL-MCNC: 3.7 MG/DL (ref 2.5–4.9)
PLATELET # BLD AUTO: 444 X10*3/UL (ref 150–450)
POTASSIUM SERPL-SCNC: 3.7 MMOL/L (ref 3.5–5.3)
PRODUCT BLOOD TYPE: 9500
PRODUCT BLOOD TYPE: 9500
PRODUCT CODE: NORMAL
PRODUCT CODE: NORMAL
RBC # BLD AUTO: 3.09 X10*6/UL (ref 4.5–5.9)
SODIUM SERPL-SCNC: 136 MMOL/L (ref 136–145)
UNIT ABO: NORMAL
UNIT ABO: NORMAL
UNIT NUMBER: NORMAL
UNIT NUMBER: NORMAL
UNIT RH: NORMAL
UNIT RH: NORMAL
UNIT VOLUME: 310
UNIT VOLUME: 350
WBC # BLD AUTO: 13.2 X10*3/UL (ref 4.4–11.3)
XM INTEP: NORMAL
XM INTEP: NORMAL

## 2025-02-04 PROCEDURE — 2500000001 HC RX 250 WO HCPCS SELF ADMINISTERED DRUGS (ALT 637 FOR MEDICARE OP): Mod: SE | Performed by: NURSE PRACTITIONER

## 2025-02-04 PROCEDURE — 97116 GAIT TRAINING THERAPY: CPT | Mod: GP

## 2025-02-04 PROCEDURE — 2500000004 HC RX 250 GENERAL PHARMACY W/ HCPCS (ALT 636 FOR OP/ED): Mod: SE

## 2025-02-04 PROCEDURE — 1170000001 HC PRIVATE ONCOLOGY ROOM DAILY

## 2025-02-04 PROCEDURE — 80069 RENAL FUNCTION PANEL: CPT | Performed by: STUDENT IN AN ORGANIZED HEALTH CARE EDUCATION/TRAINING PROGRAM

## 2025-02-04 PROCEDURE — 2500000001 HC RX 250 WO HCPCS SELF ADMINISTERED DRUGS (ALT 637 FOR MEDICARE OP): Mod: SE

## 2025-02-04 PROCEDURE — 2500000001 HC RX 250 WO HCPCS SELF ADMINISTERED DRUGS (ALT 637 FOR MEDICARE OP): Mod: SE | Performed by: STUDENT IN AN ORGANIZED HEALTH CARE EDUCATION/TRAINING PROGRAM

## 2025-02-04 PROCEDURE — 97530 THERAPEUTIC ACTIVITIES: CPT | Mod: GP

## 2025-02-04 PROCEDURE — 99232 SBSQ HOSP IP/OBS MODERATE 35: CPT | Performed by: NURSE PRACTITIONER

## 2025-02-04 PROCEDURE — 31720 CLEARANCE OF AIRWAYS: CPT

## 2025-02-04 PROCEDURE — 85027 COMPLETE CBC AUTOMATED: CPT | Performed by: STUDENT IN AN ORGANIZED HEALTH CARE EDUCATION/TRAINING PROGRAM

## 2025-02-04 PROCEDURE — 36415 COLL VENOUS BLD VENIPUNCTURE: CPT | Performed by: STUDENT IN AN ORGANIZED HEALTH CARE EDUCATION/TRAINING PROGRAM

## 2025-02-04 PROCEDURE — 83735 ASSAY OF MAGNESIUM: CPT | Performed by: STUDENT IN AN ORGANIZED HEALTH CARE EDUCATION/TRAINING PROGRAM

## 2025-02-04 PROCEDURE — 2500000004 HC RX 250 GENERAL PHARMACY W/ HCPCS (ALT 636 FOR OP/ED): Mod: SE | Performed by: STUDENT IN AN ORGANIZED HEALTH CARE EDUCATION/TRAINING PROGRAM

## 2025-02-04 PROCEDURE — 2500000005 HC RX 250 GENERAL PHARMACY W/O HCPCS: Mod: SE | Performed by: STUDENT IN AN ORGANIZED HEALTH CARE EDUCATION/TRAINING PROGRAM

## 2025-02-04 PROCEDURE — 2500000004 HC RX 250 GENERAL PHARMACY W/ HCPCS (ALT 636 FOR OP/ED): Mod: JZ,SE | Performed by: NURSE PRACTITIONER

## 2025-02-04 RX ORDER — MULTIVIT-MIN/IRON FUM/FOLIC AC 7.5 MG-4
1 TABLET ORAL DAILY
Start: 2025-02-04 | End: 2025-02-06

## 2025-02-04 RX ORDER — PETROLATUM 420 MG/G
1 OINTMENT TOPICAL 2 TIMES DAILY
Qty: 113 G | Refills: 0 | Status: SHIPPED | OUTPATIENT
Start: 2025-02-04 | End: 2025-02-06

## 2025-02-04 RX ORDER — POLYETHYLENE GLYCOL 3350 17 G/17G
17 POWDER, FOR SOLUTION ORAL DAILY
Qty: 14 PACKET | Refills: 0 | Status: SHIPPED | OUTPATIENT
Start: 2025-02-05 | End: 2025-02-06

## 2025-02-04 RX ORDER — MAGNESIUM SULFATE HEPTAHYDRATE 40 MG/ML
2 INJECTION, SOLUTION INTRAVENOUS ONCE
Status: COMPLETED | OUTPATIENT
Start: 2025-02-04 | End: 2025-02-04

## 2025-02-04 RX ORDER — SENNOSIDES 8.8 MG/5ML
10 LIQUID ORAL 2 TIMES DAILY
Qty: 240 ML | Refills: 0 | Status: SHIPPED | OUTPATIENT
Start: 2025-02-04 | End: 2025-02-06

## 2025-02-04 RX ORDER — CHLORHEXIDINE GLUCONATE ORAL RINSE 1.2 MG/ML
15 SOLUTION DENTAL
Qty: 450 ML | Refills: 0 | Status: SHIPPED | OUTPATIENT
Start: 2025-02-04 | End: 2025-02-11 | Stop reason: SDUPTHER

## 2025-02-04 RX ORDER — BISMUTH TRIBROMOPH/PETROLATUM 5"X9"
1 BANDAGE TOPICAL DAILY
Qty: 30 EACH | Refills: 0 | Status: SHIPPED | OUTPATIENT
Start: 2025-02-04

## 2025-02-04 RX ORDER — OXYCODONE HYDROCHLORIDE 15 MG/1
15 TABLET ORAL EVERY 4 HOURS PRN
Start: 2025-02-11 | End: 2025-02-06

## 2025-02-04 RX ORDER — POTASSIUM CHLORIDE 1.5 G/1.58G
20 POWDER, FOR SOLUTION ORAL ONCE
Status: COMPLETED | OUTPATIENT
Start: 2025-02-04 | End: 2025-02-04

## 2025-02-04 RX ORDER — NALOXONE HYDROCHLORIDE 4 MG/.1ML
4 SPRAY NASAL AS NEEDED
Qty: 2 EACH | Refills: 11 | Status: SHIPPED | OUTPATIENT
Start: 2025-02-04

## 2025-02-04 RX ORDER — BISMUTH TRIBROMOPH/PETROLATUM 5"X9"
1 BANDAGE TOPICAL DAILY
Qty: 30 EACH | Refills: 0 | Status: SHIPPED | OUTPATIENT
Start: 2025-02-04 | End: 2025-02-04

## 2025-02-04 RX ORDER — ACETAMINOPHEN 160 MG/5ML
1000 SOLUTION ORAL EVERY 8 HOURS PRN
Qty: 852.6 ML | Refills: 0 | Status: SHIPPED | OUTPATIENT
Start: 2025-02-04 | End: 2025-02-06

## 2025-02-04 RX ORDER — OXYCODONE HCL 5 MG/5 ML
15 SOLUTION, ORAL ORAL EVERY 4 HOURS PRN
Qty: 450 ML | Refills: 0 | Status: SHIPPED | OUTPATIENT
Start: 2025-02-04 | End: 2025-02-06

## 2025-02-04 RX ORDER — ASPIRIN 325 MG
325 TABLET ORAL DAILY
Qty: 23 TABLET | Refills: 0 | Status: SHIPPED | OUTPATIENT
Start: 2025-02-05 | End: 2025-02-06

## 2025-02-04 RX ORDER — GABAPENTIN 250 MG/5ML
300 SOLUTION ORAL NIGHTLY
Qty: 90 ML | Refills: 0 | Status: SHIPPED | OUTPATIENT
Start: 2025-02-04 | End: 2025-02-06

## 2025-02-04 RX ADMIN — ACETAMINOPHEN 1000 MG: 160 SOLUTION ORAL at 21:37

## 2025-02-04 RX ADMIN — PETROLATUM 1 APPLICATION: 1 OINTMENT TOPICAL at 21:38

## 2025-02-04 RX ADMIN — OXYCODONE HYDROCHLORIDE 25 MG: 5 SOLUTION ORAL at 09:25

## 2025-02-04 RX ADMIN — HYDROMORPHONE HYDROCHLORIDE 1 MG: 1 INJECTION, SOLUTION INTRAMUSCULAR; INTRAVENOUS; SUBCUTANEOUS at 23:35

## 2025-02-04 RX ADMIN — OXYCODONE HYDROCHLORIDE 25 MG: 5 SOLUTION ORAL at 19:03

## 2025-02-04 RX ADMIN — HYDROGEN PEROXIDE 1 APPLICATION: 30 SOLUTION TOPICAL at 21:37

## 2025-02-04 RX ADMIN — SALINE NASAL SPRAY 2 SPRAY: 1.5 SOLUTION NASAL at 16:50

## 2025-02-04 RX ADMIN — Medication 21 PERCENT: at 08:39

## 2025-02-04 RX ADMIN — PETROLATUM 1 APPLICATION: 1 OINTMENT TOPICAL at 15:54

## 2025-02-04 RX ADMIN — MAGNESIUM SULFATE HEPTAHYDRATE 2 G: 40 INJECTION, SOLUTION INTRAVENOUS at 11:11

## 2025-02-04 RX ADMIN — SALINE NASAL SPRAY 2 SPRAY: 1.5 SOLUTION NASAL at 13:04

## 2025-02-04 RX ADMIN — OXYCODONE HYDROCHLORIDE 25 MG: 5 SOLUTION ORAL at 15:33

## 2025-02-04 RX ADMIN — CLONAZEPAM 1 MG: 0.5 TABLET ORAL at 11:11

## 2025-02-04 RX ADMIN — POTASSIUM CHLORIDE 20 MEQ: 1.5 POWDER, FOR SOLUTION ORAL at 11:11

## 2025-02-04 RX ADMIN — GABAPENTIN 300 MG: 250 SOLUTION ORAL at 21:37

## 2025-02-04 RX ADMIN — SALINE NASAL SPRAY 2 SPRAY: 1.5 SOLUTION NASAL at 21:37

## 2025-02-04 RX ADMIN — OXYCODONE HYDROCHLORIDE 25 MG: 5 SOLUTION ORAL at 12:35

## 2025-02-04 RX ADMIN — CLONAZEPAM 1 MG: 0.5 TABLET ORAL at 21:37

## 2025-02-04 RX ADMIN — Medication 21 PERCENT: at 21:43

## 2025-02-04 RX ADMIN — ESOMEPRAZOLE MAGNESIUM 40 MG: 40 FOR SUSPENSION ORAL at 08:07

## 2025-02-04 RX ADMIN — ASPIRIN 325 MG ORAL TABLET 325 MG: 325 PILL ORAL at 09:12

## 2025-02-04 RX ADMIN — HYDROMORPHONE HYDROCHLORIDE 1 MG: 1 INJECTION, SOLUTION INTRAMUSCULAR; INTRAVENOUS; SUBCUTANEOUS at 08:07

## 2025-02-04 RX ADMIN — ACETAMINOPHEN 1000 MG: 160 SOLUTION ORAL at 05:11

## 2025-02-04 RX ADMIN — SALINE NASAL SPRAY 2 SPRAY: 1.5 SOLUTION NASAL at 08:07

## 2025-02-04 RX ADMIN — ENOXAPARIN SODIUM 40 MG: 100 INJECTION SUBCUTANEOUS at 09:12

## 2025-02-04 RX ADMIN — HYDROMORPHONE HYDROCHLORIDE 1 MG: 1 INJECTION, SOLUTION INTRAMUSCULAR; INTRAVENOUS; SUBCUTANEOUS at 02:52

## 2025-02-04 RX ADMIN — HYDROGEN PEROXIDE 1 APPLICATION: 30 SOLUTION TOPICAL at 15:54

## 2025-02-04 RX ADMIN — CLONAZEPAM 1 MG: 0.5 TABLET ORAL at 05:11

## 2025-02-04 RX ADMIN — HYDROGEN PEROXIDE 1 APPLICATION: 30 SOLUTION TOPICAL at 09:14

## 2025-02-04 RX ADMIN — ACETAMINOPHEN 1000 MG: 160 SOLUTION ORAL at 12:36

## 2025-02-04 RX ADMIN — OXYCODONE HYDROCHLORIDE 25 MG: 5 SOLUTION ORAL at 22:05

## 2025-02-04 RX ADMIN — PETROLATUM 1 APPLICATION: 1 OINTMENT TOPICAL at 09:15

## 2025-02-04 RX ADMIN — OXYCODONE HYDROCHLORIDE 25 MG: 5 SOLUTION ORAL at 05:11

## 2025-02-04 ASSESSMENT — COGNITIVE AND FUNCTIONAL STATUS - GENERAL
HELP NEEDED FOR BATHING: A LITTLE
DRESSING REGULAR LOWER BODY CLOTHING: A LITTLE
MOBILITY SCORE: 24
DRESSING REGULAR UPPER BODY CLOTHING: A LITTLE
HELP NEEDED FOR BATHING: A LITTLE
TOILETING: A LITTLE
PERSONAL GROOMING: A LITTLE
CLIMB 3 TO 5 STEPS WITH RAILING: A LITTLE
MOBILITY SCORE: 23
EATING MEALS: A LITTLE
TOILETING: A LITTLE
EATING MEALS: A LITTLE
STANDING UP FROM CHAIR USING ARMS: A LITTLE
WALKING IN HOSPITAL ROOM: A LITTLE
PERSONAL GROOMING: A LITTLE
DRESSING REGULAR LOWER BODY CLOTHING: A LITTLE
DAILY ACTIVITIY SCORE: 18
CLIMB 3 TO 5 STEPS WITH RAILING: A LITTLE
DRESSING REGULAR UPPER BODY CLOTHING: A LITTLE
DAILY ACTIVITIY SCORE: 18
MOBILITY SCORE: 21

## 2025-02-04 ASSESSMENT — ACTIVITIES OF DAILY LIVING (ADL): LACK_OF_TRANSPORTATION: YES

## 2025-02-04 ASSESSMENT — PAIN SCALES - GENERAL
PAINLEVEL_OUTOF10: 8
PAINLEVEL_OUTOF10: 8
PAINLEVEL_OUTOF10: 9
PAINLEVEL_OUTOF10: 10 - WORST POSSIBLE PAIN

## 2025-02-04 ASSESSMENT — PAIN - FUNCTIONAL ASSESSMENT
PAIN_FUNCTIONAL_ASSESSMENT: 0-10
PAIN_FUNCTIONAL_ASSESSMENT: 0-10

## 2025-02-04 NOTE — CARE PLAN
The patient's goals for the shift include        Problem: Pain - Adult  Goal: Verbalizes/displays adequate comfort level or baseline comfort level  Outcome: Progressing     Problem: Safety - Adult  Goal: Free from fall injury  Outcome: Progressing     Problem: Respiratory  Goal: Minimize anxiety/maximize coping throughout shift  Outcome: Progressing

## 2025-02-04 NOTE — PROGRESS NOTES
02/04/25 1100   Discharge Planning   Living Arrangements Spouse/significant other   Support Systems Spouse/significant other   Type of Residence Private residence   Do you have animals or pets at home? Yes   Type of Animals or Pets dog   Who is requesting discharge planning? Provider   Home or Post Acute Services None   Expected Discharge Disposition Home   Does the patient need discharge transport arranged? No   Financial Resource Strain   How hard is it for you to pay for the very basics like food, housing, medical care, and heating? Somewhat   Housing Stability   In the last 12 months, was there a time when you were not able to pay the mortgage or rent on time? N   In the past 12 months, how many times have you moved where you were living? 0   At any time in the past 12 months, were you homeless or living in a shelter (including now)? N   Transportation Needs   In the past 12 months, has lack of transportation kept you from medical appointments or from getting medications? yes   In the past 12 months, has lack of transportation kept you from meetings, work, or from getting things needed for daily living? Yes     [facility] contacted patients spouse Corinne at following numbers Home  [phone]; cell [phone]. TCC left . Will continue to follow patient for discharge needs.  Selene STERN     Update @4346am- [facility] spoke with patients spouse and encouraged spouse to come to bedside as there are no accepting home health agencies due to insurance. Patients spouse verbalized understanding. Will continue to follow patient for discharge needs.Selene STERN

## 2025-02-04 NOTE — HOSPITAL COURSE
Sim Guaman is a 44 y.o. 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 Barnard), pharyngectomy, right parasymphyseal mandibulotomy with recon bar, bilateral SND 1-4, excision of R retropharyngeal LN, L RFFF with wound vac placement, STSG on 1/28/2025 with Dr. Ferguson and Dr. Mclaughlin. Patient had an uncomplicated surgical course. Patient recovered in PACU and was very anxious at this time. He was given diazepam per SICU recommendations and was transferred to [address] for post-operative care.    Patient post-operative course was complicated by soft pressures, need for PRBCs transfusion, and pain/anxiety. Patient's SBPs were soft and were resolved with boluses and eventually 1unit of PRBCs with a hb of 6.0. Likely due to his back drains were high output in the few days immediately postop. Supportive oncology followed patient closely with recommendations utilized.     IV pain medications were transitioned to enteral medications, eason was removed, tube feeds were initiated and advanced to home bolus,and initial tracheostomy was changed to a 4-0 cuffless Shiley on 2/2/2025. Patient then underwent capping trial and was decannulated on 2/4/2025. Home medications were restarted. Surgical drains were monitored until output had decreased and were removed.  Remaining surgical drains will be removed at follow-up visit. Patient to discharge with prevena wound vac and his PEG tube.     On day of discharge, post-operative pain was well controlled with enteral pain medication, breathing on room air, voiding spontaneously ambulating well, and was tolerating a diet. Home health/supplies arranged and delivered. Discharge plan was discussed with the patient/family and all questions were discussed and answered. Patient/family agreeable to plan. Patient discharged in stable condition. Follow-up arranged.

## 2025-02-04 NOTE — NURSING NOTE
Reviewed peg tube care, feed and med administration. Pt able to administer feed independently with minimal verbal guidance. Will continue to encourage pt to be hands on with peg care.

## 2025-02-04 NOTE — PROGRESS NOTES
"  Ear Nose & Throat Progess Note         Sim Guaman is a 44 y.o. male on day 7 of admission presenting with Cancer of soft palate (Multi). No acute events overnight. Flap checks stable.    Subjective   Patient this morning on rounds with complaints of not sleeping well overnight. His trach was changed and capped for 24hrs. He was able tolerate the capping trial without needed the cap removed or being suctioned.        Objective   Physical Exam  Vitals reviewed in EMR  Gen: NAD, AOx3, resting comfortably in bed  Eyes: EOMI, sclera clear, PERRL  Ears: Normal external ears bilaterally  Nose: No rhinorrhea; anterior nares clear  Oral Cavity: All intraoral incisions c/d/i without evidence of dehiscence  -Flap soft and pink with good capillary refill  Head: normocephalic, atraumatic  Face/Neck:All incisions c/d/i, neck soft and flat without evidence of hematoma or fluid collection  -Internal doppler with strong arterial signal  -Trach in midline position without bleeding or thick tracheal secretions  Resp: non-labored breathing on room air  Cards: RRR on Doppler  Gastro: Soft, non-distended,  PEG tube in place  : Voiding clear yellow urine  MSK: left arm warm with intact movement and sensation  -left thigh skin graft donor site with tegaderm in place  Psych: Appropriate mood and affect  Drains: All drains in place and holding suction with serosanguinous drainage (stripped)     Last Recorded Vitals  Blood pressure 133/81, pulse 92, temperature 36.5 °C (97.7 °F), temperature source Temporal, resp. rate 18, height 1.702 m (5' 7\"), weight 58.4 kg (128 lb 12 oz), SpO2 98%.  Intake/Output last 3 Shifts:  I/O last 3 completed shifts:  In: 1950 (33.4 mL/kg) [NG/GT:1950]  Out: 1827.5 (31.3 mL/kg) [Urine:1452 (0.7 mL/kg/hr); Drains:375.5]  Weight: 58.4 kg     Relevant Results  Results for orders placed or performed during the hospital encounter of 01/28/25 (from the past 24 hours)   Renal Function Panel   Result " Value Ref Range    Glucose 103 (H) 74 - 99 mg/dL    Sodium 136 136 - 145 mmol/L    Potassium 3.7 3.5 - 5.3 mmol/L    Chloride 101 98 - 107 mmol/L    Bicarbonate 27 21 - 32 mmol/L    Anion Gap 12 10 - 20 mmol/L    Urea Nitrogen 7 6 - 23 mg/dL    Creatinine 0.64 0.50 - 1.30 mg/dL    eGFR >90 >60 mL/min/1.73m*2    Calcium 8.8 8.6 - 10.6 mg/dL    Phosphorus 3.7 2.5 - 4.9 mg/dL    Albumin 2.9 (L) 3.4 - 5.0 g/dL   CBC   Result Value Ref Range    WBC 13.2 (H) 4.4 - 11.3 x10*3/uL    nRBC 0.0 0.0 - 0.0 /100 WBCs    RBC 3.09 (L) 4.50 - 5.90 x10*6/uL    Hemoglobin 8.9 (L) 13.5 - 17.5 g/dL    Hematocrit 26.8 (L) 41.0 - 52.0 %    MCV 87 80 - 100 fL    MCH 28.8 26.0 - 34.0 pg    MCHC 33.2 32.0 - 36.0 g/dL    RDW 22.5 (H) 11.5 - 14.5 %    Platelets 444 150 - 450 x10*3/uL   Magnesium   Result Value Ref Range    Magnesium 1.84 1.60 - 2.40 mg/dL        Scheduled medications  acetaminophen, 1,000 mg, g-tube, q8h ELANA  aspirin, 325 mg, g-tube, Daily  enoxaparin, 40 mg, subcutaneous, q24h  esomeprazole, 40 mg, g-tube, Daily before breakfast  gabapentin, 300 mg, g-tube, Nightly  hydrogen peroxide, 1 Application, Topical, TID  magnesium sulfate, 2 g, intravenous, Once  oxygen, , inhalation, Continuous - Inhalation  polyethylene glycol, 17 g, g-tube, Daily  senna, 10 mL, g-tube, BID  sodium chloride, 2 spray, Each Nostril, 4x daily  white petrolatum, 1 Application, Topical, TID      Continuous medications     PRN medications  PRN medications: clonazePAM, HYDROmorphone, naloxone, ondansetron, oxyCODONE, oxyCODONE                         Assessment/Plan   Assessment & Plan  Cancer of soft palate (Multi)    Assessment:  Sim Guaman is a 44 year old male with a diagnosis of Oropharyngeal SCC with known neck metastasis statu post Direct Laryngoscopy, tracheotomy, PEG (Akil), pharyngectomy, right parasymphyseal mandibulotomy with recon bar, bilateral Selective Neck Dissection levels I-IV, excision of R retropharyngeal Lymph Node,  reconconstruction with Left Radial Forearm Free Flap, and Split Thickness Skin Graft on 1/29/2025  with Dr. Mclaughlin and Dr. Ferguson.      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  Cervical Lymph Node Mets  Nicotine Dependence  Acute Blood Loss Anemia  Dysphagia  Severe Anxiety  Agitation  Tachycardia, self resolves when patient is clam  Hypokalemia  Hypomagnesia  Hypophosphatemia  Acute  on Chronic Pain         Plan:  -Flap Checks: q12hrs ENT team checks and nursing per flap protocol  -Drains: Monitor output  -Analgesia:  Dilaudid breakthrough pain, Scheduled Acetaminophen, Gabapentin 300mg Hs, oxy 15/25 every 3 hours  -Neuro: Restarted home clonazepam, Consulted Supportive Oncology   -FEN: mIVFs, NPO, TF at goal rate, transition to home bolus TF today, monitor and replete electrolytes as needed  -Pulm: s/p trach, humidified air at all times, standard trach care/suctioning/teaching; wean oxygen to room air, Capping trial initiated->tolerated; decannulated this AM  -ID: Unasyn 3g Q6hrs x 1day, stop date 1/29->Complete  -Cardiac: Vitals per ENT free flap protocol then transition to Q4hr vitals; Aspirin 325mg daily  -Heme: Trending H/H->8.9  -Endo: No current interventions  -GI: Bowel regimen, PPI,  and PRN anti-emetic  -: Abrams catheter discontinued, afternoon void check->voiding  -Steroids/Special: Incision and oral care per ENT free flap protocol  -Embolic PPx: SQLovenox, SCDs while in bed  -Dispo: [facility]. PT/OT ordered; Discharge planning for POD 6 home with home care vs skilled nursing facility              -Dispo planning difficult secondary to patient's insurance & location; it is true for both Home care & SNF      All plans discussed with attending after morning rounds.          I spent 35 minutes in the professional and overall care of this patient.      Ligia Gordon APRN, CNP  Certified Family Nurse Practitioner  Nurse Practitioner III  Department of Otolaryngology: Head & Neck Surgery  Personal  Pager [phone]  ENT Team  Head and Neck Phone: [phone]

## 2025-02-04 NOTE — PROGRESS NOTES
Physical Therapy    Physical Therapy Treatment    Patient Name: Sim Guaman  MRN: [id number]  Department: [facility]  Room: [address] [place] [address]  Today's Date: 2/4/2025  Time Calculation  Start Time: 1449  Stop Time: 1515  Time Calculation (min): 26 min         Assessment/Plan   PT Assessment  End of Session Communication: Bedside nurse  Assessment Comment: Pt able to ambulate increased distance this session, walking 200ft in hallway without device. Pt is appropriate for [facility] at this time.  End of Session Patient Position: Bed, 2 rail up, Alarm off, not on at start of session (Seated EOB)     PT Plan  Treatment/Interventions: Bed mobility, Transfer training, Stair training, Gait training, Balance training, Neuromuscular re-education, Strengthening, Endurance training, Therapeutic exercise, Therapeutic activity, Home exercise program, Positioning  PT Plan: Ongoing PT  PT Frequency: 3 times per week  PT Discharge Recommendations: Low intensity level of continued care (pending progress)  Equipment Recommended upon Discharge:  (TBD)  PT Recommended Transfer Status: Assist x1  PT - OK to Discharge: Yes (Once medically cleared)      General Visit Information:   PT  Visit  PT Received On: 02/04/25  Response to Previous Treatment: Patient with no complaints from previous session.  General  Family/Caregiver Present: No  Prior to Session Communication: Bedside nurse  Patient Position Received: Up in room  Preferred Learning Style: auditory, verbal, visual  General Comment: Pt pleasant and agreeable to PT session    Subjective   Precautions:  Precautions  Hearing/Visual Limitations: WFL  Medical Precautions: Fall precautions     Date/Time Vitals Session Patient Position Pulse Resp SpO2 BP MAP (mmHg)    02/04/25 1524 --  --  --  --  99 %  --  --                 Objective   Pain:  Pain Assessment  Pain Assessment: 0-10  0-10 (Numeric) Pain Score: 8  Pain Type: Acute pain  Pain Location: Head  Pain Interventions:  Ambulation/increased activity  Cognition:  Cognition  Overall Cognitive Status: Within Functional Limits  Orientation Level: Oriented X4  Insight: Mild  Impulsive: Mildly  Coordination:  Movements are Fluid and Coordinated: Yes  Postural Control:  Postural Control  Postural Control: Within Functional Limits  Static Sitting Balance  Static Sitting-Balance Support: Feet supported, No upper extremity supported  Static Sitting-Level of Assistance: Independent  Static Standing Balance  Static Standing-Balance Support: No upper extremity supported  Static Standing-Level of Assistance: Close supervision  Static Standing-Comment/Number of Minutes: 20+ mins  Dynamic Standing Balance  Dynamic Standing-Balance Support: No upper extremity supported  Dynamic Standing-Level of Assistance: Close supervision  Dynamic Standing-Balance: Reaching for objects  Extremity/Trunk Assessments:     RLE   RLE : Within Functional Limits  LLE   LLE : Within Functional Limits  Activity Tolerance:  Activity Tolerance  Endurance: Endurance does not limit participation in activity  Early Mobility/Exercise Safety Screen: Proceed with mobilization - No exclusion criteria met  Treatments:            Therapeutic Activity  Therapeutic Activity Performed: Yes  Therapeutic Activity 1: Static and dynamic standing balance while dressing and adjusting drains    Bed Mobility  Bed Mobility: No (Seated to start and end session)    Ambulation/Gait Training  Ambulation/Gait Training Performed: Yes  Ambulation/Gait Training 1  Surface 1: Level tile  Device 1: No device  Assistance 1: Close supervision  Quality of Gait 1: Wide base of support  Comments/Distance (ft) 1: 200ft  Transfers  Transfer: Yes  Transfer 1  Transfer From 1: Bed to, Stand to  Transfer to 1: Stand, Bed  Technique 1: Sit to stand, Stand to sit  Transfer Level of Assistance 1: Close supervision    Stairs  Stairs: No    Outcome Measures:  [facility] Basic Mobility  Turning from your back to your side  while in a flat bed without using bedrails: None  Moving from lying on your back to sitting on the side of a flat bed without using bedrails: None  Moving to and from bed to chair (including a wheelchair): None  Standing up from a chair using your arms (e.g. wheelchair or bedside chair): A little  To walk in hospital room: A little  Climbing 3-5 steps with railing: A little  Basic Mobility - Total Score: 21    Education Documentation  Precautions, taught by Zoey Mays PT at 2/4/2025  3:27 PM.  Learner: Patient  Readiness: Eager  Method: Explanation, Demonstration  Response: Verbalizes Understanding, Demonstrated Understanding    Body Mechanics, taught by Zoey Mays PT at 2/4/2025  3:27 PM.  Learner: Patient  Readiness: Eager  Method: Explanation, Demonstration  Response: Verbalizes Understanding, Demonstrated Understanding    Mobility Training, taught by Zoey Mays PT at 2/4/2025  3:27 PM.  Learner: Patient  Readiness: Eager  Method: Explanation, Demonstration  Response: Verbalizes Understanding, Demonstrated Understanding    Education Comments  No comments found.      Encounter Problems       Encounter Problems (Active)       PT Problem       Pt will perform supine to sit transfer independently  (Not Progressing)       Start:  01/29/25    Expected End:  02/12/25            Pt will ambulate 300 feet with mod I and LRAD  (Progressing)       Start:  01/29/25    Expected End:  02/12/25            Pt will ascend/descend 10 steps with mod I and LRAD  (Not Progressing)       Start:  01/29/25    Expected End:  02/12/25            Pt will score >24 on tinetti to ensure low falls risk  (Progressing)       Start:  01/29/25    Expected End:  02/12/25               Pain - Adult

## 2025-02-05 ENCOUNTER — SOCIAL WORK (OUTPATIENT)
Dept: CASE MANAGEMENT | Facility: HOSPITAL | Age: 45
End: 2025-02-05
Payer: COMMERCIAL

## 2025-02-05 LAB
ALBUMIN SERPL BCP-MCNC: 3 G/DL (ref 3.4–5)
ANION GAP SERPL CALC-SCNC: 12 MMOL/L (ref 10–20)
BUN SERPL-MCNC: 6 MG/DL (ref 6–23)
CALCIUM SERPL-MCNC: 8.5 MG/DL (ref 8.6–10.6)
CHLORIDE SERPL-SCNC: 99 MMOL/L (ref 98–107)
CO2 SERPL-SCNC: 26 MMOL/L (ref 21–32)
CREAT SERPL-MCNC: 0.48 MG/DL (ref 0.5–1.3)
EGFRCR SERPLBLD CKD-EPI 2021: >90 ML/MIN/1.73M*2
ERYTHROCYTE [DISTWIDTH] IN BLOOD BY AUTOMATED COUNT: 22.8 % (ref 11.5–14.5)
GLUCOSE SERPL-MCNC: 92 MG/DL (ref 74–99)
HCT VFR BLD AUTO: 29 % (ref 41–52)
HGB BLD-MCNC: 9.6 G/DL (ref 13.5–17.5)
MAGNESIUM SERPL-MCNC: 1.91 MG/DL (ref 1.6–2.4)
MCH RBC QN AUTO: 29.4 PG (ref 26–34)
MCHC RBC AUTO-ENTMCNC: 33.1 G/DL (ref 32–36)
MCV RBC AUTO: 89 FL (ref 80–100)
NRBC BLD-RTO: 0 /100 WBCS (ref 0–0)
PHOSPHATE SERPL-MCNC: 3 MG/DL (ref 2.5–4.9)
PLATELET # BLD AUTO: 528 X10*3/UL (ref 150–450)
POTASSIUM SERPL-SCNC: 4.1 MMOL/L (ref 3.5–5.3)
RBC # BLD AUTO: 3.27 X10*6/UL (ref 4.5–5.9)
SODIUM SERPL-SCNC: 133 MMOL/L (ref 136–145)
WBC # BLD AUTO: 12.9 X10*3/UL (ref 4.4–11.3)

## 2025-02-05 PROCEDURE — 2500000001 HC RX 250 WO HCPCS SELF ADMINISTERED DRUGS (ALT 637 FOR MEDICARE OP): Mod: SE

## 2025-02-05 PROCEDURE — 85027 COMPLETE CBC AUTOMATED: CPT | Performed by: STUDENT IN AN ORGANIZED HEALTH CARE EDUCATION/TRAINING PROGRAM

## 2025-02-05 PROCEDURE — 99233 SBSQ HOSP IP/OBS HIGH 50: CPT

## 2025-02-05 PROCEDURE — 82565 ASSAY OF CREATININE: CPT | Performed by: STUDENT IN AN ORGANIZED HEALTH CARE EDUCATION/TRAINING PROGRAM

## 2025-02-05 PROCEDURE — 2500000005 HC RX 250 GENERAL PHARMACY W/O HCPCS: Mod: SE

## 2025-02-05 PROCEDURE — 2500000005 HC RX 250 GENERAL PHARMACY W/O HCPCS: Mod: SE | Performed by: STUDENT IN AN ORGANIZED HEALTH CARE EDUCATION/TRAINING PROGRAM

## 2025-02-05 PROCEDURE — 36415 COLL VENOUS BLD VENIPUNCTURE: CPT | Performed by: STUDENT IN AN ORGANIZED HEALTH CARE EDUCATION/TRAINING PROGRAM

## 2025-02-05 PROCEDURE — 2500000001 HC RX 250 WO HCPCS SELF ADMINISTERED DRUGS (ALT 637 FOR MEDICARE OP): Mod: SE | Performed by: STUDENT IN AN ORGANIZED HEALTH CARE EDUCATION/TRAINING PROGRAM

## 2025-02-05 PROCEDURE — 83735 ASSAY OF MAGNESIUM: CPT | Performed by: STUDENT IN AN ORGANIZED HEALTH CARE EDUCATION/TRAINING PROGRAM

## 2025-02-05 PROCEDURE — 2500000004 HC RX 250 GENERAL PHARMACY W/ HCPCS (ALT 636 FOR OP/ED): Mod: SE | Performed by: STUDENT IN AN ORGANIZED HEALTH CARE EDUCATION/TRAINING PROGRAM

## 2025-02-05 PROCEDURE — 1170000001 HC PRIVATE ONCOLOGY ROOM DAILY

## 2025-02-05 PROCEDURE — 2500000001 HC RX 250 WO HCPCS SELF ADMINISTERED DRUGS (ALT 637 FOR MEDICARE OP): Mod: SE | Performed by: NURSE PRACTITIONER

## 2025-02-05 RX ORDER — HYDROCORTISONE 25 MG/G
CREAM TOPICAL 2 TIMES DAILY
Status: DISCONTINUED | OUTPATIENT
Start: 2025-02-05 | End: 2025-02-06 | Stop reason: HOSPADM

## 2025-02-05 RX ORDER — LIDOCAINE 560 MG/1
1 PATCH PERCUTANEOUS; TOPICAL; TRANSDERMAL DAILY
Status: DISCONTINUED | OUTPATIENT
Start: 2025-02-05 | End: 2025-02-06 | Stop reason: HOSPADM

## 2025-02-05 RX ADMIN — OXYCODONE HYDROCHLORIDE 25 MG: 5 SOLUTION ORAL at 18:59

## 2025-02-05 RX ADMIN — ESOMEPRAZOLE MAGNESIUM 40 MG: 40 FOR SUSPENSION ORAL at 06:00

## 2025-02-05 RX ADMIN — ACETAMINOPHEN 1000 MG: 160 SOLUTION ORAL at 14:32

## 2025-02-05 RX ADMIN — LIDOCAINE 1 PATCH: 4 PATCH TOPICAL at 11:15

## 2025-02-05 RX ADMIN — PETROLATUM 1 APPLICATION: 1 OINTMENT TOPICAL at 21:18

## 2025-02-05 RX ADMIN — PETROLATUM 1 APPLICATION: 1 OINTMENT TOPICAL at 11:14

## 2025-02-05 RX ADMIN — SALINE NASAL SPRAY 2 SPRAY: 1.5 SOLUTION NASAL at 19:09

## 2025-02-05 RX ADMIN — CLONAZEPAM 1 MG: 0.5 TABLET ORAL at 16:52

## 2025-02-05 RX ADMIN — HYDROGEN PEROXIDE 1 APPLICATION: 30 SOLUTION TOPICAL at 16:32

## 2025-02-05 RX ADMIN — SALINE NASAL SPRAY 2 SPRAY: 1.5 SOLUTION NASAL at 06:00

## 2025-02-05 RX ADMIN — ASPIRIN 325 MG ORAL TABLET 325 MG: 325 PILL ORAL at 10:06

## 2025-02-05 RX ADMIN — Medication 21 PERCENT: at 07:56

## 2025-02-05 RX ADMIN — ACETAMINOPHEN 1000 MG: 160 SOLUTION ORAL at 06:00

## 2025-02-05 RX ADMIN — SALINE NASAL SPRAY 2 SPRAY: 1.5 SOLUTION NASAL at 14:40

## 2025-02-05 RX ADMIN — HYDROGEN PEROXIDE 1 APPLICATION: 30 SOLUTION TOPICAL at 11:14

## 2025-02-05 RX ADMIN — SALINE NASAL SPRAY 2 SPRAY: 1.5 SOLUTION NASAL at 21:18

## 2025-02-05 RX ADMIN — GABAPENTIN 300 MG: 250 SOLUTION ORAL at 21:17

## 2025-02-05 RX ADMIN — Medication: at 16:47

## 2025-02-05 RX ADMIN — OXYCODONE HYDROCHLORIDE 25 MG: 5 SOLUTION ORAL at 22:07

## 2025-02-05 RX ADMIN — OXYCODONE HYDROCHLORIDE 25 MG: 5 SOLUTION ORAL at 14:32

## 2025-02-05 RX ADMIN — OXYCODONE HYDROCHLORIDE 25 MG: 5 SOLUTION ORAL at 08:01

## 2025-02-05 RX ADMIN — CLONAZEPAM 1 MG: 0.5 TABLET ORAL at 10:05

## 2025-02-05 RX ADMIN — CLONAZEPAM 1 MG: 0.5 TABLET ORAL at 02:32

## 2025-02-05 RX ADMIN — OXYCODONE HYDROCHLORIDE 25 MG: 5 SOLUTION ORAL at 01:15

## 2025-02-05 RX ADMIN — HYDROCORTISONE: 25 CREAM TOPICAL at 14:37

## 2025-02-05 RX ADMIN — HYDROGEN PEROXIDE 1 APPLICATION: 30 SOLUTION TOPICAL at 21:18

## 2025-02-05 RX ADMIN — OXYCODONE HYDROCHLORIDE 25 MG: 5 SOLUTION ORAL at 11:15

## 2025-02-05 RX ADMIN — OXYCODONE HYDROCHLORIDE 25 MG: 5 SOLUTION ORAL at 04:38

## 2025-02-05 RX ADMIN — CLONAZEPAM 1 MG: 0.5 TABLET ORAL at 21:17

## 2025-02-05 RX ADMIN — ACETAMINOPHEN 1000 MG: 160 SOLUTION ORAL at 22:06

## 2025-02-05 RX ADMIN — HYDROCORTISONE: 25 CREAM TOPICAL at 21:17

## 2025-02-05 RX ADMIN — PETROLATUM 1 APPLICATION: 1 OINTMENT TOPICAL at 16:32

## 2025-02-05 RX ADMIN — ENOXAPARIN SODIUM 40 MG: 100 INJECTION SUBCUTANEOUS at 10:07

## 2025-02-05 RX ADMIN — Medication 1 APPLICATION: at 04:43

## 2025-02-05 ASSESSMENT — PAIN SCALES - GENERAL
PAINLEVEL_OUTOF10: 10 - WORST POSSIBLE PAIN
PAINLEVEL_OUTOF10: 5 - MODERATE PAIN
PAINLEVEL_OUTOF10: 10 - WORST POSSIBLE PAIN

## 2025-02-05 ASSESSMENT — COGNITIVE AND FUNCTIONAL STATUS - GENERAL
DAILY ACTIVITIY SCORE: 23
WALKING IN HOSPITAL ROOM: A LITTLE
DRESSING REGULAR LOWER BODY CLOTHING: A LITTLE
EATING MEALS: A LITTLE
HELP NEEDED FOR BATHING: A LITTLE
DAILY ACTIVITIY SCORE: 18
CLIMB 3 TO 5 STEPS WITH RAILING: A LITTLE
MOBILITY SCORE: 22
CLIMB 3 TO 5 STEPS WITH RAILING: A LITTLE
TOILETING: A LITTLE
DRESSING REGULAR UPPER BODY CLOTHING: A LITTLE
WALKING IN HOSPITAL ROOM: A LITTLE
PERSONAL GROOMING: A LITTLE
EATING MEALS: A LITTLE
DAILY ACTIVITIY SCORE: 23
CLIMB 3 TO 5 STEPS WITH RAILING: A LITTLE
MOBILITY SCORE: 22
MOBILITY SCORE: 22
EATING MEALS: A LITTLE
WALKING IN HOSPITAL ROOM: A LITTLE

## 2025-02-05 ASSESSMENT — PAIN DESCRIPTION - ORIENTATION: ORIENTATION: RIGHT;LEFT

## 2025-02-05 ASSESSMENT — PAIN DESCRIPTION - LOCATION: LOCATION: HEAD

## 2025-02-05 ASSESSMENT — PAIN - FUNCTIONAL ASSESSMENT
PAIN_FUNCTIONAL_ASSESSMENT: UNABLE TO SELF-REPORT
PAIN_FUNCTIONAL_ASSESSMENT: 0-10

## 2025-02-05 ASSESSMENT — ACTIVITIES OF DAILY LIVING (ADL): LACK_OF_TRANSPORTATION: YES

## 2025-02-05 NOTE — PROGRESS NOTES
Social Work Note    Meeting Location: Phone  Person(s) Present: Pastor Jakob Herring, Kasandra ROBERSON   Identified Needs: Transportation   Impression and Plan: SHASHI received voicemail from Nima and pastor Robert sharing that there were issues with transportation.  shared that the  on the floor was having issues with transportation picking up the ride to take Nima back home. SHASHI sent Epic message to Jakob, shared that per the notes, SW Hugo Shah is using [employer] to transport him back home and typically ride is picked up by  closer to the time of departure. Shared that this SW is strictly outpatient and cannot assist with inpatient matters but is more than happy to connect with inpatient SW if it would help decrease anxiety. Will continue to follow.  Interventions Provided: Advocacy and Care Coordination  Estimated Time Spent: N/A

## 2025-02-05 NOTE — PROGRESS NOTES
"SUPPORTIVE AND PALLIATIVE ONCOLOGY INPATIENT FOLLOW-UP    SERVICE DATE: 02/05/25     Updates and Recommendations (02/05/25):  Anticipated plans for pt discharge. Pain and anxiety well controlled. Pt endorsing, \"I just need a few days worth of oxycodone 25mg doses, and then I will call my doctor [PCP].\" Pt plans to follow up with his PCP for continued pain and anxiety management s/p discharge.     Pt agreeable to discontinuation of hydromorphone IV PRN in anticipation for discharge.     ASSESSMENT/PLAN:  Sim Guaman is a 44 y.o. 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 PMHx significant for anxiety, hypothyroidism, and arthritis. Now s/p DL, tracheostomy, PEG, pharyngectomy, R parasymphyseal mandibulotomy with recon bar, bilat SND 1-4, excision of R retropharyngeal LN, recon with L RFFF, and STSG on 1/28/25 with with Dr. Mclaughlin and Dr. Ferguson. Supportive and Palliative Oncology is following for assistance with pain and anxiety management.     Neoplasm Related Pain  Post Operative Pain  Bilateral and anterior neck pain related to known malignancy. C/b accompanying headaches.   Acute post-operative pain 2/2 DL, tracheostomy, PEG, pharyngectomy, R parasymphyseal mandibulotomy with recon bar, bilat SND 1-4, excision of R retropharyngeal LN, recon with L RFFF, and STSG on 1/28/25.  Type: Somatic, possibly neuropathic  Pain control: Well controlled  Home regimen: acetaminophen, oxycodone IR 15mg q4h PRN   Intolerances: None  Previously tried: hydromorphone IR PO, Percocet  Reviewed: (1/10/25) Hepatic function historically WNL.  Renal function WNL.   Continue oxycodone solution 15mg via PEG q3h PRN for moderate pain  Continue oxycodone solution 25mg via PEG q3h PRN for severe pain  Discontinue hydromorphone 1mg IV q2h PRN for breakthrough pain [in anticipation for discharge]  Continue scheduled acetaminophen 1g oral liquid via PEG q8h  Continue gabapentin 300mg PO " once daily HS--will assist with neuropathic pain and possibly aide mood  Continue to monitor pain scores and administer PRN medications as appropriate  Continue/initiate nonpharmacologic pain management strategies including ice/heat therapy, distraction techniques, deep breathing/relaxation techniques, calming music, and repositioning  Continue to monitor for signs of opioid efficacy (pain scores, improved functionality) and toxicity (pinpoint pupils, excess sedation/drowsiness/confusion, respiratory depression, etc.)  Likely exacerbated by uncontrolled anxiety, see recs     Nausea  At risk for nausea and vomiting related to recent surgery and opioid use   Home regimen: None  No recent EKG on file in EMR.  PPI per primary   Continue ondansetron 4mg IV q8h PRN for n/v, first line [started by primary]     Constipation  At risk for constipation related to recent surgery and opioid use, currently not constipated.  Usual bowel pattern: Every day  Home regimen: None  LBM: 2/4/25  Continue scheduled Miralax 17g via PEG once daily  Continue scheduled senna syrup 10ml via PEG BID  Goal to have BM without straining q48-72h, adjust regimen as needed  Encourage mobility as tolerated, PT/OT following     Altered Mood  Acute on chronic anxiety related to pain and hospitalization.   Sub-optimally controlled.   Home regimen: clonazepam 1mg q6h PRN, had not been taking  buspirone at home  Continue clonazepam 1mg via PEG q6h PRN for anxiety  If need arises, consider consulting Psych for anxiety management and regimen optimization  Spiritual Care following for support     Altered Nutrition  Altered nutrition related to malignancy, recent surgery s/p trache/PEG, new TF initiation.   Weight loss: None  Home regimen: None  Nutrition consulted, appreciate recs  Currently on TF, tolerating     Disposition:  Please start the process of having prior authorization with meds to beds deliver medications to patient prior to discharge via [facility]  "pharmacy. Prescriptions will need to be sent 48-72 hours prior to discharge so that a prior authorization can be completed.      Discharge date pending resolution of acute hospital issues and symptom control.  Per discussion with primary team and pt, due to geographic location of pt's home, likely that pt will continue following with his existing PCP for pain and symptom management after discharge. PCP was previously managing pt's pain/symptom regimen.    SIGNATURE: MEGA Gomes   PAGER/CONTACT:  Contact information:  Supportive and Palliative Oncology  Monday-Friday 8 AM-5 PM  Epic Secure chat or pager [phone].  After hours and weekends:  pager [phone]    =====================================================================================================    SUBJECTIVE:    Interval Events:  Anticipated plans for pt discharge. Pain and anxiety well controlled. Pt endorsing, \"I just need a few days worth of oxycodone 25mg doses, and then I will call my doctor [PCP].\" Pt plans to follow up with his PCP for continued pain and anxiety management post discharge.     Pain Assessment:  Location: Bilateral and anterior neck, L forearm, L thigh  Duration: Constant  Characteristics:  Rating: Moderate, \"I still hurt some.\"   Descriptors: Aching, throbbing, sharp, poking  Aggravating: Movement, swallowing, coughing   Relieving: Analgesics--PCA, positioning, and modifying activity  Intolerances: None  Interference with Function: None    NPAT:   Emotion: 0  Movement: 0  Verbal Cues: 0  Facial Cues: 0  Positioning/Guardin  Total Score: 0         Opioid Requirements  Past 24h opioid requirements:  (2/3-, 9701-3909)  oxycodone IR 25mg x 7 = 175mg = 218.75 OME    Total 24h OME use: 218.75 OME    Symptom Assessment:  Nausea: none  Constipation: none  Anxiety: none--well controlled    Information obtained from: chart review, interview of patient, and discussion with primary " team  ______________________________________________________________________     OBJECTIVE:    Lab Results   Component Value Date    WBC 12.9 (H) 02/05/2025    HGB 9.6 (L) 02/05/2025    HCT 29.0 (L) 02/05/2025    MCV 89 02/05/2025     (H) 02/05/2025     Lab Results   Component Value Date    GLUCOSE 92 02/05/2025    CALCIUM 8.5 (L) 02/05/2025     (L) 02/05/2025    K 4.1 02/05/2025    CO2 26 02/05/2025    CL 99 02/05/2025    BUN 6 02/05/2025    CREATININE 0.48 (L) 02/05/2025     Lab Results   Component Value Date    ALT 14 01/10/2025    AST 28 01/10/2025    ALKPHOS 72 01/10/2025    BILITOT 0.2 01/10/2025     Estimated Creatinine Clearance: 125 mL/min (A) (by C-G formula based on SCr of 0.48 mg/dL (L)).    Scheduled medications   acetaminophen, 1,000 mg, g-tube, q8h ELANA  aspirin, 325 mg, g-tube, Daily  enoxaparin, 40 mg, subcutaneous, q24h  esomeprazole, 40 mg, g-tube, Daily before breakfast  gabapentin, 300 mg, g-tube, Nightly  hydrocortisone, , Topical, BID  hydrogen peroxide, 1 Application, Topical, TID  lidocaine, 1 patch, transdermal, Daily  oxygen, , inhalation, Continuous - Inhalation  polyethylene glycol, 17 g, g-tube, Daily  senna, 10 mL, g-tube, BID  sodium chloride, 2 spray, Each Nostril, 4x daily  white petrolatum, 1 Application, Topical, TID    Continuous medications     PRN medications  clonazePAM, 1 mg, 4x daily PRN  eucerin, , PRN  naloxone, 0.2 mg, q5 min PRN  ondansetron, 4 mg, q8h PRN  oxyCODONE, 15 mg, q3h PRN  oxyCODONE, 25 mg, q3h PRN    PHYSICAL EXAMINATION:    Vital Signs:   Vital signs reviewed  Visit Vitals  /82 (BP Location: Right arm, Patient Position: Lying)   Pulse (!) 123 Comment: rn notified. Pt stated he is in pain   Temp 36.5 °C (97.7 °F) (Temporal)   Resp 18      0-10 (Numeric) Pain Score: 10 - Worst possible pain     Physical Exam   itals reviewed.   Constitutional:       Comments: Alert, ill appearing gentleman standing in hospital room. No acute signs of  distress. Cooperative and participating in interview. Now able to talk around tracheostomy.  HENT:      Head:      Comments: Normocephalic, bilateral jaw line with mild non-pitting edema [improved since last visit].   Eyes:      Comments: Sclera clear, EOM intact.    Neck:      Comments: Tracheostomy midline. No evidence of drainage or bleeding at tracheostomy site. X3 PREM drains visualized.   Pulmonary:      Comments: Symmetrical chest rise. Regular rate and depth of respirations. Room air via trache.   Abdominal:      Comments: Abdomen non distended, non tender, and soft. PEG present.    Musculoskeletal:      Comments: Normal muscle strength and tone. ADAMS x4. No visible extremity edema.   Skin:     Comments: No lesions, rash, or abrasions present on visible skin. Skin color appropriate for ethnicity. L forearm wound vac in place.    Neurological:      Comments: A&Ox4, follows commands, no apparent sensory deficits.   Psychiatric:      Comments: Mood and behavior appropriate.     PALLIATIVE CARE ENCOUNTER:    Supportive and Palliative Oncology encounter:  Spoke with patient at bedside.  Emotional support provided.  Coordination of care: medication and symptom re-evaluation, discharge planning    Advance Directives  Existence of Advance Directives: None  Decision maker: Surrogate decision maker is pt's wifeCorinne ([phone])  Code Status: Full Code    =====================================================================================================    Signature and billing:  Medical complexity was high level due to due to complexity of problems, extensive data review, and high risk of management/treatment.    I spent 50 minutes in the care of this patient which included chart review, interviewing patient/family, discussion with primary team, coordination of care, and documentation.    Data:   Diagnostic tests and information reviewed for today's visit:  Conversation with primary team, Most recent labs,  Most recent EKG, Medications    Some elements copied from my note on 1/31/25, the elements have been updated and all reflect current decision making from today, 02/05/25     Plan of Care discussed with: Primary team, RN, pt     Thank you for asking Supportive and Palliative Oncology to assist with care of this patient.  Recommendations will be communicated back to the consulting service by way of shared electronic medical record/secure chat/email or face-to-face.   We will continue to follow.  Please contact us for additional questions or concerns.    SIGNATURE: SHEY Gomes-SUMAN   PAGER/CONTACT:  Contact information:  Supportive and Palliative Oncology  Monday-Friday 8 AM-5 PM, Epic Secure chat or pager [phone].  After hours and weekends: pager [phone]

## 2025-02-05 NOTE — PROGRESS NOTES
"  Ear Nose & Throat Progess Note         Sim Guaman is a 44 y.o. male on day 8 of admission presenting with Cancer of soft palate (Multi). No acute events overnight. Flap checks stable.    Subjective   Decannulated yesterday. Since decannlation, has done well without respiratory difficulties., Patient's only pain complaint during rounds is poor pain control and some erythema of left wrist.        Objective     Vitals reviewed in EMR  Gen: NAD, AOx3, resting comfortably in bed  Eyes: EOMI, sclera clear, PERRL  Ears: Normal external ears bilaterally  Nose: No rhinorrhea; anterior nares clear  Oral Cavity: All intraoral incisions c/d/i without evidence of dehiscence  -Flap soft and pink with good capillary refill  Head: normocephalic, atraumatic  Face/Neck:All incisions c/d/i, neck soft and flat without evidence of hematoma or fluid collection  -Internal doppler with strong arterial signal  -Stoma midline position without bleeding  Resp: non-labored breathing on room air  Cards: RRR on Doppler  Gastro: Soft, non-distended,  PEG tube in place  : Voiding clear yellow urine  MSK: left arm warm with intact movement and sensation  -left thigh skin graft donor site with tegaderm in place  Psych: Appropriate mood and affect  Drains: All drains in place and holding suction with serosanguinous drainage (stripped)     Last Recorded Vitals  Blood pressure (!) 119/92, pulse (!) 121, temperature 37.1 °C (98.8 °F), temperature source Temporal, resp. rate 18, height 1.702 m (5' 7\"), weight 58.4 kg (128 lb 12 oz), SpO2 97%.  Intake/Output last 3 Shifts:  I/O last 3 completed shifts:  In: 1280 (21.9 mL/kg) [NG/GT:1280]  Out: 1146 (19.6 mL/kg) [Urine:850 (0.4 mL/kg/hr); Drains:296]  Weight: 58.4 kg     Relevant Results  Results for orders placed or performed during the hospital encounter of 01/28/25 (from the past 24 hours)   Renal Function Panel   Result Value Ref Range    Glucose 92 74 - 99 mg/dL    Sodium 133 (L) 136 - " 145 mmol/L    Potassium 4.1 3.5 - 5.3 mmol/L    Chloride 99 98 - 107 mmol/L    Bicarbonate 26 21 - 32 mmol/L    Anion Gap 12 10 - 20 mmol/L    Urea Nitrogen 6 6 - 23 mg/dL    Creatinine 0.48 (L) 0.50 - 1.30 mg/dL    eGFR >90 >60 mL/min/1.73m*2    Calcium 8.5 (L) 8.6 - 10.6 mg/dL    Phosphorus 3.0 2.5 - 4.9 mg/dL    Albumin 3.0 (L) 3.4 - 5.0 g/dL   CBC   Result Value Ref Range    WBC 12.9 (H) 4.4 - 11.3 x10*3/uL    nRBC 0.0 0.0 - 0.0 /100 WBCs    RBC 3.27 (L) 4.50 - 5.90 x10*6/uL    Hemoglobin 9.6 (L) 13.5 - 17.5 g/dL    Hematocrit 29.0 (L) 41.0 - 52.0 %    MCV 89 80 - 100 fL    MCH 29.4 26.0 - 34.0 pg    MCHC 33.1 32.0 - 36.0 g/dL    RDW 22.8 (H) 11.5 - 14.5 %    Platelets 528 (H) 150 - 450 x10*3/uL   Magnesium   Result Value Ref Range    Magnesium 1.91 1.60 - 2.40 mg/dL        Scheduled medications  acetaminophen, 1,000 mg, g-tube, q8h ELANA  aspirin, 325 mg, g-tube, Daily  enoxaparin, 40 mg, subcutaneous, q24h  esomeprazole, 40 mg, g-tube, Daily before breakfast  gabapentin, 300 mg, g-tube, Nightly  hydrocortisone, , Topical, BID  hydrogen peroxide, 1 Application, Topical, TID  lidocaine, 1 patch, transdermal, Daily  oxygen, , inhalation, Continuous - Inhalation  polyethylene glycol, 17 g, g-tube, Daily  senna, 10 mL, g-tube, BID  sodium chloride, 2 spray, Each Nostril, 4x daily  white petrolatum, 1 Application, Topical, TID      Continuous medications     PRN medications  PRN medications: clonazePAM, eucerin, naloxone, ondansetron, oxyCODONE, oxyCODONE            Assessment/Plan   Assessment & Plan  Cancer of soft palate (Multi)    Assessment:  Sim Guaman is a 44 year old male with a diagnosis of Oropharyngeal SCC with known neck metastasis statu post Direct Laryngoscopy, tracheotomy, PEG (Akil), pharyngectomy, right parasymphyseal mandibulotomy with recon bar, bilateral Selective Neck Dissection levels I-IV, excision of R retropharyngeal Lymph Node, reconconstruction with Left Radial Forearm Free Flap,  and Split Thickness Skin Graft on 1/29/2025  with Dr. Mclaughlin and Dr. Ferguson.      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  Cervical Lymph Node Mets  Nicotine Dependence  Acute Blood Loss Anemia  Dysphagia  Severe Anxiety  Agitation  Tachycardia, self resolves when patient is clam  Hypokalemia  Hypomagnesia  Hypophosphatemia  Acute  on Chronic Pain         Plan:  -Flap Checks: q12hrs ENT team checks and nursing per flap protocol  -Drains: Monitor output  -Analgesia:  Discontinue Dilaudid, Scheduled Acetaminophen, Gabapentin 300mg Hs, oxy 15/25 every 3 hours. Per palliative/supportive oncology, discharge on 5 day supply, no more.   -Neuro: Restarted home clonazepam.  -FEN: mIVFs, NPO, home bolus TF, monitor and replete electrolytes as needed  -Pulm: s/p trach and decannulation  -ID: Unasyn 3g Q6hrs x 1day, stop date 1/29->Complete  -Cardiac: Vitals per ENT free flap protocol then transition to Q4hr vitals; Aspirin 325mg daily  -Heme: No current interventions  -Endo: No current interventions  -GI: Bowel regimen, PPI,  and PRN anti-emetic  -: Abrams catheter discontinued, voiding  -Steroids/Special: Incision and oral care per ENT free flap protocol  -Embolic PPx: SQLovenox, SCDs while in bed  -Dispo: [facility]. PT/OT ordered; Discharge planning for POD 6 home with home care vs skilled nursing facility              -Dispo planning difficult secondary to patient's insurance & location; it is true for both Home care & SNF      All plans discussed with attending after morning rounds.      Department of Otolaryngology: Head & Neck Surgery  ENT Team  Head and Neck Phone: [phone]

## 2025-02-05 NOTE — PROGRESS NOTES
Spiritual Care Visit        had a supportive visit with the patient, providing ongoing spiritual care and support. Patient was able to share some of his concerns with the  about the inability of his wife to come up and receive education around his care and the lack of transportation their family currently has. Patient shared he doesn't believe his wife can get up here and they have no other support people who would be able to come and pick him up.      shared concerns/question with SW and RN Manager after the visit and spoke about some of the barriers to discharge.      will continue to follow and provide support. Please reach out with any needs/concerns.     Rev. Lino Mike, Supportive Oncology

## 2025-02-05 NOTE — PROGRESS NOTES
02/05/25 1100   Discharge Planning   Living Arrangements Spouse/significant other   Support Systems Spouse/significant other   Assistance Needed skilled, PT/OT   Type of Residence Private residence   Do you have animals or pets at home? Yes   Type of Animals or Pets dog   Who is requesting discharge planning? Provider   Home or Post Acute Services In home services   Type of Home Care Services Home OT;Home PT;Home nursing visits   Expected Discharge Disposition Home H   Does the patient need discharge transport arranged? Yes   RoundTrip coordination needed? Yes   Has discharge transport been arranged? No   What day is the transport expected? 02/06/25   What time is the transport expected? 1600   Financial Resource Strain   How hard is it for you to pay for the very basics like food, housing, medical care, and heating? Somewhat   Housing Stability   In the last 12 months, was there a time when you were not able to pay the mortgage or rent on time? N   In the past 12 months, how many times have you moved where you were living? 0   At any time in the past 12 months, were you homeless or living in a shelter (including now)? N   Transportation Needs   In the past 12 months, has lack of transportation kept you from medical appointments or from getting medications? yes   In the past 12 months, has lack of transportation kept you from meetings, work, or from getting things needed for daily living? Yes     Pt set to discharge tomorrow but will need transport home.  Pt spouse is set to come bedside to learn pt care.  Pt was decannulated and will discharge with a prevena wound vac and his PEG.  SHASHI put pt transport in [employer] for 2/6 at 1600 to give time for pt supplies to deliver bedside.  [facility] will follow. [facility]

## 2025-02-05 NOTE — CARE PLAN
The patient's goals for the shift include      The clinical goals for the shift include       Problem: Pain - Adult  Goal: Verbalizes/displays adequate comfort level or baseline comfort level  Outcome: Progressing     Problem: Safety - Adult  Goal: Free from fall injury  Outcome: Progressing     Problem: Discharge Planning  Goal: Discharge to home or other facility with appropriate resources  Outcome: Progressing     Problem: Chronic Conditions and Co-morbidities  Goal: Patient's chronic conditions and co-morbidity symptoms are monitored and maintained or improved  Outcome: Progressing     Problem: Nutrition  Goal: Nutrient intake appropriate for maintaining nutritional needs  Outcome: Progressing     Problem: Pain  Goal: Takes deep breaths with improved pain control throughout the shift  Outcome: Progressing  Goal: Turns in bed with improved pain control throughout the shift  Outcome: Progressing  Goal: Walks with improved pain control throughout the shift  Outcome: Progressing  Goal: Performs ADL's with improved pain control throughout shift  Outcome: Progressing  Goal: Participates in PT with improved pain control throughout the shift  Outcome: Progressing  Goal: Free from opioid side effects throughout the shift  Outcome: Progressing  Goal: Free from acute confusion related to pain meds throughout the shift  Outcome: Progressing     Problem: Fall/Injury  Goal: Not fall by end of shift  Outcome: Progressing  Goal: Be free from injury by end of the shift  Outcome: Progressing     Problem: Respiratory  Goal: Clear secretions with interventions this shift  Outcome: Progressing  Goal: Minimize anxiety/maximize coping throughout shift  Outcome: Progressing  Goal: No signs of respiratory distress (eg. Use of accessory muscles. Peds grunting)  Outcome: Progressing  Goal: Patent airway maintained this shift  Outcome: Progressing  Goal: Wean oxygen to maintain O2 saturation per order/standard this shift  Outcome:  Progressing  Goal: Increase self care and/or family involvement in next 24 hours  Outcome: Progressing

## 2025-02-06 ENCOUNTER — PHARMACY VISIT (OUTPATIENT)
Dept: PHARMACY | Facility: CLINIC | Age: 45
End: 2025-02-06
Payer: MEDICAID

## 2025-02-06 ENCOUNTER — SOCIAL WORK (OUTPATIENT)
Dept: HEMATOLOGY/ONCOLOGY | Facility: HOSPITAL | Age: 45
End: 2025-02-06
Payer: COMMERCIAL

## 2025-02-06 VITALS
HEIGHT: 67 IN | WEIGHT: 128.75 LBS | DIASTOLIC BLOOD PRESSURE: 78 MMHG | RESPIRATION RATE: 16 BRPM | TEMPERATURE: 97.7 F | OXYGEN SATURATION: 98 % | BODY MASS INDEX: 20.21 KG/M2 | HEART RATE: 102 BPM | SYSTOLIC BLOOD PRESSURE: 126 MMHG

## 2025-02-06 DIAGNOSIS — C10.9 MALIGNANT NEOPLASM OF OROPHARYNX: Primary | ICD-10-CM

## 2025-02-06 LAB
ALBUMIN SERPL BCP-MCNC: 3 G/DL (ref 3.4–5)
ANION GAP SERPL CALC-SCNC: 8 MMOL/L (ref 10–20)
BUN SERPL-MCNC: 8 MG/DL (ref 6–23)
CALCIUM SERPL-MCNC: 8.7 MG/DL (ref 8.6–10.6)
CHLORIDE SERPL-SCNC: 98 MMOL/L (ref 98–107)
CO2 SERPL-SCNC: 31 MMOL/L (ref 21–32)
CREAT SERPL-MCNC: 0.58 MG/DL (ref 0.5–1.3)
EGFRCR SERPLBLD CKD-EPI 2021: >90 ML/MIN/1.73M*2
ERYTHROCYTE [DISTWIDTH] IN BLOOD BY AUTOMATED COUNT: 22.6 % (ref 11.5–14.5)
GLUCOSE SERPL-MCNC: 93 MG/DL (ref 74–99)
HCT VFR BLD AUTO: 28.9 % (ref 41–52)
HGB BLD-MCNC: 9.3 G/DL (ref 13.5–17.5)
MAGNESIUM SERPL-MCNC: 1.75 MG/DL (ref 1.6–2.4)
MCH RBC QN AUTO: 29.2 PG (ref 26–34)
MCHC RBC AUTO-ENTMCNC: 32.2 G/DL (ref 32–36)
MCV RBC AUTO: 91 FL (ref 80–100)
NRBC BLD-RTO: 0 /100 WBCS (ref 0–0)
PHOSPHATE SERPL-MCNC: 3.4 MG/DL (ref 2.5–4.9)
PLATELET # BLD AUTO: 536 X10*3/UL (ref 150–450)
POTASSIUM SERPL-SCNC: 4.4 MMOL/L (ref 3.5–5.3)
RBC # BLD AUTO: 3.19 X10*6/UL (ref 4.5–5.9)
SODIUM SERPL-SCNC: 133 MMOL/L (ref 136–145)
WBC # BLD AUTO: 12.5 X10*3/UL (ref 4.4–11.3)

## 2025-02-06 PROCEDURE — 2500000004 HC RX 250 GENERAL PHARMACY W/ HCPCS (ALT 636 FOR OP/ED): Mod: SE | Performed by: STUDENT IN AN ORGANIZED HEALTH CARE EDUCATION/TRAINING PROGRAM

## 2025-02-06 PROCEDURE — 2500000001 HC RX 250 WO HCPCS SELF ADMINISTERED DRUGS (ALT 637 FOR MEDICARE OP): Mod: SE | Performed by: STUDENT IN AN ORGANIZED HEALTH CARE EDUCATION/TRAINING PROGRAM

## 2025-02-06 PROCEDURE — 80069 RENAL FUNCTION PANEL: CPT | Performed by: STUDENT IN AN ORGANIZED HEALTH CARE EDUCATION/TRAINING PROGRAM

## 2025-02-06 PROCEDURE — RXMED WILLOW AMBULATORY MEDICATION CHARGE

## 2025-02-06 PROCEDURE — 2500000001 HC RX 250 WO HCPCS SELF ADMINISTERED DRUGS (ALT 637 FOR MEDICARE OP): Mod: SE | Performed by: NURSE PRACTITIONER

## 2025-02-06 PROCEDURE — 85027 COMPLETE CBC AUTOMATED: CPT | Performed by: STUDENT IN AN ORGANIZED HEALTH CARE EDUCATION/TRAINING PROGRAM

## 2025-02-06 PROCEDURE — 36415 COLL VENOUS BLD VENIPUNCTURE: CPT | Performed by: STUDENT IN AN ORGANIZED HEALTH CARE EDUCATION/TRAINING PROGRAM

## 2025-02-06 PROCEDURE — 99239 HOSP IP/OBS DSCHRG MGMT >30: CPT | Performed by: NURSE PRACTITIONER

## 2025-02-06 PROCEDURE — 2500000005 HC RX 250 GENERAL PHARMACY W/O HCPCS: Mod: SE

## 2025-02-06 PROCEDURE — 83735 ASSAY OF MAGNESIUM: CPT | Performed by: STUDENT IN AN ORGANIZED HEALTH CARE EDUCATION/TRAINING PROGRAM

## 2025-02-06 RX ORDER — GABAPENTIN 250 MG/5ML
300 SOLUTION ORAL NIGHTLY
Qty: 100 ML | Refills: 0 | Status: SHIPPED | OUTPATIENT
Start: 2025-02-06 | End: 2025-02-11 | Stop reason: SDUPTHER

## 2025-02-06 RX ORDER — OXYCODONE HYDROCHLORIDE 15 MG/1
15 TABLET ORAL EVERY 4 HOURS PRN
Start: 2025-02-11

## 2025-02-06 RX ORDER — HYDROCORTISONE 25 MG/G
CREAM TOPICAL 2 TIMES DAILY
Start: 2025-02-06 | End: 2025-02-06

## 2025-02-06 RX ORDER — MULTIVIT-MIN/IRON FUM/FOLIC AC 7.5 MG-4
1 TABLET ORAL DAILY
Start: 2025-02-06

## 2025-02-06 RX ORDER — CLONAZEPAM 1 MG/1
1 TABLET ORAL EVERY 6 HOURS PRN
Start: 2025-02-06 | End: 2025-02-06

## 2025-02-06 RX ORDER — POLYETHYLENE GLYCOL 3350 17 G/17G
17 POWDER, FOR SOLUTION ORAL DAILY
Start: 2025-02-06 | End: 2025-02-06

## 2025-02-06 RX ORDER — HYDROCORTISONE 25 MG/G
CREAM TOPICAL 2 TIMES DAILY
Qty: 30 G | Refills: 0 | Status: SHIPPED | OUTPATIENT
Start: 2025-02-06

## 2025-02-06 RX ORDER — ALPRAZOLAM 0.25 MG/1
0.25 TABLET ORAL ONCE
Status: DISCONTINUED | OUTPATIENT
Start: 2025-02-06 | End: 2025-02-06

## 2025-02-06 RX ORDER — ASPIRIN 325 MG
325 TABLET ORAL DAILY
Qty: 23 TABLET | Refills: 0 | Status: SHIPPED | OUTPATIENT
Start: 2025-02-06 | End: 2025-03-01

## 2025-02-06 RX ORDER — OXYCODONE HCL 5 MG/5 ML
15 SOLUTION, ORAL ORAL EVERY 4 HOURS PRN
Start: 2025-02-06 | End: 2025-02-06

## 2025-02-06 RX ORDER — PETROLATUM 420 MG/G
1 OINTMENT TOPICAL 2 TIMES DAILY
Start: 2025-02-06 | End: 2025-02-06

## 2025-02-06 RX ORDER — POLYETHYLENE GLYCOL 3350 17 G/17G
17 POWDER, FOR SOLUTION ORAL DAILY
Qty: 20 PACKET | Refills: 1 | Status: SHIPPED | OUTPATIENT
Start: 2025-02-06

## 2025-02-06 RX ORDER — OXYCODONE HCL 5 MG/5 ML
15 SOLUTION, ORAL ORAL EVERY 4 HOURS PRN
Qty: 300 ML | Refills: 0 | Status: SHIPPED | OUTPATIENT
Start: 2025-02-06 | End: 2025-02-11

## 2025-02-06 RX ORDER — GABAPENTIN 250 MG/5ML
300 SOLUTION ORAL NIGHTLY
Start: 2025-02-06 | End: 2025-02-06

## 2025-02-06 RX ORDER — LIDOCAINE 560 MG/1
1 PATCH PERCUTANEOUS; TOPICAL; TRANSDERMAL DAILY
Start: 2025-02-06 | End: 2025-02-06

## 2025-02-06 RX ORDER — SENNOSIDES 8.8 MG/5ML
10 LIQUID ORAL 2 TIMES DAILY
Qty: 237 ML | Refills: 0 | Status: SHIPPED | OUTPATIENT
Start: 2025-02-06

## 2025-02-06 RX ORDER — LIDOCAINE 560 MG/1
1 PATCH PERCUTANEOUS; TOPICAL; TRANSDERMAL DAILY
Qty: 5 PATCH | Refills: 0 | Status: SHIPPED | OUTPATIENT
Start: 2025-02-06

## 2025-02-06 RX ORDER — CLONAZEPAM 1 MG/1
1 TABLET ORAL EVERY 6 HOURS PRN
Start: 2025-02-06

## 2025-02-06 RX ORDER — ACETAMINOPHEN 160 MG/5ML
1000 LIQUID ORAL EVERY 8 HOURS PRN
Qty: 500 ML | Refills: 0 | Status: SHIPPED | OUTPATIENT
Start: 2025-02-06

## 2025-02-06 RX ORDER — PETROLATUM 420 MG/G
1 OINTMENT TOPICAL 2 TIMES DAILY
Qty: 100 G | Refills: 0 | Status: SHIPPED | OUTPATIENT
Start: 2025-02-06

## 2025-02-06 RX ORDER — ASPIRIN 325 MG
325 TABLET ORAL DAILY
Start: 2025-02-06 | End: 2025-02-06

## 2025-02-06 RX ORDER — SENNOSIDES 8.8 MG/5ML
10 LIQUID ORAL 2 TIMES DAILY
Start: 2025-02-06 | End: 2025-02-06

## 2025-02-06 RX ORDER — ACETAMINOPHEN 160 MG/5ML
1000 SOLUTION ORAL EVERY 8 HOURS PRN
Start: 2025-02-06 | End: 2025-02-06

## 2025-02-06 RX ADMIN — Medication: at 09:43

## 2025-02-06 RX ADMIN — SALINE NASAL SPRAY 2 SPRAY: 1.5 SOLUTION NASAL at 13:09

## 2025-02-06 RX ADMIN — OXYCODONE HYDROCHLORIDE 25 MG: 5 SOLUTION ORAL at 06:27

## 2025-02-06 RX ADMIN — OXYCODONE HYDROCHLORIDE 25 MG: 5 SOLUTION ORAL at 09:42

## 2025-02-06 RX ADMIN — HYDROGEN PEROXIDE 1 APPLICATION: 30 SOLUTION TOPICAL at 09:43

## 2025-02-06 RX ADMIN — PETROLATUM 1 APPLICATION: 1 OINTMENT TOPICAL at 09:43

## 2025-02-06 RX ADMIN — CLONAZEPAM 1 MG: 0.5 TABLET ORAL at 11:21

## 2025-02-06 RX ADMIN — ACETAMINOPHEN 1000 MG: 160 SOLUTION ORAL at 06:27

## 2025-02-06 RX ADMIN — OXYCODONE HYDROCHLORIDE 25 MG: 5 SOLUTION ORAL at 01:23

## 2025-02-06 RX ADMIN — HYDROCORTISONE: 25 CREAM TOPICAL at 09:44

## 2025-02-06 RX ADMIN — ASPIRIN 325 MG ORAL TABLET 325 MG: 325 PILL ORAL at 09:41

## 2025-02-06 RX ADMIN — ACETAMINOPHEN 1000 MG: 160 SOLUTION ORAL at 13:56

## 2025-02-06 RX ADMIN — OXYCODONE HYDROCHLORIDE 25 MG: 5 SOLUTION ORAL at 13:07

## 2025-02-06 RX ADMIN — ESOMEPRAZOLE MAGNESIUM 40 MG: 40 FOR SUSPENSION ORAL at 06:27

## 2025-02-06 RX ADMIN — LIDOCAINE 1 PATCH: 4 PATCH TOPICAL at 09:42

## 2025-02-06 RX ADMIN — ENOXAPARIN SODIUM 40 MG: 100 INJECTION SUBCUTANEOUS at 09:41

## 2025-02-06 RX ADMIN — PETROLATUM 1 APPLICATION: 1 OINTMENT TOPICAL at 13:57

## 2025-02-06 RX ADMIN — HYDROGEN PEROXIDE 1 APPLICATION: 30 SOLUTION TOPICAL at 13:56

## 2025-02-06 ASSESSMENT — PAIN - FUNCTIONAL ASSESSMENT
PAIN_FUNCTIONAL_ASSESSMENT: 0-10

## 2025-02-06 ASSESSMENT — PAIN SCALES - GENERAL
PAINLEVEL_OUTOF10: 10 - WORST POSSIBLE PAIN

## 2025-02-06 NOTE — PROGRESS NOTES
02/05/25 1100   Discharge Planning   Living Arrangements Spouse/significant other   Support Systems Spouse/significant other   Assistance Needed skilled, PT/OT   Type of Residence Private residence   Do you have animals or pets at home? Yes   Type of Animals or Pets dog   Who is requesting discharge planning? Provider   Home or Post Acute Services In home services   Type of Home Care Services Home OT;Home PT;Home nursing visits   Expected Discharge Disposition Home H   Does the patient need discharge transport arranged? Yes   RoundTrip coordination needed? Yes   Has discharge transport been arranged? No   What day is the transport expected? 02/06/25   What time is the transport expected? 1600   Financial Resource Strain   How hard is it for you to pay for the very basics like food, housing, medical care, and heating? Somewhat   Housing Stability   In the last 12 months, was there a time when you were not able to pay the mortgage or rent on time? N   In the past 12 months, how many times have you moved where you were living? 0   At any time in the past 12 months, were you homeless or living in a shelter (including now)? N   Transportation Needs   In the past 12 months, has lack of transportation kept you from medical appointments or from getting medications? yes   In the past 12 months, has lack of transportation kept you from meetings, work, or from getting things needed for daily living? Yes     Pt set to discharge tomorrow but will need transport home.  Pt spouse is set to come bedside to learn pt care.  Pt was decannulated and will discharge with a prevena wound vac and his PEG.  SHASHI put pt transport in [employer] for 2/6 at 1600 to give time for pt supplies to deliver bedside.  SHASHI will follow. Hugo Guzman [facility]    02/06/2025 0800  Pt's insurance just accepted the transport request in [employer] for 1600 this afternoon.  [id number] will take pt home.  Barrier to securing transport is pt insurance  and the distance (pt lives approx 126 miles away).  SW will follow. Hugo Guzman [facility]    02/06/2025 0810  SW received a phone call from [employer] dispatch reporting that pt transport will not arrive until approx 2030 tonight, (regardless of ambulatory vs wheelchair transport) due to the distance needed to travel.  [facility] assistant nurse manager agreed that pt spouse does not need to come bedside to learn the care if pt has shown that he can take care of his PEG with TF and wound vac. Transport is spouse's barrier to come bedside.  Care team will confirm with attending and nursing staff that pt knows his care.  Pt spouse may learn remotely; nursing/care team may arrange [employer] lessons or take video of the care for her to refer to.  SW will follow. Hugo Guzman [facility]    02/06/2025 0825  Dispatch from [employer] called SW back reporting that transport has been found for the original 1600 request.  Care team notified.  SW will follow. Hugo Guzman [facility]    02/06/2025 1430  Pt spouse completed nursing teaching via [employer].  Transport still set for 1600 today.  Pt supplies are bedside.  Bedside nurse will take pt down to [facility] prior to transport time.  SW will follow. Hugo Guzman [facility]

## 2025-02-06 NOTE — NURSING NOTE
Discharge Education Note:    RN called patient's wife Casandra via [employer] for education. During call RN discussed incision care and peg tube. For neck incision, RN verbalized and demonstrated that they will use a clean 4X4 gauze, mild soap (like Chris and Chris baby shampoo), and water. First they will get the gauze wet and apply a small amount of soap and then gently dab the incisions. Next they grab a second clean 4X4 gauze and apply just water to rinse the soap off. Lastly they will use a Q-Tip to apply Vaseline to incision line. For Left thigh STSG Patient demonstrated how to apply eucerin cream to site while wife watched via [employer]. RN will call wife back this afternoon to discuss left forearm incision/dressing care.     For Peg tube, RN explained that all tablets will need crushed and mixed with room temperature water. They will use the piston syringe and attach it to the peg tube. Patient demonstrated this while wife watched via [employer]. Then patient showed his wife and RN how to flush peg tube with water, administer medications, and then flush the peg tube again with water. Patient demonstrated to Rn and wife how to administer some tube feed via peg with water flushes before and after tube feed as well as how to work the clamp.

## 2025-02-06 NOTE — PROGRESS NOTES
02/04/25 1100   Discharge Planning   Living Arrangements Spouse/significant other   Support Systems Spouse/significant other   Type of Residence Private residence   Do you have animals or pets at home? Yes   Type of Animals or Pets dog   Who is requesting discharge planning? Provider   Home or Post Acute Services None   Expected Discharge Disposition Home   Does the patient need discharge transport arranged? No   Financial Resource Strain   How hard is it for you to pay for the very basics like food, housing, medical care, and heating? Somewhat   Housing Stability   In the last 12 months, was there a time when you were not able to pay the mortgage or rent on time? N   In the past 12 months, how many times have you moved where you were living? 0   At any time in the past 12 months, were you homeless or living in a shelter (including now)? N   Transportation Needs   In the past 12 months, has lack of transportation kept you from medical appointments or from getting medications? yes   In the past 12 months, has lack of transportation kept you from meetings, work, or from getting things needed for daily living? Yes     TCC contacted patients spouse Corinne at following numbers Home  [phone]; cell [phone]. TCC left vm. Will continue to follow patient for discharge needs.  Selene Carrizales RN [facility]     Update @1156am- [facility] spoke with patients spouse and encouraged spouse to come to bedside as there are no accepting home health agencies due to insurance. Patients spouse verbalized understanding. Will continue to follow patient for discharge needs.Selene STERN     2/6/25-TCC provided patient with 7 days of dressing supplies. Confirmed tube feed is at bedside. There are no accepting home care agencies at this time due to insurance. TCC will continue to follow patient for discharge needs.  Selene Carrizales RN [facility]

## 2025-02-06 NOTE — CARE PLAN
The patient's goals for the shift include      The clinical goals for the shift include patient will remain safe and injury free throughout shift.    Over the shift, the patient did not make progress toward the following goals. Barriers to progression include none, patient to be discharged home today. Recommendations   to address these barriers include none.      Problem: Pain - Adult  Goal: Verbalizes/displays adequate comfort level or baseline comfort level  Outcome: Met     Problem: Safety - Adult  Goal: Free from fall injury  Outcome: Met     Problem: Discharge Planning  Goal: Discharge to home or other facility with appropriate resources  Outcome: Met     Problem: Chronic Conditions and Co-morbidities  Goal: Patient's chronic conditions and co-morbidity symptoms are monitored and maintained or improved  Outcome: Met     Problem: Nutrition  Goal: Nutrient intake appropriate for maintaining nutritional needs  Outcome: Met     Problem: Pain  Goal: Takes deep breaths with improved pain control throughout the shift  Outcome: Met  Goal: Turns in bed with improved pain control throughout the shift  Outcome: Met  Goal: Walks with improved pain control throughout the shift  Outcome: Met  Goal: Performs ADL's with improved pain control throughout shift  Outcome: Met  Goal: Participates in PT with improved pain control throughout the shift  Outcome: Met  Goal: Free from opioid side effects throughout the shift  Outcome: Met  Goal: Free from acute confusion related to pain meds throughout the shift  Outcome: Met     Problem: Fall/Injury  Goal: Not fall by end of shift  Outcome: Met  Goal: Be free from injury by end of the shift  Outcome: Met     Problem: Respiratory  Goal: Clear secretions with interventions this shift  Outcome: Met  Goal: Minimize anxiety/maximize coping throughout shift  Outcome: Met  Goal: No signs of respiratory distress (eg. Use of accessory muscles. Peds grunting)  Outcome: Met  Goal: Patent airway  maintained this shift  Outcome: Met  Goal: Wean oxygen to maintain O2 saturation per order/standard this shift  Outcome: Met  Goal: Increase self care and/or family involvement in next 24 hours  Outcome: Met

## 2025-02-06 NOTE — NURSING NOTE
Wound Vac discontinued due to patient being discharged. RN called wound vac line for pick-up and placed wound vac in [facility].

## 2025-02-06 NOTE — NURSING NOTE
Education Note:    RN utilized [employer] to call patient's wife Casandra. During this facetime call, we discussed the left forearm dressing change. RN showed wife and patient each piece of dressing supplies. RN demonstrated to patient and his wife dressing change. RN explained that xeroform goes over donor site first, then a piece of telfa goes over the xeroform, then they will wrap the arm with kerlex, and then wrap the arm again with an ace wrap. After that they will need to put the brace back on. RN demonstrated that to patient and his wife.     RN also showed patient and his wife how to change the dressing for the healing trach stoma. RN showed them how to apply a folded up 4X4 gauze and then pressure tape over the site.     RN also read through patient's discharge paperwork and explained to wife what the medications are. RN told both of them when their follow-up appointments are. RN emphasized to both patient and his wife that he needs to do the full amount of 300mL four times a day for the tube feed so he gets his nutrition.     RN asked numerous times if patient's wife Casandra had any questions, she stated she did not. RN told her to call back to patient's phone and RN can come into room to answer any questions.

## 2025-02-06 NOTE — NURSING NOTE
Patient discharged home via a ride provided by [facility]. RN reviewed discharge papers with patient and his wife via [employer]. RN provided discharge education on incisions and peg tube via facetime calls with patient's wife. Patient sent home with supplies for incision care, left forearm dressing, a case of tube feed, prescriptions delivered via meds to beds, and piston syringe for peg tube. Peripheral IV removed, catheter intact, gauze dressing in place. RN took patient to [facility] to meet ride.

## 2025-02-06 NOTE — NURSING NOTE
NP Walker notified that patient is refusing tube feed until he gets home tonight. RN educated patient on importance of taking tube feed to heal.

## 2025-02-06 NOTE — PROGRESS NOTES
"Transportation Referral     Referral Source: Medical team  Multiple Rides Needed? Yes - 02/11/25 and 02/24/25  First Date Transportation is needed? 02/11/2025  Ambulation: Independently  Pick-up Address: [place], [place] (home)  Patient Phone: [phone]   Accompaniment: No  Phone Receives Text Messages? Yes  [facility] [facility] [place] (p\" [phone])      Scheduled Ride(s):     Date: 02/11/2025    Appointment: Follow up   [place] [facility] [place] [id number] [place] [address]   Time: 10 am (give or take 15 minutes before or after) for his 1:45 p.m. appt.  Return Ride: 2:45 p.m. (give or take 15 minutes before or after)   Confirmation: [id number]    Date: 02/24/2025   Appointment: follow up     [place] [facility] [place] [id number] [place] [address]   Time: 8:30 a.m. (give or take 15 minutes) for his 11 a.m. appointment   Return Ride: 1 p.m. (give or take 15 minutes)   Confirmation: [id number]    Medical team updated that transport is scheduled. SW will continue to follow as needed.    "

## 2025-02-06 NOTE — DISCHARGE SUMMARY
Discharge Diagnosis  Cancer of soft palate (Multi)    Issues Requiring Follow-Up  Post op visit and drain removal    Test Results Pending At Discharge  Pending Labs       Order Current Status    Surgical Pathology Exam In process    Surgical Pathology Exam In process            Hospital Course  Sim Guaman is a 44 y.o. 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 Barnard), pharyngectomy, right parasymphyseal mandibulotomy with recon bar, bilateral SND 1-4, excision of R retropharyngeal LN, L RFFF with wound vac placement, STSG on 1/28/2025 with Dr. Ferguson and Dr. Mclaughlin. Patient had an uncomplicated surgical course. Patient recovered in PACU and was very anxious at this time. He was given diazepam per SICU recommendations and was transferred to [place] [address] nursing floor for post-operative care.    Patient post-operative course was complicated by soft pressures, need for PRBCs transfusion, and pain/anxiety. Patient's SBPs were soft and were resolved with boluses and eventually 1unit of PRBCs with a hb of 6.0. Likely due to his back drains were high output in the few days immediately postop. Supportive oncology followed patient closely with recommendations utilized.     IV pain medications were transitioned to enteral medications, eason was removed, tube feeds were initiated and advanced to home bolus,and initial tracheostomy was changed to a 4-0 cuffless Shiley on 2/2/2025. Patient then underwent capping trial and was decannulated on 2/4/2025. Home medications were restarted. Surgical drains were monitored until output had decreased and were removed.  Remaining surgical drains will be removed at follow-up visit. Patient to discharge with prevena wound vac and his PEG tube.     On day of discharge, post-operative pain was well controlled with enteral pain medication, breathing on room air, voiding spontaneously ambulating well, and was tolerating  a diet. Home health/supplies arranged and delivered. Discharge plan was discussed with the patient/family and all questions were discussed and answered. Patient/family agreeable to plan. Patient discharged in stable condition. Follow-up arranged.    Patient's wife is receiving discharge education via [email or web address]. The discharge info was emailed to the wife. Out patient social work has schedule transportation for both of his post op appointments.    Pertinent Physical Exam At Time of Discharge  Physical Exam  Gen: NAD, AOx3, resting comfortably in bed  Eyes: EOMI, sclera clear, PERRL  Ears: Normal external ears bilaterally  Nose: No rhinorrhea; anterior nares clear  Oral Cavity: All intraoral incisions c/d/i without evidence of dehiscence  -Flap soft and pink with good capillary refill  Head: normocephalic, atraumatic  Face/Neck:All incisions c/d/i, neck soft and flat without evidence of hematoma or fluid collection  -Internal doppler with strong arterial signal-> removed prior discharge  -Stoma midline position without bleeding  Resp: non-labored breathing on room air  Cards: RRR on Doppler-> removed prior discharge  Gastro: Soft, non-distended,  PEG tube in place  : Voiding clear yellow urine  MSK: left arm warm with intact movement and sensation  -left thigh skin graft donor site with tegaderm in place  Psych: Appropriate mood and affect  Drains: All drains in place and holding suction with serosanguinous drainage (stripped)    Home Medications     Medication List      START taking these medications     aspirin 325 mg tablet; 1 tablet (325 mg) by g-tube route once daily for   23 doses.   Children's acetaminophen 160 mg/5 mL liquid; Generic drug:   acetaminophen; 31.3 mL (1,000 mg) by g-tube route every 8 hours if needed.   chlorhexidine 0.12 % solution; Commonly known as: Peridex; Use 15 mL in   the mouth or throat 3 times a day after meals for 10 days.   Deep Sea Nasal 0.65 % nasal spray; Generic drug:  "sodium chloride;   Administer 2 sprays into each nostril 4 times a day.   gabapentin 50 mg/mL solution; Commonly known as: Neurontin; 6 mL (300   mg) by g-tube route once daily at bedtime.   gauze bandage 4 1/2 X 22 \" sponge; Apply 1 each topically once daily.   Dressing to free flap site is to be changed daily. Apply Xeroform then   cover with telfa. Wrap with kerlix to hold in place. Wrap with an ace   wrap.   hydrocortisone 2.5 % cream; Apply topically 2 times a day.   Lidocaine Pain Relief 4 % patch; Generic drug: lidocaine; Place 1 patch   over 12 hours on the skin once daily. Remove & discard patch within 12   hours or as directed by MD.   Minerin Creme cream; Generic drug: eucerin; Apply topically if needed   for dry skin.   naloxone 4 mg/0.1 mL nasal spray; Commonly known as: Narcan; Administer   1 spray (4 mg) into affected nostril(s) if needed for opioid reversal or   respiratory depression. May repeat every 2-3 minutes if needed,   alternating nostrils, until medical assistance becomes available.   non-adherent bandage 3 X 8 \" bandage; Apply 1 each topically once daily.   Dressing to free flap site is to be changed daily. Apply Xeroform then   cover with telfa. Wrap with kerlix to hold in place. Wrap with an ace   wrap.   polyethylene glycol 17 gram packet; Commonly known as: Glycolax,   Miralax; 17 g by g-tube route once daily.   senna 8.8 mg/5 mL syrup; Commonly known as: Senokot; 10 mL by g-tube   route 2 times a day.   white petrolatum 41 % ointment ointment; Commonly known as: Aquaphor;   Apply 1 Application topically 2 times a day.   Xeroform 5 X 9 \" bandage; Generic drug: bismuth tribrom-petrolatum,wh;   Apply 1 each topically once daily. Dressing to free flap site is to be   changed daily. Apply Xeroform then cover with telfa. Wrap with kerlix to   hold in place. Wrap with an ace wrap. Almeida the Wound vac dressing is   removed     CHANGE how you take these medications     clonazePAM 1 mg tablet; " Commonly known as: KlonoPIN; 1 tablet (1 mg) by   g-tube route every 6 hours if needed for anxiety.; What changed: how to   take this, reasons to take this   multivitamin with minerals tablet; 1 tablet by g-tube route once daily.;   What changed: how to take this   * oxyCODONE 5 mg/5 mL solution; Commonly known as: Roxicodone; 15 mL (15   mg) by g-tube route every 4 hours if needed for moderate pain (4 - 6) or   severe pain (7 - 10). For post surgical pain. You will need to follow up   with your PCP for future refils; What changed: You were already taking a   medication with the same name, and this prescription was added. Make sure   you understand how and when to take each.   * oxyCODONE 15 mg immediate release tablet; Commonly known as:   Roxicodone; 1 tablet (15 mg) by g-tube route every 4 hours if needed   (pain). You may restart taking these pills on 2/11/2025 after Do not fill   before February 11, 2025.; Start taking on: February 11, 2025; What   changed: See the new instructions., These instructions start on February 11, 2025. If you are unsure what to do until then, ask your doctor or   other care provider.  * This list has 2 medication(s) that are the same as other medications   prescribed for you. Read the directions carefully, and ask your doctor or   other care provider to review them with you.     STOP taking these medications     HYDROmorphone 2 mg tablet; Commonly known as: Dilaudid   lidocaine 2 % solution; Commonly known as: Xylocaine   oxyCODONE-acetaminophen 7.5-325 mg tablet; Commonly known as: Percocet       Outpatient Follow-Up  Future Appointments   Date Time Provider Department Center   2/11/2025  1:45 PM MD GRETTA Anderson   2/24/2025 11:00 AM MD GRETAT Simental       Total time spent today was 50 minutes, all of which was spent coordinating patients discharge.    Ligia Gordon APRN, CNP  Certified Family Nurse Practitioner  Nurse Practitioner  III  Department of Otolaryngology: Head & Neck Surgery  Personal Pager [phone]  ENT Team  Head and Neck Phone: [phone]

## 2025-02-10 ENCOUNTER — SOCIAL WORK (OUTPATIENT)
Dept: CASE MANAGEMENT | Facility: HOSPITAL | Age: 45
End: 2025-02-10
Payer: COMMERCIAL

## 2025-02-10 NOTE — PROGRESS NOTES
"Transportation Referral      Ambulation: Independently  Pick-up Address: [address] [place] [address]  Patient Phone: [phone]   Accompaniment: Yes  Phone Receives Text Messages? Yes  Transportation Service: [employer] (p\" [phone])        Scheduled Ride(s):      Date: 02/11/2025           Appointment:  ENT   Drop off Address: [facility]: [address] [place] [address]   Time: 11:05, 10:50-11:20   Return Ride: Will call   Confirmation: [id number]     Date: 02/24/2025   Appointment: ENT                Drop off Address: [facility]: [address] [place] [id number]   Time: 8:28, 8:05-8:35  Return Ride: Will call    Confirmation: [id number]       UPDATED: SW updated trips to \"will call\" and added one passenger, resulting in [facility] resubmitting rides. This is the most accurate information on trips. Will continue to follow.   "

## 2025-02-11 ENCOUNTER — OFFICE VISIT (OUTPATIENT)
Dept: OTOLARYNGOLOGY | Facility: HOSPITAL | Age: 45
End: 2025-02-11
Payer: COMMERCIAL

## 2025-02-11 VITALS — HEIGHT: 67 IN | WEIGHT: 120 LBS | TEMPERATURE: 96.8 F | BODY MASS INDEX: 18.83 KG/M2

## 2025-02-11 DIAGNOSIS — C05.1 CANCER OF SOFT PALATE (MULTI): Primary | ICD-10-CM

## 2025-02-11 DIAGNOSIS — R13.10 DYSPHAGIA, UNSPECIFIED TYPE: ICD-10-CM

## 2025-02-11 LAB
LAB AP ASR DISCLAIMER: NORMAL
LAB AP ASR DISCLAIMER: NORMAL
LAB AP BLOCK FOR ADDITIONAL STUDIES: NORMAL
LAB AP BLOCK FOR ADDITIONAL STUDIES: NORMAL
LABORATORY COMMENT REPORT: NORMAL
LABORATORY COMMENT REPORT: NORMAL
Lab: NORMAL
Lab: NORMAL
PATH REPORT.FINAL DX SPEC: NORMAL
PATH REPORT.FINAL DX SPEC: NORMAL
PATH REPORT.GROSS SPEC: NORMAL
PATH REPORT.GROSS SPEC: NORMAL
PATH REPORT.RELEVANT HX SPEC: NORMAL
PATH REPORT.RELEVANT HX SPEC: NORMAL
PATH REPORT.TOTAL CANCER: NORMAL
PATH REPORT.TOTAL CANCER: NORMAL
PATHOLOGY SYNOPTIC REPORT: NORMAL
PATHOLOGY SYNOPTIC REPORT: NORMAL

## 2025-02-11 PROCEDURE — 3008F BODY MASS INDEX DOCD: CPT | Performed by: STUDENT IN AN ORGANIZED HEALTH CARE EDUCATION/TRAINING PROGRAM

## 2025-02-11 PROCEDURE — 1036F TOBACCO NON-USER: CPT | Performed by: STUDENT IN AN ORGANIZED HEALTH CARE EDUCATION/TRAINING PROGRAM

## 2025-02-11 PROCEDURE — 99211 OFF/OP EST MAY X REQ PHY/QHP: CPT | Performed by: STUDENT IN AN ORGANIZED HEALTH CARE EDUCATION/TRAINING PROGRAM

## 2025-02-11 RX ORDER — CHLORHEXIDINE GLUCONATE ORAL RINSE 1.2 MG/ML
15 SOLUTION DENTAL 3 TIMES DAILY
Qty: 473 ML | Refills: 3 | Status: SHIPPED | OUTPATIENT
Start: 2025-02-11

## 2025-02-11 RX ORDER — GABAPENTIN 250 MG/5ML
300 SOLUTION ORAL NIGHTLY
Qty: 100 ML | Refills: 2 | Status: SHIPPED | OUTPATIENT
Start: 2025-02-11

## 2025-02-11 ASSESSMENT — PATIENT HEALTH QUESTIONNAIRE - PHQ9
2. FEELING DOWN, DEPRESSED OR HOPELESS: NOT AT ALL
1. LITTLE INTEREST OR PLEASURE IN DOING THINGS: NOT AT ALL
SUM OF ALL RESPONSES TO PHQ9 QUESTIONS 1 & 2: 0

## 2025-02-11 ASSESSMENT — PAIN SCALES - GENERAL: PAINLEVEL_OUTOF10: 10-WORST PAIN EVER

## 2025-02-11 NOTE — PROGRESS NOTES
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 Barnard), total soft palate resection with bilateral pharyngectomy, right parasymphyseal mandibulotomy with recon bar, bilateral SND 1-4, excision of R retropharyngeal LN, recon with L RFFF STSG with Dr Ferguson and myself.   Patient hospital admission complicated by anxiety, acute blood loss anemia requiring blood transfusion.   All neck drains were removed prior to discharge.   He was decannulated prior to discharge. He has not yet taken any PO. He is tolerating his PEG tubes without concern.   On POD 6 the wound vac was removed and he was noted to have some thin tissue around the tendon. A temporary wound vac was again placed until discharge at which time the decision was made to transition to xeroform dressing changes. He was discharged with an arm drain that was inadvertently removed at home just earlier today.    He is doing well at home. Pain reasonably controlled. Does not have pain management as outpt- his PCP currently manages his pain medicine.     Exam   General- no acute distress   Head/face- bilateral facial swelling , postoperative. External incisions all intact, no fluctuance or erythema  Oral cavity- intraoral incisions intact with some granulation tissue along the right dentition. No signs of dehisence or infection. The flap reconstruction of the soft palate is mucosalizing with some area of superficial dehiscence along the right superior oropharynx that doesn't appear to track  Neck- bilateral neck incisions c/d/I without fluctuance erythema or drainage. Trach site with skin redness and irritation from excessive taping. Stoma with small persistent opening appears to be healing appropriately. Redressed with small gauze/silk tape   Arm- Left flap donor site with skin graft intact- very thin tissue over the  tendon, continue local wound care. Incision intact. Drain site open without residual suture or drainage    Patient refusing scope exam today.     A/P   44 y M with p16 negative OP SCC s/p excision of total soft palate, bilateral pharynx, mandibulotomy for access, b/l ND with R retropharyngeal node, trach/PEG, recon with L RFFF, STSG on 1/28/25 here for first postop visit    Surg Path still pending.   Wound care instructions reviewed   Has appointment scheduled and transportation coordinated for next follow up with Dr Ferguson.   As the posterior pharynx continues to heal and as there appears to have an area of pharyngeal dehisence (without fistula or infection) I am not yet comfortable starting him on PO but this may be discussed further at his next postoperative visit. He will likely need an SLP evaluation.   Refills for neurontin and peridex sent to pharmacy

## 2025-02-12 ENCOUNTER — TUMOR BOARD CONFERENCE (OUTPATIENT)
Dept: OTOLARYNGOLOGY | Facility: HOSPITAL | Age: 45
End: 2025-02-12
Payer: COMMERCIAL

## 2025-02-12 NOTE — TUMOR BOARD NOTE
[facility] HEAD AND NECK TUMOR BOARD NOTE:    Sim Guaman Is a 44 y.o. male who was presented by Dr. Ferguson at [facility] Head & Neck Tumor Board on 2/14/2025 which included representatives from all Head & Neck disciplines (Medical oncology/Radiation oncology/Otolaryngology/Radiology/Pathology).     Referring Physician: Dr. Ferguson    The [facility] Head and Neck Tumor Board considered available treatment options and made the following staging and recommendations:    Staging and Recommendations:    Site: right soft palate p16 negative Oropharyngeal SCCa  Stage: [id number], IVB  Recommendation: Radiation therapy and chemotherapy  Clinical Trial Status:   N/A        -----------------------------------------------------------------------------------------------------------------------------------------------------------------------------------------------------------------------    History and Physical in Brief:  44 y.o. 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 Ferguson and Dr. Mclaughlin on 1/28/2025. He originally presented in August 2024 for as a sore throat and odynophagia with a white lesion on his right tonsillar/soft palate area on exam.  In October 2024, he saw an ENT who biopsied the soft palate which resulted as p16 negative squamous cell carcinoma.    Imaging:  CT Neck with Contrast (12/13/2024):   - Infiltrative enhancing mass around uvula/soft palate  - Bilateral necrotic level 2 and level 3 nodes    Chest CT with Contrast (12/13/2024):   - No overt signs of lymphadenopathy, masses, or nodules    PET/CT Head and Neck:   None    Procedures to date:  -1/28/2025: DL, trach, PEG (Dr. Barnard), total soft palate resection with bilateral  pharyngectomy, right parasymphyseal mandibulotomy with recon bar, bilateral SND 1-4, excision of right retropharyngeal LN, recon instruction with left RFFF STSG     Pertinent Pathology:  -1/28/2025  Specimens:   A) - LYMPH NODE ENT (SPECIFY SITE), External jugular lymph node                                     B) - LYMPH NODE ENT (SPECIFY SITE), Right neck lymph node packet Level 1                            C) - SOFT TISSUE RESECTION, Retro pharangeal mass a skull base.                                     D) - LYMPH NODE ENT (SPECIFY SITE), Right neck lymph node packet Level 2                            E) - LYMPH NODE ENT (SPECIFY SITE), Right neck lymph node packet Level 3                            F) - LYMPH NODE ENT (SPECIFY SITE), Right neck lymph node packet Level 4                            G) - SOFT TISSUE RESECTION, Bilateral oropharyngectomy stich marks right inferior                   right tonsil                                                                                        H) - SOFT TISSUE RESECTION, Right pharangeal margin                                                 I) - SOFT TISSUE RESECTION, Right floor of mouth margin                                             J) - SOFT TISSUE RESECTION, Left glossal tonsil margin                                              K) - NECK DISSECTION LEFT (SPECIFY LEVELS), Left neck level 1                                       L) - NECK DISSECTION LEFT (SPECIFY LEVELS), Left neck level 2                                       M) - NECK DISSECTION LEFT (SPECIFY LEVELS), Left neck level 3                                       N) - NECK DISSECTION LEFT (SPECIFY LEVELS), Left neck level 4                          A.  Lymph node, external jugular, excision:  - One lymph node, negative for carcinoma (0/1).    B.  Lymph nodes, right neck level I, neck dissection:    - Six lymph nodes, negative for carcinoma (0/6).   - Submandibular gland, negative for  carcinoma.    C.  Retropharyngeal mass at skull base, excision:    - Metastatic squamous cell carcinoma involving one mostly effaced lymph node, 2.4 cm (1/1).    D.  Lymph nodes, right neck level II, neck dissection:  - Metastatic squamous cell carcinoma in one of two lymph nodes, 3.0 cm (1/2).     E.  Lymph nodes, right neck level III, neck dissection:   - Metastatic squamous cell carcinoma in one of eleven lymph nodes, 2.1 cm G: (1/11).   - Extranodal extension is present (< 0.2 cm).    F.  Lymph nodes, right neck level IV, neck dissection:   - Metastatic squamous cell carcinoma in one of fourteen lymph nodes, 0.5 cm deposit (1/14).     G.  Oropharynx, bilateral oropharyngectomy:  - Invasive keratinizing moderately differentiated squamous cell carcinoma (5.3 cm), see note and synoptic report.    - Perineural invasion is present.  - Margins negative for carcinoma, closest: Right lateral soft tissue  (0.2 cm).  - High-grade dysplasia is present at the right and right posterior mucosal margins, see note.    H.  Pharynx, right pharyngeal margin:  - Squamous mucosa with chronic inflammation and skeletal muscle, negative for high-grade dysplasia and carcinoma.    I.  Floor of mouth, right margin:  - Squamous mucosa with seromucinous glands and skeletal muscle, negative for high-grade dysplasia and carcinoma.    J.  Left glossotonsillar margin:  - Squamous mucosa with focal moderate dysplasia, best seen on permanent sections.    K.  Lymph nodes, left neck level I, neck dissection:    - Two lymph nodes, negative for carcinoma (0/2).   - Submandibular gland, negative for carcinoma.    L.  Lymph nodes, left neck level II, neck dissection:   - Metastatic squamous cell carcinoma in two of four lymph nodes, largest: 3.5 cm (2/4).     M.  Lymph nodes, left neck level III, neck dissection:   - Metastatic squamous cell carcinoma in two of seventeen lymph nodes, largest deposit: 0.3 cm (2/17).     N.  Lymph nodes, left neck level  IV, neck dissection:    - Fifteen lymph nodes, negative for carcinoma (0/15).        National site-specific guidelines were discussed with respect to the case.

## 2025-02-14 ENCOUNTER — TELEPHONE (OUTPATIENT)
Dept: OTOLARYNGOLOGY | Facility: HOSPITAL | Age: 45
End: 2025-02-14

## 2025-02-14 ENCOUNTER — APPOINTMENT (OUTPATIENT)
Dept: HEMATOLOGY/ONCOLOGY | Facility: HOSPITAL | Age: 45
End: 2025-02-14
Payer: COMMERCIAL

## 2025-02-14 NOTE — TELEPHONE ENCOUNTER
"Email message sent to me this afternoon at 2:33 p.m.:    \"Sim Guaman  Male, 44 y.o., 1980  [phone]  MRN: [id number]    Patient wife=Corinne  Left a voicemail on [employer] line requesting medical supplies be ordered   Gauze, bandage, saline solution scissors    Requesting a call back     FUV with Dr Ferguson 2-  Thanks     Senior Provider Administrative Asisstant for:    MARY Bustamante M.D. &  Elba Montejo M.D.  [employer] Ladd, MESHA Quinones\"    I reached out to our inpt discharge coordinator and was informed that enteral supplies were ordered the [employer] [phone] and the wound care supplies were ordered through [employer] [phone].    I did call [facility] and was informed that the wound care supplies were delivered to the home on 2/7/25 @ 3:31 p.m.    I called Casandra back and got her voice mail.  I left a detailed message informing her of he above.  I stated that when Nima is running low on his nutritional supplies she will need to call [employer] and for his wound care supplies she needs to call [employer].  Phone numbers for both were left in the message.  I further stated if the shipment did not arrive on 2/7/25 she needs to call [employer] as well.    "
Linear

## 2025-02-17 ENCOUNTER — TELEPHONE (OUTPATIENT)
Dept: OTOLARYNGOLOGY | Facility: HOSPITAL | Age: 45
End: 2025-02-17
Payer: COMMERCIAL

## 2025-02-17 DIAGNOSIS — C05.1 CANCER OF SOFT PALATE (MULTI): ICD-10-CM

## 2025-02-17 DIAGNOSIS — C10.9 MALIGNANT NEOPLASM OF OROPHARYNX: ICD-10-CM

## 2025-02-17 NOTE — TELEPHONE ENCOUNTER
Casandra called and left a message on Friday afternoon at 4:09 p.m. after I had left the office for the weekend.  She stated she was told by [facility] that orders were needed for the kerlix and ace wraps.     These items were ordered by our inpt team prior to discharge.  The discharge planner and our NP were out of the office today.      I called [facility] and was informed that the Kerlix and the Ace Wraps were NOT ordered as part of the wound care supplies.    In working with the rep this afternoon, I learned the following and placed the following order with them:    4 ace wraps (only 1 role per month allowed by insurance, but they will try to get 4 covered --1 per week)  30 kerlix wraps  1 role tape (only 1 covered each month by insurance)    Fax:  [phone] (order faxed)    I called Casandra and informed her of the above.  She was very appreciative and understands that she may only get 1 ace wrap if they can't get 4 approved.  She also understands that if they go through more than 1 role of tape in a month, they will have to purchase it at the local drug store.

## 2025-02-24 ENCOUNTER — OFFICE VISIT (OUTPATIENT)
Dept: OTOLARYNGOLOGY | Facility: HOSPITAL | Age: 45
End: 2025-02-24
Payer: COMMERCIAL

## 2025-02-24 VITALS — WEIGHT: 114.3 LBS | HEIGHT: 67 IN | BODY MASS INDEX: 17.94 KG/M2 | TEMPERATURE: 97.9 F

## 2025-02-24 DIAGNOSIS — C05.1 CANCER OF SOFT PALATE (MULTI): ICD-10-CM

## 2025-02-24 DIAGNOSIS — R13.10 DYSPHAGIA, UNSPECIFIED TYPE: ICD-10-CM

## 2025-02-24 DIAGNOSIS — C10.9 MALIGNANT NEOPLASM OF OROPHARYNX: ICD-10-CM

## 2025-02-24 PROCEDURE — 99211 OFF/OP EST MAY X REQ PHY/QHP: CPT | Performed by: OTOLARYNGOLOGY

## 2025-02-24 PROCEDURE — 1036F TOBACCO NON-USER: CPT | Performed by: OTOLARYNGOLOGY

## 2025-02-24 PROCEDURE — 3008F BODY MASS INDEX DOCD: CPT | Performed by: OTOLARYNGOLOGY

## 2025-02-24 ASSESSMENT — PAIN SCALES - GENERAL: PAINLEVEL_OUTOF10: 10-WORST PAIN EVER

## 2025-02-24 NOTE — LETTER
February 24, 2025     To Pimentel MD  [address]  [facility] [address]    Patient: Sim Guaman   YOB: 1980   Date of Visit: 2/24/2025       Dear Dr. To Pimentel MD:    Thank you for referring Sim Guaman to me for evaluation. Below are my notes for this consultation.  If you have questions, please do not hesitate to call me. I look forward to following your patient along with you.       Sincerely,     Susie Ferguson MD      CC: Vivienne Mclaughlin MD  ______________________________________________________________________________________    Mr. Sim Guaman is a very pleasant 44-year-old gentleman who presented with an extensive soft palate and tonsillar malignancy that was HPV negative.  He also at the time of presentation had significant bilateral mary involvement.  On January 28, 2025 he underwent a midline mandibulotomy, total palatectomy bilateral tonsillectomy partial pharyngectomy bilateral neck dissections, tracheotomy and excision of a right retropharyngeal skull base lymph node involved with malignancy.  This was reconstructed by Dr. Mclaughlin using a left radial forearm free flap reconstruction.  The patient did well postoperatively and was decannulated prior to the discharge.  He has been doing well at home without any significant complaints.    On physical examination he is a healthy-appearing gentleman in no distress.  Inspection of his left radial forearm site reveals approximately a 60% skin split-thickness skin graft take.  There is exposed tendon which is granulating well and abundant granulation tissue on the medial portion of the donor site.  Inspection of his PEG site reveals a drain dressing that has not been changed since his OR date.  This was removed and cleansed.  Otherwise the PEG site appears to be healing well without any evidence of infection.  His neck incisions are clean dry and intact.  Inspection of his oral  cavity reveals a well-healed flap with what appears to be evidence of the left tonsillar dehiscence from the flap at that site.  There is no exposed vessels or evidence of bleeding.  The sutures from his lip were removed and he otherwise appears to be doing quite well.    I discussed with Mr. Guaman and his wife the extensive nature of his tumor.  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  Due to the extranodal spread that is present it is recommended by tumor board that he undergo concurrent chemoradiation therapy.  Due to his social challenges and lack of transportation, we will arrange for him to have this performed at [facility] in [place].  In addition I have asked that he have a modified barium swallow to assess his swallowing.  He will return to see me in 4 weeks with a swallowing study that same day.

## 2025-02-24 NOTE — LETTER
February 24, 2025     Vivienne Mclaughlin MD  [address]  [facility] [address]    Patient: Sim Guaman   YOB: 1980   Date of Visit: 2/24/2025       Dear Dr. Vivienne Mclaughlin MD:    Thank you for referring Sim Guaman to me for evaluation. Below are my notes for this consultation.  If you have questions, please do not hesitate to call me. I look forward to following your patient along with you.       Sincerely,     Susie Ferguson MD      CC: No Recipients  ______________________________________________________________________________________    MrTino Guaman is a very pleasant 44-year-old gentleman who presented with an extensive soft palate and tonsillar malignancy that was HPV negative.  He also at the time of presentation had significant bilateral mary involvement.  On January 28, 2025 he underwent a midline mandibulotomy, total palatectomy bilateral tonsillectomy partial pharyngectomy bilateral neck dissections, tracheotomy and excision of a right retropharyngeal skull base lymph node involved with malignancy.  This was reconstructed by Dr. Mclaughlin using a left radial forearm free flap reconstruction.  The patient did well postoperatively and was decannulated prior to the discharge.  He has been doing well at home without any significant complaints.    On physical examination he is a healthy-appearing gentleman in no distress.  Inspection of his left radial forearm site reveals approximately a 60% skin split-thickness skin graft take.  There is exposed tendon which is granulating well and abundant granulation tissue on the medial portion of the donor site.  Inspection of his PEG site reveals a drain dressing that has not been changed since his OR date.  This was removed and cleansed.  Otherwise the PEG site appears to be healing well without any evidence of infection.  His neck incisions are clean dry and intact.  Inspection of his oral cavity reveals a well-healed flap  with what appears to be evidence of the left tonsillar dehiscence from the flap at that site.  There is no exposed vessels or evidence of bleeding.  The sutures from his lip were removed and he otherwise appears to be doing quite well.    I discussed with Mr. Guaman and his wife the extensive nature of his tumor.  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  Due to the extranodal spread that is present it is recommended by tumor board that he undergo concurrent chemoradiation therapy.  Due to his social challenges and lack of transportation, we will arrange for him to have this performed at [facility] in [place].  In addition I have asked that he have a modified barium swallow to assess his swallowing.  He will return to see me in 4 weeks with a swallowing study that same day.

## 2025-02-24 NOTE — PROGRESS NOTES
Mr. Sim Guaman is a very pleasant 44-year-old gentleman who presented with an extensive soft palate and tonsillar malignancy that was HPV negative.  He also at the time of presentation had significant bilateral mary involvement.  On January 28, 2025 he underwent a midline mandibulotomy, total palatectomy bilateral tonsillectomy partial pharyngectomy bilateral neck dissections, tracheotomy and excision of a right retropharyngeal skull base lymph node involved with malignancy.  This was reconstructed by Dr. Mclaughlin using a left radial forearm free flap reconstruction.  The patient did well postoperatively and was decannulated prior to the discharge.  He has been doing well at home without any significant complaints.    On physical examination he is a healthy-appearing gentleman in no distress.  Inspection of his left radial forearm site reveals approximately a 60% skin split-thickness skin graft take.  There is exposed tendon which is granulating well and abundant granulation tissue on the medial portion of the donor site.  Inspection of his PEG site reveals a drain dressing that has not been changed since his OR date.  This was removed and cleansed.  Otherwise the PEG site appears to be healing well without any evidence of infection.  His neck incisions are clean dry and intact.  Inspection of his oral cavity reveals a well-healed flap with what appears to be evidence of the left tonsillar dehiscence from the flap at that site.  There is no exposed vessels or evidence of bleeding.  The sutures from his lip were removed and he otherwise appears to be doing quite well.    I discussed with Mr. Guaman and his wife the extensive nature of his tumor.  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  Due to the extranodal spread that is present it is recommended by tumor board that he undergo concurrent chemoradiation therapy.  Due to his social challenges and lack of transportation, we  will arrange for him to have this performed at [facility] in [place].  In addition I have asked that he have a modified barium swallow to assess his swallowing.  He will return to see me in 4 weeks with a swallowing study that same day.

## 2025-02-26 ENCOUNTER — SOCIAL WORK (OUTPATIENT)
Dept: CASE MANAGEMENT | Facility: HOSPITAL | Age: 45
End: 2025-02-26
Payer: COMMERCIAL

## 2025-02-26 NOTE — PROGRESS NOTES
"Transportation Referral     Referral Source: MIGUELINA Ramirez   Multiple Rides Needed? No  Pick-up Address: [address] [place] [address]   Patient Phone: [phone]  Accompaniment: Yes,   Ambulation?: Independently  Phone Receives Text Messages? Yes  Transportation Service: [employer] (p\" [phone])      Scheduled Ride(s):     Date:  3/24/2025  Appointment:  SLP, ENT   Drop off Address: [facility] SCC: [address] [place] [address]   Time:  6:55-7:25  Return Ride:  Will call   Confirmation: [id number]    "

## 2025-02-27 NOTE — DOCUMENTATION CLARIFICATION NOTE
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    Question: Based on your assessment of the patient and the pathology findings, can the pathology findings be confirmed by providing the requested documentation to include if specimen is benign or malignant, primary or secondary and any metastatic sites.  Please include diagnosis of disease    When answering this query, please exercise your independent professional judgment. The fact that a question is being asked, does not imply that any particular answer is desired or expected.    The patient's clinical indicators include:  Clinical Information:  34 y/o M with oropharyngeal SCC admitted for surgery.      Surgical Pathology from 25 resulted as -  \"C.  Retropharyngeal mass at skull base, excision:  - Metastatic squamous cell carcinoma involving one mostly effaced lymph node, 2.4 cm ().    D.  Lymph nodes, right neck level II, neck dissection:  - Metastatic squamous cell carcinoma in one of two lymph nodes, 3.0 cm ().    E.  Lymph nodes, right neck level III, neck dissection:  - Metastatic squamous cell carcinoma in one of eleven lymph nodes, 2.1 cm G: ().  - Extranodal extension is present (< 0.2 cm).    F.  Lymph nodes, right neck level IV, neck dissection:  - Metastatic squamous cell carcinoma in one of fourteen lymph nodes, 0.5 " "cm deposit (1/14).    G.  Oropharynx, bilateral oropharyngectomy:  - Invasive keratinizing moderately differentiated squamous cell carcinoma (5.3 cm), see note and synoptic report.  - Perineural invasion is present.  - Margins negative for carcinoma, closest: Right lateral soft tissue  (0.2 cm).  - High-grade dysplasia is present at the right and right posterior mucosal margins, see note.\"    \"L.  Lymph nodes, left neck level II, neck dissection:  - Metastatic squamous cell carcinoma in two of four lymph nodes, largest: 3.5 cm (2/4).    M.  Lymph nodes, left neck level III, neck dissection:  - Metastatic squamous cell carcinoma in two of seventeen lymph nodes, largest deposit: 0.3 cm (2/17).\"      Clinical Indicators:  The ENT Progress Notes:  \"...diagnosis of Oropharyngeal SCC with known neck metastasis...\"  \"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  Cervical Lymph Node Mets\"  Options provided:  -- SCC of the oropharynx with metastasis to the cervical lymph nodes, extranodal extension, and perineural invasion  -- Pathology findings, Please specify additional information below  -- Other - I will add my own diagnosis  -- Refer to Clinical Documentation Reviewer    Query created by: Chelle Unger on 2/24/2025 11:36 AM      Electronically signed by:  ELIECER KAT-CNP 2/27/2025 8:52 AM          "

## 2025-03-24 ENCOUNTER — OFFICE VISIT (OUTPATIENT)
Dept: OTOLARYNGOLOGY | Facility: HOSPITAL | Age: 45
End: 2025-03-24
Payer: COMMERCIAL

## 2025-03-24 ENCOUNTER — HOSPITAL ENCOUNTER (OUTPATIENT)
Dept: RADIOLOGY | Facility: HOSPITAL | Age: 45
Discharge: HOME | End: 2025-03-24
Payer: COMMERCIAL

## 2025-03-24 VITALS — BODY MASS INDEX: 18.83 KG/M2 | HEIGHT: 67 IN | WEIGHT: 120 LBS | TEMPERATURE: 96.8 F

## 2025-03-24 DIAGNOSIS — C05.1 CANCER OF SOFT PALATE (MULTI): Primary | ICD-10-CM

## 2025-03-24 DIAGNOSIS — R13.10 DYSPHAGIA, UNSPECIFIED TYPE: ICD-10-CM

## 2025-03-24 DIAGNOSIS — C05.1 CANCER OF SOFT PALATE (MULTI): ICD-10-CM

## 2025-03-24 PROCEDURE — 74230 X-RAY XM SWLNG FUNCJ C+: CPT | Performed by: RADIOLOGY

## 2025-03-24 PROCEDURE — 3008F BODY MASS INDEX DOCD: CPT | Performed by: OTOLARYNGOLOGY

## 2025-03-24 PROCEDURE — 99214 OFFICE O/P EST MOD 30 MIN: CPT | Performed by: OTOLARYNGOLOGY

## 2025-03-24 PROCEDURE — 99024 POSTOP FOLLOW-UP VISIT: CPT | Performed by: OTOLARYNGOLOGY

## 2025-03-24 PROCEDURE — 1036F TOBACCO NON-USER: CPT | Performed by: OTOLARYNGOLOGY

## 2025-03-24 PROCEDURE — 74230 X-RAY XM SWLNG FUNCJ C+: CPT

## 2025-03-24 PROCEDURE — 2500000005 HC RX 250 GENERAL PHARMACY W/O HCPCS: Mod: SE | Performed by: OTOLARYNGOLOGY

## 2025-03-24 RX ADMIN — BARIUM SULFATE 700 MG: 700 TABLET ORAL at 11:36

## 2025-03-24 RX ADMIN — BARIUM SULFATE 10 ML: 400 PASTE ORAL at 11:35

## 2025-03-24 RX ADMIN — BARIUM SULFATE 40 ML: 0.81 POWDER, FOR SUSPENSION ORAL at 11:36

## 2025-03-24 RX ADMIN — BARIUM SULFATE 40 ML: 400 SUSPENSION ORAL at 11:36

## 2025-03-24 ASSESSMENT — PATIENT HEALTH QUESTIONNAIRE - PHQ9
SUM OF ALL RESPONSES TO PHQ9 QUESTIONS 1 & 2: 0
2. FEELING DOWN, DEPRESSED OR HOPELESS: NOT AT ALL
1. LITTLE INTEREST OR PLEASURE IN DOING THINGS: NOT AT ALL

## 2025-03-24 ASSESSMENT — PAIN SCALES - GENERAL: PAINLEVEL_OUTOF10: 10-WORST PAIN EVER

## 2025-03-24 NOTE — PROCEDURES
Speech-Language Pathology    Outpatient Modified Barium Swallow Study    Patient Name: Sim Guaman  MRN: [id number]  : 1980  Today's Date: 25  Time Calculation  Start Time: 1000  Stop Time: 1045  Time Calculation (min): 45 min      Modified Barium Swallow Study completed. Informed verbal consent obtained prior to completion of exam. The study was completed per protocol with various liquid barium consistencies, pudding, solids and a 13mm barium tablet.  A 1.9 cm or .75 inch (outer diameter) ring was placed on the chin in the lateral view and on the lateral, left side of the neck in the a-p view in order to complete objective measurements during swallowing. The anatomic structures and function of the oropharynx, larynx, hypopharynx and cervical esophagus were evaluated.    SLP: Omar Salas SLP   Contact info: [place]; phone: [phone]      Reason for Referral: Dysphagia s/p multimodality treatment for head and neck cancer. Pt currently PEG dependent with plans to began chemo/rxt soon.     PMH: Sim Guaman with extensive soft palate and tonsillar malignancy. On 2025 he underwent a midline mandibulotomy, total palatectomy bilateral tonsillectomy partial pharyngectomy bilateral neck dissections, tracheotomy and excision of a right retropharyngeal skull base lymph node involved with malignancy. Dr. Mclaughlin using a left radial forearm free flap reconstruction. The patient did well postoperatively and was decannulated prior to the discharge.     Respiratory Status: Room air  Current diet: PEG tube with small sips of water.    Pain:  Pain Scale: 0-10  Ratin    DIET RECOMMENDATIONS:   - Thin liquids (IDDSI Level 0)  - Mildly/nectar thick liquids (IDDSI Level 2)  With the following swallow strategies listed below:    STRATEGIES:  - Small bites  - Small, single sips  - Effortful swallow  - Swallow 2-3 times per bite/sip  -Left head tilt  - No  sekou      SLP PLAN:  Skilled SLP Services: Skilled SLP intervention for dysphagia is warranted.  SLP Frequency: To be determined by treating SLP in outpatient setting  Duration:  Treatment/Interventions:   - Oropharyngeal exercises  - Compensatory strategy training  - Diet tolerance/advancement  - Patient/caregiver education    Discussed POC: Patient  Discussed Risks/Benefits: Yes  Patient/Caregiver Agreeable: Yes    Additional Medical Consults Suggested:   - No new disciplines indicated    Additional Notes for Physician: Protocol followed?    Short term goals established 03/24/25:   - Pt will complete effortful swallows 10 reps, 3 times per day for 7 days a week; to strengthen pharyngeal muscles for improve bolus clearance through the pharynx.    - Pt will utilize and recall compensatory strategies independent of cues 100% of the time to tolerate the least restrictive diet without overt s/s of pharyngeal deficits.  - Pt will produce a series of lingual exercises/stretches independent of cues 10 reps, 3 times per day, 7 days a week; to maintain strength and motility of the lingual muscles and reduce the effects of radiation for safe and efficient PO intake.  - Pt will complete a series of jaw exercises/stretches independent of cues 3x a day, 7 days a week; to reduced the effects of radiation on the jaw muscles necessary to consume PO safely and efficiently.     Long term goals 03/24/25:   Patient will tolerate the least restrictive diet without overt difficulty or further pulmonary compromise by time of discharge.      Education Provided: Results and recommendations per MBSS, with video review; recommendations and POC at this time. Verbal understanding and agreement given on all accounts.     Treatment Provided Today: ST provided extensive education and training to pt/pt family regarding anatomy/physiology of swallow function, risk factors of aspiration/aspiration pna & how to mitigate factors, diet modifications,  and the use of compensatory swallow strategies to promote pt safety upon PO intake including left head tilt. In addition, ST provided instruction/training on oropharyngeal exercises (re: effortful swallow).     Repeat Study: Per MD or treating SLP       Mechanics of the Swallow Summary:  ORAL PHASE:  Lip Closure - Escape from interlabial space or lateral juncture/no extension beyond vermillion border   Tongue Control During Bolus Hold - Posterior escape of greater than half of the bolus   Bolus prep/mastication - Mastication not assessed   Bolus transport/lingual motion - Repetitive/disorganized tongue motion (tongue pumping)   Oral residue - Residue collection on oral structure     PHARYNGEAL PHASE:  Initiation of pharyngeal swallow - Bolus head at pit of pyriforms   Soft palate elevation - not present/ is a now a flap.  Laryngeal elevation - Partial superior movement of thyroid cartilage and/or partial approximation of arytenoids to epiglottic petiole   Anterior hyoid excursion - No anterior movement   Epiglottic movement - Partial inversion  Laryngeal vestibule closure - Incomplete - narrow column of air/contrast in laryngeal vestibule   Pharyngeal stripping wave - Present, however, diminished   Pharyngeal contraction (A/P view) -  Bilateral bulging   Pharyngoesophageal segment opening - Minimal distension/incomplete duration with marked obstruction of flow of bolus   Tongue base retraction - Narrow column of contrast or air between tongue base and pharyngeal wall   Pharyngeal residue - Majority of contrast within or on the pharyngeal structures     ESOPHAGEAL PHASE:  Esophageal clearance - Complete clearance       SLP Impressions with Severity Rating:   Moderate oropharyngeal dysphagia upon completion of modified barium swallow study this date. Swallowing physiology is detailed above. Impairments most impacting swallowing safety and efficiency include reduced lip/oral manipulation, reduced posterior pharyngeal  wall constriction, absent anterior hyoid excursion, reduced laryngeal vestibular closure and reduced PES distention.     Airway: Patient demonstrated trace-min amounts of silent and overt aspiration during and after the swallow with all consistencies presented. Cued cough inconsistently productive to clear pen/asp materials. Pt at risk for more amounts of aspiration after the swallow with thicker viscosities and purees given amounts of post swallow residues.     Efficiency: Reduced oral manipulation and anterior oral spillage with thin liquids. Head tilt to the left with straw placement on the left to improve labial seal and manipulation. Post swallow residues increased with thicker viscosities and purees; resulting in increased opportunities for post swallow aspiration. Upon ap view efficient clearance of the esophagus during esophageal sweep. The A-P bolus follow-through is not intended to be utilized as a diagnostic assessment of the esophagus, rather a tool to observe the biomechanical aspects of the swallow continuum and to inform the need for further evaluation by medical specialists, as applicable.     Strategies attempted- left head tilt      OUTCOME MEASURES:  Functional Oral Intake Scale  Functional Oral Intake Scale: Level 1        nothing by mouth       Eating Assessment Tool (EAT-10) Pt PEG dependent and was not able to complete due to absent glasses.     Rosenbek's Penetration Aspiration Scale  Thin Liquids: 8. SILENT ASPIRATION - contrast passes glottis, visible residue, NO pt response]   During the Swallow  After the Swallow: Only on consecutive sips via straw in trace amounts.  Nectar Thick Liquids: 8. SILENT ASPIRATION - contrast passes glottis, visible residue, NO pt response]   After the Swallow: Min amounts of aspiration on post swallow residues. Cued cough inconsistently productive.  Puree: 8. SILENT ASPIRATION - contrast passes glottis, visible residue, NO pt response]   During the  Swallow  After the Swallow- Min with high risk of mod amounts of aspiration on given amounts of post swallow residues  Soft Solids: n/a

## 2025-03-24 NOTE — PROGRESS NOTES
Mr. Sim Guaman is a very pleasant 44-year-old gentleman who presented with an extensive soft palate and tonsillar malignancy that was HPV negative.  He also at the time of presentation had significant bilateral mary involvement.  On January 28, 2025 he underwent a midline mandibulotomy, total palatectomy bilateral tonsillectomy partial pharyngectomy bilateral neck dissections, tracheotomy and excision of a right retropharyngeal skull base lymph node involved with malignancy.  This was reconstructed by Dr. Mclaughlin using a left radial forearm free flap reconstruction.  The patient did well postoperatively and was decannulated prior to the discharge.  He has been doing well at home without any significant complaints. He has passed his modified barium swallow and is beginning to take an oral diet. He continues to use his PEG as necessary.      On physical examination he is a healthy-appearing gentleman in no distress.  Inspection of his left radial forearm site reveals approximately a 90% skin split-thickness skin graft take.  There is exposed tendon which is granulating well and abundant granulation tissue on the medial portion of the donor site.  Inspection of his PEG site reveals a small area of granulation tissue that was cauterized today. Otherwise the PEG site appears to be healing well without any evidence of infection.  His neck incisions are clean dry and intact.  Inspection of his oral cavity reveals a well-healed flap. The remainder of his head and neck exam is within normal limits.      I discussed with Mr. Guaman and his wife the extensive nature of his tumor.  He is a stage T3 N3b M0 (stage IVb) squamous cell carcinoma that is HPV negative.  Due to the extranodal spread that is present it is recommended by tumor board that he undergo concurrent chemoradiation therapy.  This has been arranged for him close to home and he will begin therapy in the next week or so.  He has undergone simulation and is  awaiting dental clearance. He will return to see me in 10 weeks, following the completion of his chemoradiation therapy.

## 2025-04-07 LAB — SCAN RESULT: NORMAL

## 2025-05-29 ENCOUNTER — SOCIAL WORK (OUTPATIENT)
Dept: CASE MANAGEMENT | Facility: HOSPITAL | Age: 45
End: 2025-05-29
Payer: COMMERCIAL

## 2025-05-29 NOTE — PROGRESS NOTES
"Transportation Referral     Ambulation: Independently  Pick-up Address: [address] [facility] [address]  Patient Phone: [phone]  Accompaniment: Yes,   Ambulation?: Independently  Phone Receives Text Messages? Yes  Transportation Service: [employer] (p\" [phone])      Scheduled Ride(s):     Date:  6/9/2025  Appointment:  ENT Onc   Drop off Address: [facility]: [address] [place] [address]   Time:  8:05, but can be between 7:50-8:10  Return Ride:  Will call   Confirmation: [id number]    "

## 2025-06-09 ENCOUNTER — OFFICE VISIT (OUTPATIENT)
Dept: OTOLARYNGOLOGY | Facility: HOSPITAL | Age: 45
End: 2025-06-09
Payer: COMMERCIAL

## 2025-06-09 VITALS — WEIGHT: 115.6 LBS | BODY MASS INDEX: 18.11 KG/M2

## 2025-06-09 DIAGNOSIS — C10.9 MALIGNANT NEOPLASM OF OROPHARYNX: ICD-10-CM

## 2025-06-09 DIAGNOSIS — R13.10 DYSPHAGIA, UNSPECIFIED TYPE: ICD-10-CM

## 2025-06-09 PROCEDURE — 1036F TOBACCO NON-USER: CPT | Performed by: OTOLARYNGOLOGY

## 2025-06-09 PROCEDURE — 99214 OFFICE O/P EST MOD 30 MIN: CPT | Mod: 25 | Performed by: OTOLARYNGOLOGY

## 2025-06-09 PROCEDURE — 31575 DIAGNOSTIC LARYNGOSCOPY: CPT | Performed by: OTOLARYNGOLOGY

## 2025-06-09 PROCEDURE — 99214 OFFICE O/P EST MOD 30 MIN: CPT | Performed by: OTOLARYNGOLOGY

## 2025-06-09 RX ORDER — OXYCODONE HYDROCHLORIDE 20 MG/1
1 TABLET ORAL
COMMUNITY
Start: 2025-06-05

## 2025-06-09 ASSESSMENT — PATIENT HEALTH QUESTIONNAIRE - PHQ9
SUM OF ALL RESPONSES TO PHQ9 QUESTIONS 1 AND 2: 0
2. FEELING DOWN, DEPRESSED OR HOPELESS: NOT AT ALL
1. LITTLE INTEREST OR PLEASURE IN DOING THINGS: NOT AT ALL

## 2025-06-09 NOTE — Clinical Note
June 9, 2025     No Recipients    Patient: Sim Guaman   YOB: 1980   Date of Visit: 6/9/2025       Dear Dr. Ny Recipients:    Thank you for referring Sim Guaman to me for evaluation. Below are my notes for this consultation.  If you have questions, please do not hesitate to call me. I look forward to following your patient along with you.       Sincerely,     Susie Ferguson MD      CC: No Recipients  ______________________________________________________________________________________    Mr. Guaman returns today for his first visit following the conclusion of his concurrent chemoradiation therapy.  On January 28, 2025 he underwent a a total soft palatectomy right pharyngectomy, bilateral neck dissections tracheotomy, and a radial forearm free flap reconstruction.  He did remarkably well following that procedure and was discharged from the hospital on postoperative day #7.  He then completed his concurrent chemoradiation therapy at the end of May and is here for his first postoperative visit.  He had a post treatment speech and swallowing evaluation in which he was able to take foods of all consistencies without difficulty.  He was still using his PEG tube for supplementation.  However since the completion of his chemo and radiation therapy he feels that its much more difficult to swallow foods of solid consistency.  He states that it feels like it stuck in the middle of his throat.  He denies any nasal regurgitation of liquids.  He is able to drink water but does feel it hangs up in the middle of his throat.  He denies any aspiration symptoms.    On physical examination his neck is well-healed without any adenopathy or masses.  Inspection of his oral cavity reveals a well-healed flap with a left-sided nasal port which closes appropriately and prevents nasal regurgitation.  There is no evidence of recurrent disease in the periphery of the flap margins.    Procedure: Flexible  fiberoptic laryngoscopy    After adequate topical anesthesia the patient's right nares was used as the entry point for the flexible scope and the entire upper aerodigestive tract was examined.  He has good closure of his nasopharyngeal port following this reconstruction.  Inspection of his oral cavity oropharynx and hypopharynx reveals no evidence of pooling of secretions or obvious stenosis.  The scope was then removed without difficulty.    Overall, Mr. Guaman is doing well following the completion of his concurrent chemoradiation therapy.  He will be receiving a posttreatment PET CT scan in September to confirm sufficient treatment of his primary malignancy.  More immediately, due to his swallowing difficulties he will undergo a modified barium swallow to determine the site of his dysphagia and if there is an area of stenosis secondary to his treatment.  I will see him after this to discuss most appropriate treatment options.

## 2025-06-09 NOTE — PROGRESS NOTES
Mr. Guaman returns today for his first visit following the conclusion of his concurrent chemoradiation therapy.  On January 28, 2025 he underwent a a total soft palatectomy right pharyngectomy, bilateral neck dissections tracheotomy, and a radial forearm free flap reconstruction.  He did remarkably well following that procedure and was discharged from the hospital on postoperative day #7.  He then completed his concurrent chemoradiation therapy at the end of May and is here for his first postoperative visit.  He had a post treatment speech and swallowing evaluation in which he was able to take foods of all consistencies without difficulty.  He was still using his PEG tube for supplementation.  However since the completion of his chemo and radiation therapy he feels that its much more difficult to swallow foods of solid consistency.  He states that it feels like it stuck in the middle of his throat.  He denies any nasal regurgitation of liquids.  He is able to drink water but does feel it hangs up in the middle of his throat.  He denies any aspiration symptoms.    On physical examination his neck is well-healed without any adenopathy or masses.  Inspection of his oral cavity reveals a well-healed flap with a left-sided nasal port which closes appropriately and prevents nasal regurgitation.  There is no evidence of recurrent disease in the periphery of the flap margins.    Procedure: Flexible fiberoptic laryngoscopy    After adequate topical anesthesia the patient's right nares was used as the entry point for the flexible scope and the entire upper aerodigestive tract was examined.  He has good closure of his nasopharyngeal port following this reconstruction.  Inspection of his oral cavity oropharynx and hypopharynx reveals no evidence of pooling of secretions or obvious stenosis.  The scope was then removed without difficulty.    Overall, Mr. Guaman is doing well following the completion of his concurrent chemoradiation  therapy.  He will be receiving a posttreatment PET CT scan in September to confirm sufficient treatment of his primary malignancy.  More immediately, due to his swallowing difficulties he will undergo a modified barium swallow to determine the site of his dysphagia and if there is an area of stenosis secondary to his treatment.  I will see him after this to discuss most appropriate treatment options.

## 2025-06-17 ENCOUNTER — SOCIAL WORK (OUTPATIENT)
Dept: CASE MANAGEMENT | Facility: HOSPITAL | Age: 45
End: 2025-06-17
Payer: COMMERCIAL

## 2025-06-17 NOTE — PROGRESS NOTES
Transportation Referral     Ambulation: Independently  Pick-up Address: [address] [place] [address]  Patient Phone: [phone]  Accompaniment: Yes,   Ambulation?: Independently  Phone Receives Text Messages? Yes  Transportation Service: [employer] ([phone])      Scheduled Ride(s):     Date:  7/1/2025  Appointment:  SLP   Drop off Address: [facility] SCC: [address] [place] [address]   Time:  6:35, but can be between 6:20-6:50  Return Ride:  Will call   Confirmation: [id number]    SW texted details of trip to Nima' wife Casandra.

## 2025-07-01 ENCOUNTER — APPOINTMENT (OUTPATIENT)
Dept: RADIOLOGY | Facility: HOSPITAL | Age: 45
End: 2025-07-01
Payer: COMMERCIAL

## 2025-07-01 ENCOUNTER — TELEPHONE (OUTPATIENT)
Dept: OTOLARYNGOLOGY | Facility: HOSPITAL | Age: 45
End: 2025-07-01
Payer: COMMERCIAL

## 2025-07-01 ENCOUNTER — SOCIAL WORK (OUTPATIENT)
Dept: CASE MANAGEMENT | Facility: HOSPITAL | Age: 45
End: 2025-07-01
Payer: COMMERCIAL

## 2025-07-01 NOTE — TELEPHONE ENCOUNTER
Mrs. Guaman called and left me message yesterday at 11:10 a.m. and again at 2:10 p.m.  Both stating that Nima will not make his swallow testing today (Tuesday, July 1st).  She stated Nima injured his knee.  Mrs. Guaman asked if we could coordinate the test for the same day his his apt with Dr. Ferguson on 7/28/25.  This will help her with her work schedule.    I sent message yesterday to see about getting the MBS rescheduled.  No one got back to me until today.    We have Nima rescheduled for 7/28/25 @ 10:15 a.m. for the MBS.      Mrs. Guaman called me again this morning about the apt, but the message was left before arrangements got finalized.    I called her back and informed her of the arrangements and that DEANDRE Slaughter was already aware of the change.  Mrs. Guaman was appreciative and stated Nima was sorry that he couldn't make the apt today.    She knows that they will need to arrive at 10 a.m. for the 10:15 a.m. MBS on 7/28/25.  They need to go to radiology.  After they are done with the testing, they need to come to the [facility] to see Dr. Ferguson.

## 2025-07-01 NOTE — PROGRESS NOTES
Transportation Referral     Ambulation: Independently  Pick-up Address: [address] [place] [address]  Patient Phone: [phone]  Accompaniment: Yes,   Ambulation?: Independently  Phone Receives Text Messages? Yes  Transportation Service: [employer] ([phone])      Scheduled Ride(s):     Date:  7/28/2025  Appointment:  [facility], ENT   Drop off Address: [facility] SCC: [address] [place] [address]   Time:  0700-0730  Return Ride:  Will call   Confirmation: [id number]    SHASHI sent information to Nima' wife, Casandra. SHASHI will continue to follow.

## 2025-07-03 ENCOUNTER — TELEPHONE (OUTPATIENT)
Dept: OTOLARYNGOLOGY | Facility: HOSPITAL | Age: 45
End: 2025-07-03
Payer: COMMERCIAL

## 2025-07-16 ENCOUNTER — SOCIAL WORK (OUTPATIENT)
Dept: CASE MANAGEMENT | Facility: HOSPITAL | Age: 45
End: 2025-07-16
Payer: COMMERCIAL

## 2025-07-16 NOTE — PROGRESS NOTES
Transportation Referral     Ambulation: Independently  Pick-up Address: [address] [place] [address]  Patient Phone: [phone]  Accompaniment: Yes,   Ambulation?: Independently  Phone Receives Text Messages? Yes  Transportation Service: [employer] ([phone])      Scheduled Ride(s):     Date:  8/1/2025  Appointment:  ENT,   Drop off Address: [facility] [place]: [address] [place] [address]   Time: 0705, but can be between 2538-6570  Return Ride:  Will call   Confirmation: [id number]      SW additionally cancelled previously set transport for 7/28. Details texted from work cell phone to Nima's wife, Casandra.

## 2025-07-28 ENCOUNTER — APPOINTMENT (OUTPATIENT)
Dept: RADIOLOGY | Facility: HOSPITAL | Age: 45
End: 2025-07-28
Payer: COMMERCIAL

## 2025-07-28 ENCOUNTER — APPOINTMENT (OUTPATIENT)
Dept: OTOLARYNGOLOGY | Facility: HOSPITAL | Age: 45
End: 2025-07-28
Payer: COMMERCIAL

## 2025-08-01 ENCOUNTER — HOSPITAL ENCOUNTER (OUTPATIENT)
Dept: RADIOLOGY | Facility: HOSPITAL | Age: 45
Discharge: HOME | End: 2025-08-01
Payer: COMMERCIAL

## 2025-08-01 ENCOUNTER — OFFICE VISIT (OUTPATIENT)
Dept: OTOLARYNGOLOGY | Facility: HOSPITAL | Age: 45
End: 2025-08-01
Payer: COMMERCIAL

## 2025-08-01 VITALS — TEMPERATURE: 99.5 F | BODY MASS INDEX: 16.32 KG/M2 | WEIGHT: 104 LBS | HEIGHT: 67 IN

## 2025-08-01 DIAGNOSIS — C05.1 CANCER OF SOFT PALATE (MULTI): Primary | ICD-10-CM

## 2025-08-01 DIAGNOSIS — R13.10 DYSPHAGIA, UNSPECIFIED TYPE: ICD-10-CM

## 2025-08-01 DIAGNOSIS — C10.9 MALIGNANT NEOPLASM OF OROPHARYNX: ICD-10-CM

## 2025-08-01 PROCEDURE — 99214 OFFICE O/P EST MOD 30 MIN: CPT | Performed by: OTOLARYNGOLOGY

## 2025-08-01 PROCEDURE — 74230 X-RAY XM SWLNG FUNCJ C+: CPT

## 2025-08-01 PROCEDURE — 2500000005 HC RX 250 GENERAL PHARMACY W/O HCPCS: Mod: SE | Performed by: OTOLARYNGOLOGY

## 2025-08-01 PROCEDURE — 92611 MOTION FLUOROSCOPY/SWALLOW: CPT | Mod: GN

## 2025-08-01 PROCEDURE — 3008F BODY MASS INDEX DOCD: CPT | Performed by: OTOLARYNGOLOGY

## 2025-08-01 RX ADMIN — BARIUM SULFATE 40 ML: 400 SUSPENSION ORAL at 12:24

## 2025-08-01 RX ADMIN — BARIUM SULFATE 40 ML: 0.81 POWDER, FOR SUSPENSION ORAL at 12:25

## 2025-08-01 RX ADMIN — BARIUM SULFATE 20 ML: 400 PASTE ORAL at 12:24

## 2025-08-01 ASSESSMENT — ENCOUNTER SYMPTOMS
LOSS OF SENSATION IN FEET: 0
DEPRESSION: 0
OCCASIONAL FEELINGS OF UNSTEADINESS: 1

## 2025-08-01 ASSESSMENT — PAIN SCALES - GENERAL: PAINLEVEL_OUTOF10: 10-WORST PAIN EVER

## 2025-08-01 NOTE — PROCEDURES
Speech-Language Pathology    Speech-Language Pathology    Outpatient Modified Barium Swallow Study    Patient Name: Sim Guaman  MRN: [id number]  : 1980  Today's Date: 25  Time Calculation  Start Time: 930  Stop Time: 1015  Time Calculation (min): 45 min        Modified Barium Swallow Study completed. Informed verbal consent obtained prior to completion of exam. The study was completed per protocol with various liquid barium consistencies, pudding and solids.  A 1.9 cm or .75 inch (outer diameter) ring was placed on the chin in the lateral view and on the lateral and oblique view in order to complete objective measurements during swallowing. The anatomic structures and function of the oropharynx, larynx, hypopharynx and cervical esophagus were evaluated.    SLP: NARGIS Brito   Contact info: zlien; phone: [phone]      Reason for Referral: Dysphagia s/p multimodality treatment for head and neck cancer. Pt currently PEG dependent with recent completion of to chemo/rads 1 month ago.   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 On 2025 he underwent a midline mandibulotomy, total palatectomy bilateral tonsillectomy partial pharyngectomy bilateral neck dissections, tracheotomy and excision of a right retropharyngeal skull base lymph node involved with malignancy. Dr. Mclaughlin using a left radial forearm free flap reconstruction. The patient did well postoperatively and was decannulated prior to the discharge.   Respiratory Status: Room air  Current diet: PEG tube with sips of clear liquids.    Pain:  Pain Scale: 0-10  Ratin    DIET RECOMMENDATIONS:   - Continue PEG tube for all nutrition, hydration and medications  - Pleasure feeds of mildly thick liquids or naturally nectar thick liquids.  - Therapeutic trials of purees with OP SLP.     With the following swallow strategies listed below:    STRATEGIES:  - Effortful  swallow  - Chin tuck  - Multiple swallows    SLP Impressions with Severity Rating:   Severe oropharyngeal dysphagia upon completion of modified barium swallow study this date. Swallowing physiology is detailed in MBSImP scale below. Impairments most impacting swallowing safety and efficiency include reduced ap propulsion, limited BOT retraction, poor posterior pharyngeal wall constriction, delayed and reduced laryngeal vestibular closure with diminished PES distention. Given these multiple deficits pt demonstrated consistent deep penetration, silent and overt aspiration; before, during and after the swallow across all consistencies.  Aspiration was of moderate to trace amounts. Pt with inconsistent productive reflexive cough response or cued cough response. During the swallow, the majority of all boluses average 50-75% or 1/2 to 3/4 of the each bolus remained in the vallecula space with extension to the BOT and oral cavity. During cued and reflexive consecutive dry swallows all residues began to mix with secretions and pool in the laryngeal vestibule; resulting in repeated min to trace amounts of aspiration after the swallow. Despite these multiple dry swallows, pt unable to completely clear pharynx of pharyngeal stasis of all consistencies.   Strategies attempted- Chin tuck was effective to reduce amounts of aspiration and pharyngeal stasis but not completely eliminate them. Aspiration continued to occur but in much less amounts (trace amounts).     SLP PLAN:  Skilled SLP Services: Skilled SLP intervention for dysphagia is warranted.  SLP Frequency: To be determined by treating SLP in outpatient setting  Duration:  Treatment/Interventions:   - Oropharyngeal exercises  - Bolus trials  - Compensatory strategy training  - Patient/caregiver education    Discussed POC: Patient  Discussed Risks/Benefits: Yes  Patient/Caregiver Agreeable: Yes    Additional Medical Consults Suggested:   - No new disciplines  indicated    Additional Notes for Physician: Protocol followed?    Short term goals established 08/01/25:   - Pt will complete effortful swallows 10 reps, 3 times per day for 7 days a week with purees; to strengthen pharyngeal muscles for improve bolus clearance through the pharynx.    - Pt will utilize and recall compensatory strategies independent of cues 100% of the time to tolerate the least restrictive diet without overt s/s of pharyngeal deficits.    Long term goals 08/01/25:   - Pt will demonstrate improved swallow function as measured by improved score on the FIOS and EAT-10 by time of discharge.         Education Provided: Results and recommendations per MBSS, with video review; recommendations and POC at this time. Verbal understanding and agreement given on all accounts.   ST provided extensive education and training to pt/pt family regarding anatomy/physiology of swallow function, risk factors of aspiration/aspiration pna & how to mitigate factors, diet modifications, and the use of compensatory swallow strategies to promote pt safety upon PO intake including chin tuck. In addition, ST provided instruction/training on oropharyngeal exercises (re: effortful swallow).     Repeat Study: Per MD or treating SLP       Mechanics of the Swallow Summary:  ORAL PHASE:  Lip Closure - Interlabial escape/no progression to anterior lip   Tongue Control During Bolus Hold - Posterior escape of less than half of the bolus   Bolus prep/mastication - Slow prolonged mastication with complete re-collection necessary   Bolus transport/lingual motion - Repetitive/disorganized tongue motion (tongue pumping)   Oral residue - Residue collection on oral structure     PHARYNGEAL PHASE:  Initiation of pharyngeal swallow - Bolus head at pit of pyriforms   Soft palate elevation - n/a  Laryngeal elevation - Minimal superior movement of thyroid cartilage with minimal approximation of arytenoids to epiglottic petiole   Anterior hyoid  excursion - No anterior movement   Epiglottic movement - Partial inversion  Laryngeal vestibule closure - Incomplete - narrow column of air/contrast in laryngeal vestibule   Pharyngeal stripping wave - Present, however, diminished   Pharyngeal contraction (A/P view) - Not tested       Pharyngoesophageal segment opening - Partial distension/partial duration with partial obstruction of flow of bolus   Tongue base retraction - Wide column of contrast or air between tongue base and pharyngeal wall   Pharyngeal residue - Majority of contrast within or on the pharyngeal structures     ESOPHAGEAL PHASE:  Esophageal clearance - Complete clearance       OUTCOME MEASURES:  Functional Oral Intake Scale  Functional Oral Intake Scale: Level 2        tube dependent with minimal attempts of food and liquid       Eating Assessment Tool (EAT-10)   0=No problem, 1=Mild problem, 2=Mild to moderate problem, 3=Moderate problem, 4=Severe problem  EAT 10  My swallowing problem has caused me to lose weight.: 4  My swallowing problem interferes with my ability to go out for meals.: 4  Swallowing liquids takes extra effort.: 4  Swallowing solids takes extra effort.: 4  Swallowing pills takes extra effort.: 4  Swallowing is painful: 0  The pleasure of eating is affected by my swallowing.: 4  When I swallow food sticks in my throat.: 4  I cough when I eat.: 3  Swallowing is stressful: 4  EAT-10 TOTAL SCORE:: 35  A total score of 3 or above may indicate difficulty with swallowing safely and/or efficiently      Rosenbek's Penetration Aspiration Scale  Thin Liquids: 8. SILENT ASPIRATION - contrast passes glottis, visible residue, NO pt response]   During the Swallow  After the Swallow  moderate amounts without chin tuck   Nectar Thick Liquids: 8. SILENT ASPIRATION - contrast passes glottis, visible residue, NO pt response]   After the Swallow min to trace amounts  Puree: 8. SILENT ASPIRATION - contrast passes glottis, visible residue, NO pt  response]   After the Swallow trace amounts  Solids: 5. DEEP PENETRATION with HIGH ASPIRATION risk - contrast contacts vocal cords, visible residue  During the Swallow  After the Swallow

## 2025-08-01 NOTE — PROGRESS NOTES
Mr. Guaman returns today for his first visit following the conclusion of his concurrent chemoradiation therapy.  On January 28, 2025 he underwent a a total soft palatectomy right pharyngectomy, bilateral neck dissections tracheotomy, and a radial forearm free flap reconstruction.  He did remarkably well following that procedure and was discharged from the hospital on postoperative day #7.  He then completed his concurrent chemoradiation therapy at the end of May and is here for his first postoperative visit.  He had a post treatment speech and swallowing evaluation in which he was able to take foods of all consistencies without difficulty.  He was still using his PEG tube for supplementation.  He follows up today with MBS results. Reports persistent dysphagia to solids and feels the food gets stuck in the back of his throat.  He denies any nasal regurgitation of liquids.  He is able to drink water but does feel it hangs up in the middle of his throat.  He denies any aspiration symptoms.    On physical examination his neck is well-healed without any adenopathy or masses.  Inspection of his oral cavity reveals a well-healed flap with a left-sided nasal port which closes appropriately and prevents nasal regurgitation.  There is no evidence of recurrent disease in the periphery of the flap margins.    Overall, Mr. Guaman is doing well following the completion of his concurrent chemoradiation therapy.  He will be receiving a posttreatment PET CT scan in September to confirm sufficient treatment of his primary malignancy.  MBS reviewed with findings noted to be moderate oropharyngeal dysphagia with trace-min amount of silent and overt aspiration with all consistencies.  Discussed recommendation is for dysphagia therapy with SLP to strengthen the swallow. Additionally, will plan for direct laryngoscopy, esophagoscopy with possible dilation. The risks, benefits and recovery were discussed and he wishes to proceed.    Delvin  DO Victor Hugo  Otolaryngology Resident (PGY-4)  I was present for the entire examination, including history and physical.

## 2025-08-04 ENCOUNTER — TELEPHONE (OUTPATIENT)
Dept: OTOLARYNGOLOGY | Facility: HOSPITAL | Age: 45
End: 2025-08-04
Payer: COMMERCIAL

## 2025-08-04 ENCOUNTER — SOCIAL WORK (OUTPATIENT)
Dept: CASE MANAGEMENT | Facility: HOSPITAL | Age: 45
End: 2025-08-04
Payer: COMMERCIAL

## 2025-08-04 NOTE — TELEPHONE ENCOUNTER
Casandra called to follow up from leaving Dr. Ferguson's apt on Friday.  They were to wait for me to see about scheduling a DL, esophagoscopy and dilation.    We arranged the procedure for 8/18/25 and she's aware that they will need to be at  by 5:45 a.m. that morning as surgery will be at 7:15 a.m.  He's having lab work done by his oncologist on 8/14/25.  I've asked that she have the results faxed to the office on 8/15/25 so we have the information for Monday's surgery.  Fax number provided.    She will reach out to DEANDRE Slaughter to see if she can help with transportation for the procedure.    She also stated they have a local SLP that will work with Nima.  Whit Norwood fax [phone].  A referral will be faxed over to her.

## 2025-08-04 NOTE — PROGRESS NOTES
Transportation Referral     Ambulation: Independently  Pick-up Address: [address] [place] [address]  Patient Phone: [phone]  Accompaniment: Yes,   Ambulation?: Independently  Phone Receives Text Messages? Yes  Transportation Service: [employer] ([phone])      Scheduled Ride(s):     Date:  8/18/2025  Appointment:  ENT procedure   [place] [facility] [place] [address] [place] [address]   Time:  02:40-03:10  Return Ride:  Will call   Confirmation: [id number]    SW informed Sim's wife, Casandra of details. SHASHI will continue to follow.    100.4

## 2025-08-05 DIAGNOSIS — R13.10 DYSPHAGIA, UNSPECIFIED TYPE: ICD-10-CM

## 2025-08-05 DIAGNOSIS — C10.9 MALIGNANT NEOPLASM OF OROPHARYNX: ICD-10-CM

## 2025-08-17 ENCOUNTER — ANESTHESIA EVENT (OUTPATIENT)
Dept: OPERATING ROOM | Facility: HOSPITAL | Age: 45
End: 2025-08-17
Payer: COMMERCIAL

## 2025-08-18 ENCOUNTER — ANESTHESIA (OUTPATIENT)
Dept: OPERATING ROOM | Facility: HOSPITAL | Age: 45
End: 2025-08-18
Payer: COMMERCIAL

## 2025-08-18 ENCOUNTER — HOSPITAL ENCOUNTER (OUTPATIENT)
Facility: HOSPITAL | Age: 45
Setting detail: OUTPATIENT SURGERY
Discharge: HOME | End: 2025-08-18
Attending: OTOLARYNGOLOGY | Admitting: OTOLARYNGOLOGY
Payer: COMMERCIAL

## 2025-08-18 VITALS
HEART RATE: 77 BPM | SYSTOLIC BLOOD PRESSURE: 133 MMHG | DIASTOLIC BLOOD PRESSURE: 83 MMHG | TEMPERATURE: 97 F | HEIGHT: 67 IN | RESPIRATION RATE: 14 BRPM | WEIGHT: 97.22 LBS | BODY MASS INDEX: 15.26 KG/M2 | OXYGEN SATURATION: 100 %

## 2025-08-18 DIAGNOSIS — C10.9 MALIGNANT NEOPLASM OF OROPHARYNX: ICD-10-CM

## 2025-08-18 DIAGNOSIS — C05.1 CANCER OF SOFT PALATE (MULTI): Primary | ICD-10-CM

## 2025-08-18 DIAGNOSIS — R13.10 DYSPHAGIA, UNSPECIFIED TYPE: ICD-10-CM

## 2025-08-18 PROBLEM — F41.9 ANXIETY: Status: ACTIVE | Noted: 2025-08-18

## 2025-08-18 PROCEDURE — 43762 RPLC GTUBE NO REVJ TRC: CPT | Performed by: OTOLARYNGOLOGY

## 2025-08-18 PROCEDURE — 7100000009 HC PHASE TWO TIME - INITIAL BASE CHARGE: Performed by: OTOLARYNGOLOGY

## 2025-08-18 PROCEDURE — 2720000007 HC OR 272 NO HCPCS: Performed by: OTOLARYNGOLOGY

## 2025-08-18 PROCEDURE — 2500000004 HC RX 250 GENERAL PHARMACY W/ HCPCS (ALT 636 FOR OP/ED): Mod: JZ,SE

## 2025-08-18 PROCEDURE — 7100000010 HC PHASE TWO TIME - EACH INCREMENTAL 1 MINUTE: Performed by: OTOLARYNGOLOGY

## 2025-08-18 PROCEDURE — A31525 PR LARYNGOSCOPY,DIRECT,DIAGNOSTIC

## 2025-08-18 PROCEDURE — 2500000001 HC RX 250 WO HCPCS SELF ADMINISTERED DRUGS (ALT 637 FOR MEDICARE OP): Mod: SE | Performed by: ANESTHESIOLOGY

## 2025-08-18 PROCEDURE — 3600000003 HC OR TIME - INITIAL BASE CHARGE - PROCEDURE LEVEL THREE: Performed by: OTOLARYNGOLOGY

## 2025-08-18 PROCEDURE — 3600000008 HC OR TIME - EACH INCREMENTAL 1 MINUTE - PROCEDURE LEVEL THREE: Performed by: OTOLARYNGOLOGY

## 2025-08-18 PROCEDURE — 3700000001 HC GENERAL ANESTHESIA TIME - INITIAL BASE CHARGE: Performed by: OTOLARYNGOLOGY

## 2025-08-18 PROCEDURE — 7100000002 HC RECOVERY ROOM TIME - EACH INCREMENTAL 1 MINUTE: Performed by: OTOLARYNGOLOGY

## 2025-08-18 PROCEDURE — 7100000001 HC RECOVERY ROOM TIME - INITIAL BASE CHARGE: Performed by: OTOLARYNGOLOGY

## 2025-08-18 PROCEDURE — 2500000004 HC RX 250 GENERAL PHARMACY W/ HCPCS (ALT 636 FOR OP/ED): Mod: JZ,SE | Performed by: ANESTHESIOLOGY

## 2025-08-18 PROCEDURE — 2500000005 HC RX 250 GENERAL PHARMACY W/O HCPCS: Mod: SE | Performed by: OTOLARYNGOLOGY

## 2025-08-18 PROCEDURE — 3700000002 HC GENERAL ANESTHESIA TIME - EACH INCREMENTAL 1 MINUTE: Performed by: OTOLARYNGOLOGY

## 2025-08-18 PROCEDURE — A31525 PR LARYNGOSCOPY,DIRECT,DIAGNOSTIC: Performed by: ANESTHESIOLOGY

## 2025-08-18 RX ORDER — FENTANYL CITRATE 50 UG/ML
INJECTION, SOLUTION INTRAMUSCULAR; INTRAVENOUS AS NEEDED
Status: DISCONTINUED | OUTPATIENT
Start: 2025-08-18 | End: 2025-08-18

## 2025-08-18 RX ORDER — LIDOCAINE HYDROCHLORIDE 10 MG/ML
0.1 INJECTION, SOLUTION INFILTRATION; PERINEURAL ONCE
Status: DISCONTINUED | OUTPATIENT
Start: 2025-08-18 | End: 2025-08-18 | Stop reason: HOSPADM

## 2025-08-18 RX ORDER — CEFAZOLIN 1 G/1
INJECTION, POWDER, FOR SOLUTION INTRAVENOUS AS NEEDED
Status: DISCONTINUED | OUTPATIENT
Start: 2025-08-18 | End: 2025-08-18

## 2025-08-18 RX ORDER — ONDANSETRON HYDROCHLORIDE 2 MG/ML
INJECTION, SOLUTION INTRAVENOUS AS NEEDED
Status: DISCONTINUED | OUTPATIENT
Start: 2025-08-18 | End: 2025-08-18

## 2025-08-18 RX ORDER — ONDANSETRON 4 MG/1
4 TABLET, ORALLY DISINTEGRATING ORAL EVERY 6 HOURS PRN
Qty: 20 TABLET | Refills: 0 | Status: SHIPPED | OUTPATIENT
Start: 2025-08-18 | End: 2025-08-23

## 2025-08-18 RX ORDER — PROPOFOL 10 MG/ML
INJECTION, EMULSION INTRAVENOUS AS NEEDED
Status: DISCONTINUED | OUTPATIENT
Start: 2025-08-18 | End: 2025-08-18

## 2025-08-18 RX ORDER — ONDANSETRON HYDROCHLORIDE 2 MG/ML
4 INJECTION, SOLUTION INTRAVENOUS ONCE AS NEEDED
Status: DISCONTINUED | OUTPATIENT
Start: 2025-08-18 | End: 2025-08-18 | Stop reason: HOSPADM

## 2025-08-18 RX ORDER — LIDOCAINE HYDROCHLORIDE 20 MG/ML
INJECTION, SOLUTION INFILTRATION; PERINEURAL AS NEEDED
Status: DISCONTINUED | OUTPATIENT
Start: 2025-08-18 | End: 2025-08-18

## 2025-08-18 RX ORDER — SODIUM CHLORIDE, SODIUM LACTATE, POTASSIUM CHLORIDE, CALCIUM CHLORIDE 600; 310; 30; 20 MG/100ML; MG/100ML; MG/100ML; MG/100ML
50 INJECTION, SOLUTION INTRAVENOUS CONTINUOUS
Status: DISCONTINUED | OUTPATIENT
Start: 2025-08-18 | End: 2025-08-18 | Stop reason: HOSPADM

## 2025-08-18 RX ORDER — ACETAMINOPHEN 325 MG/1
650 TABLET ORAL EVERY 4 HOURS PRN
Status: DISCONTINUED | OUTPATIENT
Start: 2025-08-18 | End: 2025-08-18 | Stop reason: HOSPADM

## 2025-08-18 RX ORDER — REMIFENTANIL HYDROCHLORIDE 1 MG/ML
INJECTION, POWDER, LYOPHILIZED, FOR SOLUTION INTRAVENOUS CONTINUOUS PRN
Status: DISCONTINUED | OUTPATIENT
Start: 2025-08-18 | End: 2025-08-18

## 2025-08-18 RX ORDER — WATER 1 ML/ML
INJECTION IRRIGATION AS NEEDED
Status: DISCONTINUED | OUTPATIENT
Start: 2025-08-18 | End: 2025-08-18 | Stop reason: HOSPADM

## 2025-08-18 RX ORDER — OXYCODONE HYDROCHLORIDE 5 MG/1
5 TABLET ORAL EVERY 4 HOURS PRN
Status: DISCONTINUED | OUTPATIENT
Start: 2025-08-18 | End: 2025-08-18 | Stop reason: HOSPADM

## 2025-08-18 RX ORDER — HYDROMORPHONE HYDROCHLORIDE 0.2 MG/ML
0.2 INJECTION INTRAMUSCULAR; INTRAVENOUS; SUBCUTANEOUS EVERY 5 MIN PRN
Status: DISCONTINUED | OUTPATIENT
Start: 2025-08-18 | End: 2025-08-18 | Stop reason: HOSPADM

## 2025-08-18 RX ORDER — AMOXICILLIN 125 MG/1
125 TABLET, CHEWABLE ORAL 2 TIMES DAILY
COMMUNITY

## 2025-08-18 RX ORDER — ESMOLOL HYDROCHLORIDE 10 MG/ML
INJECTION INTRAVENOUS AS NEEDED
Status: DISCONTINUED | OUTPATIENT
Start: 2025-08-18 | End: 2025-08-18

## 2025-08-18 RX ORDER — ROCURONIUM BROMIDE 10 MG/ML
INJECTION, SOLUTION INTRAVENOUS AS NEEDED
Status: DISCONTINUED | OUTPATIENT
Start: 2025-08-18 | End: 2025-08-18

## 2025-08-18 RX ADMIN — PROPOFOL 150 MG: 10 INJECTION, EMULSION INTRAVENOUS at 07:31

## 2025-08-18 RX ADMIN — FENTANYL CITRATE 25 MCG: 50 INJECTION, SOLUTION INTRAMUSCULAR; INTRAVENOUS at 08:15

## 2025-08-18 RX ADMIN — LIDOCAINE HYDROCHLORIDE 3 ML: 20 INJECTION, SOLUTION INFILTRATION; PERINEURAL at 07:31

## 2025-08-18 RX ADMIN — SODIUM CHLORIDE, SODIUM LACTATE, POTASSIUM CHLORIDE, AND CALCIUM CHLORIDE: 600; 310; 30; 20 INJECTION, SOLUTION INTRAVENOUS at 07:31

## 2025-08-18 RX ADMIN — FENTANYL CITRATE 25 MCG: 50 INJECTION, SOLUTION INTRAMUSCULAR; INTRAVENOUS at 08:24

## 2025-08-18 RX ADMIN — ESMOLOL HYDROCHLORIDE 100 MG: 10 INJECTION, SOLUTION INTRAVENOUS at 07:33

## 2025-08-18 RX ADMIN — REMIFENTANIL HYDROCHLORIDE 10 MCG: 1 INJECTION, POWDER, LYOPHILIZED, FOR SOLUTION INTRAVENOUS at 07:45

## 2025-08-18 RX ADMIN — OXYCODONE HYDROCHLORIDE 5 MG: 5 TABLET ORAL at 09:07

## 2025-08-18 RX ADMIN — DEXAMETHASONE SODIUM PHOSPHATE 10 MG: 4 INJECTION INTRA-ARTICULAR; INTRALESIONAL; INTRAMUSCULAR; INTRAVENOUS; SOFT TISSUE at 07:43

## 2025-08-18 RX ADMIN — CEFAZOLIN 2 G: 1 INJECTION, POWDER, FOR SOLUTION INTRAMUSCULAR; INTRAVENOUS at 07:32

## 2025-08-18 RX ADMIN — ONDANSETRON 4 MG: 2 INJECTION INTRAMUSCULAR; INTRAVENOUS at 07:46

## 2025-08-18 RX ADMIN — ESMOLOL HYDROCHLORIDE 50 MG: 10 INJECTION, SOLUTION INTRAVENOUS at 07:40

## 2025-08-18 RX ADMIN — HYDROMORPHONE HYDROCHLORIDE 0.5 MG: 1 INJECTION, SOLUTION INTRAMUSCULAR; INTRAVENOUS; SUBCUTANEOUS at 08:49

## 2025-08-18 RX ADMIN — FENTANYL CITRATE 25 MCG: 50 INJECTION, SOLUTION INTRAMUSCULAR; INTRAVENOUS at 07:24

## 2025-08-18 RX ADMIN — ROCURONIUM BROMIDE 50 MG: 10 INJECTION, SOLUTION INTRAVENOUS at 07:33

## 2025-08-18 ASSESSMENT — PAIN DESCRIPTION - ORIENTATION
ORIENTATION: LOWER
ORIENTATION: LOWER

## 2025-08-18 ASSESSMENT — PAIN SCALES - GENERAL
PAINLEVEL_OUTOF10: 10 - WORST POSSIBLE PAIN
PAIN_LEVEL: 10
PAINLEVEL_OUTOF10: 5 - MODERATE PAIN
PAINLEVEL_OUTOF10: 5 - MODERATE PAIN
PAINLEVEL_OUTOF10: 0 - NO PAIN
PAINLEVEL_OUTOF10: 5 - MODERATE PAIN
PAINLEVEL_OUTOF10: 10 - WORST POSSIBLE PAIN

## 2025-08-18 ASSESSMENT — PAIN - FUNCTIONAL ASSESSMENT
PAIN_FUNCTIONAL_ASSESSMENT: 0-10

## 2025-08-18 ASSESSMENT — PAIN DESCRIPTION - LOCATION
LOCATION: BACK
LOCATION: BACK

## (undated) DEVICE — COVER, CART, 45 X 27 X 48 IN, CLEAR

## (undated) DEVICE — TUBE, TRACH, CUFFED, LOW PRESS, LPC, SZ-6, 6.4MM ID, LF

## (undated) DEVICE — Device

## (undated) DEVICE — WOUND VAC KIT, W/CANISTER, 500ML (5/CS)

## (undated) DEVICE — SPONGE, DISSECTING, PEANUT, 3/8 IN, XRAY DETECTABLE

## (undated) DEVICE — MARKER, SURGICAL, SKIN, STANDARD, W/RULER, LF

## (undated) DEVICE — CUP, SOLUTION

## (undated) DEVICE — SHEARS, CURVED 9CM HARMONIC FOCUS

## (undated) DEVICE — TUBING, SUCTION, CONNECTING, STERILE 0.25 X 120 IN., LF

## (undated) DEVICE — REST, HEAD, BAGEL, 9 IN

## (undated) DEVICE — BLADE, RECIPROCATING TAPERED

## (undated) DEVICE — DRESSING KIT, VACUUM ASSISTED CLOSURE, W/DRAPE/TUBING, LARGE, FOAM, BLACK

## (undated) DEVICE — CATHETER, IV, ANGIOCATH, 24 G X 0.75 IN, FEP POLYMER

## (undated) DEVICE — SUTURE, SILK, 2-0, 30 IN, SH, BLACK

## (undated) DEVICE — CAUTERY, PENCIL, PUSH BUTTON, SMOKE EVAC, 70MM

## (undated) DEVICE — BASIN, EMESIS, STANDARD, STERILE

## (undated) DEVICE — MANIFOLD, 4 PORT NEPTUNE STANDARD

## (undated) DEVICE — CLIP, LIGATING, W/ADHESIVE, WIDE SLOT, SMALL, TITANIUM

## (undated) DEVICE — TIP, SUCTION, YANKAUER, FLEXIBLE

## (undated) DEVICE — DRESSING, NON-ADHERENT, TELFA, OUCHLESS, 3 X 8 IN, STERILE

## (undated) DEVICE — DRESSING, TRANSPARENT, TEGADERM, 8 X 12 IN

## (undated) DEVICE — DRESSING, GAUZE, 16 PLY, 4 X 4 IN, STERILE

## (undated) DEVICE — HOLSTER, JET SAFETY

## (undated) DEVICE — DRILL, KLSM 1.5 X 105

## (undated) DEVICE — MICROCLIPS, GEM HEMOSTATIC TITANIUM

## (undated) DEVICE — SPONGE GAUZE, XRAY SC+RFID, 4X4 16 PLY, STERILE

## (undated) DEVICE — CONTAINER, SPECIMEN, 120 ML, STERILE

## (undated) DEVICE — DRESSING, GAUZE, PETROLATUM, XEROFORM, 5 X 9 IN, STERILE

## (undated) DEVICE — IMPLANTABLE DEVICE: Type: IMPLANTABLE DEVICE | Status: NON-FUNCTIONAL

## (undated) DEVICE — DRAIN, WOUND, FLAT, HUBLESS, FULL LENGTH PERFORATION, 10 MM X 20 CM, SILICONE

## (undated) DEVICE — CUP, MEDICINE, PLASTIC, 8 OZ

## (undated) DEVICE — VALVE, SUCTION

## (undated) DEVICE — BLADE, DERMATOME, 1.25 X 4.25 IN, STERILE

## (undated) DEVICE — BACKGROUND MATERIAL, MERCIAN, YELLOW, 1MM GRID, LF

## (undated) DEVICE — PADDING, WEBRIL, UNDERCAST, STERILE, 4 IN

## (undated) DEVICE — VALVE, BIOPSY, BRONCHOSCOPE, DISPOSABLE, STERILE

## (undated) DEVICE — SYRINGE, 60 CC, IRRIGATION, BULB, CONTRO-BULB, PAPER POUCH

## (undated) DEVICE — NEEDLE, ELECTRODE, ELECTROSURGICAL, INSULATED

## (undated) DEVICE — ADAPTER, WATER BOTTLE, SMART CAP, 140/160/180 SERIES

## (undated) DEVICE — SPEAR, EYE, SURGICAL, WECK-CEL, CELLULOSE

## (undated) DEVICE — BOWL, BASIN, 32 OZ, STERILE

## (undated) DEVICE — NEEDLE, HYPODERMIC, 25 G X 1.5 IN, A BEVEL, STERILE

## (undated) DEVICE — SYRINGE, 10 CC, LUER LOCK

## (undated) DEVICE — BURR, OVAL MEDIUM, 4.0MM

## (undated) DEVICE — CATHETER, IV, INSYTE, AUTOGUARD, SHIELDED, 16 G X 1.16 IN, VIALON

## (undated) DEVICE — SYRINGE, 5 CC, LUER LOCK

## (undated) DEVICE — DRESSING, MEPILEX, BORDER, HEEL, 8.7 X 9.1 IN

## (undated) DEVICE — SUTURE, VICRYL, 3-0,18 IN, SH, UNDYED

## (undated) DEVICE — SUTURE, PLAIN, 5-0, 18 IN, PC1, YELLOW

## (undated) DEVICE — GEL, ECOVUE, ULTRASOUND, 20GRAM, STERILE

## (undated) DEVICE — GOWN, SURGICAL, SMARTGOWN, XLARGE, STERILE

## (undated) DEVICE — COVER, MAYO STAND, W/PAD, 23 IN, DISPOSABLE, PLASTIC, LF, STERILE

## (undated) DEVICE — PAD, GROUNDING, ELECTROSURGICAL, W/9 FT CABLE, POLYHESIVE II, ADULT, LF

## (undated) DEVICE — SPONGE, LAP, XRAY DECT, SC+RFID, 12X12, STERILE

## (undated) DEVICE — BLADE, OSCILLATING/SAGITTAL, 25MM X 9MM

## (undated) DEVICE — BACKGROUND MATERIAL MICROMAT

## (undated) DEVICE — ELECTRODE, ELECTROSURGICAL, COAGULATOR, W/SUCTION, HANDSWITCHING, 8 FR, 6 IN

## (undated) DEVICE — DRESSING, MEPILEX, BORDER, SACRUM, 8.7 X 9.8 IN

## (undated) DEVICE — COVER, TABLE, 44 X 75 IN, DISPOSABLE, LF, STERILE

## (undated) DEVICE — DRAPE, SHEET, FAN FOLDED, HALF, 44 X 58 IN, DISPOSABLE, LF, STERILE

## (undated) DEVICE — ELECTRODE, ELECTROSURGICAL, BLADE, INSULATED, ENT/IMA, STERILE

## (undated) DEVICE — CLIP, LIGATING, W/ADHESIVE PAD, MEDIUM, TITANIUM

## (undated) DEVICE — CLEANER, WIPE, INSTRUMENT, 3.25 X 3.25 IN

## (undated) DEVICE — CATHETER TRAY, SURESTEP, 16FR, URINE METER W/STATLOCK

## (undated) DEVICE — TRAY, SURESTEP, URINE METER, 14FR, SILICONE

## (undated) DEVICE — COUNTER, NEEDLE, FOAM BLOCK, W/MAGNET, W/BLADE GUARD, 10 COUNT, RED, LF

## (undated) DEVICE — SPONGE, HEMOSTATIC, GELATIN, SURGIFOAM, 8 X 12.5 CM X 10 MM

## (undated) DEVICE — VALVE, DEFENDO, 5 PCS BUTTON SET, SINGLE USE

## (undated) DEVICE — DRAPE, MICROSCOPE, W/REMOVABLE LENS

## (undated) DEVICE — CATHETER, IV, INSYTE, AUTOGUARD BC PNK, 20 GA X 1.16 IN

## (undated) DEVICE — DRESSING, ABDOMINAL, TENDERSORB, 8 X 10 IN, STERILE

## (undated) DEVICE — CATHETER, URETHRAL, PEZZER, 3 CM HEAD, 36 FR, LATEX

## (undated) DEVICE — SPONGE, DISSECTOR, PEANUT, 3/8, STERILE 5 FOAM HOLDER"

## (undated) DEVICE — CANNULA, ANTERIOR CHAMBER